# Patient Record
Sex: MALE | Race: WHITE | NOT HISPANIC OR LATINO | Employment: OTHER | URBAN - METROPOLITAN AREA
[De-identification: names, ages, dates, MRNs, and addresses within clinical notes are randomized per-mention and may not be internally consistent; named-entity substitution may affect disease eponyms.]

---

## 2017-03-09 ENCOUNTER — HOSPITAL ENCOUNTER (OUTPATIENT)
Dept: NEUROLOGY | Facility: CLINIC | Age: 32
Setting detail: THERAPIES SERIES
Discharge: STILL ADMITTED OR ADMINISTRATIVE | End: 2017-03-09
Attending: NURSE PRACTITIONER

## 2017-03-09 DIAGNOSIS — S06.9XAA TBI (TRAUMATIC BRAIN INJURY) (H): ICD-10-CM

## 2017-03-09 DIAGNOSIS — F90.9 ADHD (ATTENTION DEFICIT HYPERACTIVITY DISORDER): ICD-10-CM

## 2017-03-09 DIAGNOSIS — F06.30 MOOD DISORDER AS LATE EFFECT OF TRAUMATIC BRAIN INJURY (H): ICD-10-CM

## 2017-03-09 DIAGNOSIS — S06.9XAS MOOD DISORDER AS LATE EFFECT OF TRAUMATIC BRAIN INJURY (H): ICD-10-CM

## 2017-09-12 ENCOUNTER — HOSPITAL ENCOUNTER (OUTPATIENT)
Dept: NEUROLOGY | Facility: CLINIC | Age: 32
Setting detail: THERAPIES SERIES
Discharge: STILL ADMITTED OR ADMINISTRATIVE | End: 2017-09-12
Attending: NURSE PRACTITIONER

## 2017-09-12 DIAGNOSIS — F06.30 MOOD DISORDER AS LATE EFFECT OF TRAUMATIC BRAIN INJURY (H): ICD-10-CM

## 2017-09-12 DIAGNOSIS — S06.9XAS MOOD DISORDER AS LATE EFFECT OF TRAUMATIC BRAIN INJURY (H): ICD-10-CM

## 2017-09-12 DIAGNOSIS — S06.9XAA TBI (TRAUMATIC BRAIN INJURY) (H): ICD-10-CM

## 2017-09-12 DIAGNOSIS — F90.9 ADHD (ATTENTION DEFICIT HYPERACTIVITY DISORDER): ICD-10-CM

## 2018-01-31 ENCOUNTER — APPOINTMENT (OUTPATIENT)
Dept: GENERAL RADIOLOGY | Facility: CLINIC | Age: 33
End: 2018-01-31
Attending: EMERGENCY MEDICINE
Payer: MEDICARE

## 2018-01-31 ENCOUNTER — HOSPITAL ENCOUNTER (EMERGENCY)
Facility: CLINIC | Age: 33
Discharge: HOME | End: 2018-01-31
Attending: EMERGENCY MEDICINE | Admitting: EMERGENCY MEDICINE
Payer: MEDICARE

## 2018-01-31 VITALS
HEIGHT: 67 IN | BODY MASS INDEX: 23.54 KG/M2 | TEMPERATURE: 98.3 F | SYSTOLIC BLOOD PRESSURE: 115 MMHG | DIASTOLIC BLOOD PRESSURE: 81 MMHG | RESPIRATION RATE: 16 BRPM | WEIGHT: 150 LBS | HEART RATE: 89 BPM | OXYGEN SATURATION: 91 %

## 2018-01-31 DIAGNOSIS — T17.308A CHOKING, INITIAL ENCOUNTER: ICD-10-CM

## 2018-01-31 PROCEDURE — 71046 X-RAY EXAM CHEST 2 VIEWS: CPT

## 2018-01-31 PROCEDURE — 99283 EMERGENCY DEPT VISIT LOW MDM: CPT | Mod: 25

## 2018-01-31 ASSESSMENT — ENCOUNTER SYMPTOMS
FEVER: 0
SHORTNESS OF BREATH: 0
CHOKING: 1
COUGH: 0

## 2018-01-31 NOTE — ED NOTES
Bed: ED07  Expected date:   Expected time:   Means of arrival:   Comments:  ginny - 541 - 32M winnie eta [id number]

## 2018-01-31 NOTE — ED AVS SNAPSHOT
Emergency Department    [address] [place] [address]    Phone:  [phone]    Fax:  [phone]                                       Robin Blum   MRN: [id number]    Department:   Emergency Department   Date of Visit:  1/31/2018           Patient Information     Date Of Birth          1985        Your diagnoses for this visit were:     Choking, initial encounter        You were seen by Jeanine Mantilla MD.      Follow-up Information     Please follow up.    Why:  Follow up with your MD in 2 days. Return if difficulty breathing, fever >101, productive cough      Discharge References/Attachments     CHOKING SPELL (ADULT) (ENGLISH)      24 Hour Appointment Hotline       To make an appointment at any [facility], call [phone] ([phone]). If you don't have a family doctor or clinic, we will help you find one. [facility] clinics are conveniently located to serve the needs of you and your family.             Review of your medicines      Our records show that you are taking the medicines listed below. If these are incorrect, please call your family doctor or clinic.        Dose / Directions Last dose taken    acetaminophen 500 MG Caps   Dose:  1 tablet   Quantity:  100 capsule        Take 1 tablet by mouth every 4 hours as needed.   Refills:  0        alum & mag hydroxide-simethicone 400-400-40 MG/5ML Susp suspension   Commonly known as:  MYLANTA ES/MAALOX  ES   Dose:  10-20 mL        Take 10-20 mLs by mouth every 4 hours as needed for indigestion   Refills:  0        ascorbic acid 250 MG Chew chewable tablet   Commonly known as:  vitamin C   Dose:  250 mg   Quantity:  30 tablet        Take 1 tablet by mouth daily.   Refills:  0        calcium polycarbophil 625 MG tablet   Commonly known as:  FIBERCON   Dose:  1 tablet        Take 1 tablet by mouth daily (with lunch)   Refills:  0        cefuroxime 500 MG tablet   Commonly known as:  CEFTIN   Dose:  500 mg    Quantity:  20 tablet        Take 1 tablet (500 mg) by mouth 2 times daily   Refills:  0        clotrimazole 1 % cream   Commonly known as:  LOTRIMIN        Apply topically as needed   Refills:  0        DAILY EARL Tabs   Dose:  1 tablet   Quantity:  30 tablet        Take 1 tablet by mouth daily.   Refills:  5        DOCUSATE SODIUM PO   Dose:  200 mg        Take 200 mg by mouth At Bedtime   Refills:  0        FIBER PO   Dose:  1 tablet        Take 1 tablet by mouth daily   Refills:  0        FLEET ENEMA RE        Place rectally as needed   Refills:  0        FLEET LIQUID GLYCERIN SUPP RE   Dose:  2 g        Place 2 g rectally as needed   Refills:  0        fluticasone 27.5 MCG/SPRAY spray   Commonly known as:  VERAMYST   Dose:  2 spray        Spray 2 sprays into both nostrils daily   Refills:  0        hydrocortisone 1 % cream   Commonly known as:  CORTAID        Apply topically as needed   Refills:  0        IBUPROFEN PO   Dose:  200-400 mg        Take 200-400 mg by mouth every 4 hours as needed for moderate pain   Refills:  0        KLONOPIN PO   Dose:  1 mg   Indication:  Muscular Spasm or Twitch occurring with Seizures        Take 1 mg by mouth 4 times daily 08/12/16/22   Refills:  0        lamoTRIgine 100 MG Tbdp ODT tab   Commonly known as:  LAMICTAL ODT   Dose:  100 mg   Quantity:  30 tablet        Take 1 tablet (100 mg) by mouth daily   Refills:  0        loperamide 2 MG tablet   Commonly known as:  IMODIUM A-D   Dose:  2 mg        Take 2 mg by mouth as needed for diarrhea   Refills:  0        LORazepam 2 MG/ML (HIGH CONC) solution   Commonly known as:  ATIVAN   Dose:  2 mg   Indication:  Status Epilepticus        Take 2 mg by mouth daily as needed for anxiety   Refills:  0        LOTRIMIN AF 2 % Aero   Generic drug:  Miconazole Nitrate        Externally apply topically as needed   Refills:  0        MILK OF MAGNESIA PO        Take by mouth as needed   Refills:  0        omeprazole 20 MG tablet   Dose:  20  mg   Quantity:  30 tablet        Take 20 mg by mouth daily.   Refills:  0        polyethylene glycol Packet   Commonly known as:  MIRALAX/GLYCOLAX   Dose:  1.5 packet        Take 1.5 packets by mouth daily   Refills:  0        PRISTIQ PO   Dose:  100 mg        Take 100 mg by mouth daily (with lunch) 1200 noon   Refills:  0        * QUEtiapine 25 MG tablet   Commonly known as:  SEROquel   Dose:  25 mg        Take 25 mg by mouth 3 times daily as needed   Refills:  0        * QUEtiapine 50 MG tablet   Commonly known as:  SEROquel   Dose:  50 mg        Take 50 mg by mouth At Bedtime   Refills:  0        * QUEtiapine 50 MG tablet   Commonly known as:  SEROQUEL   Dose:  50 mg   Quantity:  30 tablet        Take 1 tablet by mouth. 1 tablet po daily   Refills:  0        ROBITUSSIN MUCUS+CHEST CONGEST 100 MG/5ML Liqd   Dose:  10 mL   Generic drug:  guaiFENesin        Take 10 mLs by mouth every 4 hours as needed for cough   Refills:  0        SELSUN BLUE SALON 1 % Sham   Generic drug:  pyrithione zinc        Apply topically daily as needed for irritation   Refills:  0        senna-docusate 8.6-50 MG per tablet   Commonly known as:  SENNA PLUS   Dose:  1 tablet   Quantity:  60 tablet        Take 1 tablet by mouth 2 times daily.   Refills:  0        sodium chloride 0.65 % nasal spray   Commonly known as:  OCEAN   Dose:  1 spray        Spray 1 spray into both nostrils as needed for congestion   Refills:  0        SPORT SUNSCREEN SPF15 EX        Externally apply topically as needed   Refills:  0        SUDAFED PE PRESSURE+PAIN+COLD PO        Take by mouth as needed   Refills:  0        tiZANidine 2 MG tablet   Commonly known as:  ZANAFLEX   Dose:  4 mg   Quantity:  120 tablet        Take 2 tablets by mouth. Twice daily   Refills:  0        VITAMIN D3 PO   Dose:  400 Units        Take 400 Units by mouth daily (with lunch)   Refills:  0        * Notice:  This list has 3 medication(s) that are the same as other medications prescribed  for you. Read the directions carefully, and ask your doctor or other care provider to review them with you.            Procedures and tests performed during your visit     XR Chest 2 Views      Orders Needing Specimen Collection     None      Pending Results     No orders found from 1/29/2018 to 2/1/2018.            Pending Culture Results     No orders found from 1/29/2018 to 2/1/2018.            Pending Results Instructions     If you had any lab results that were not finalized at the time of your Discharge, you can call the ED Lab Result RN at [phone]. You will be contacted by this team for any positive Lab results or changes in treatment. The nurses are available 7 days a week from 10A to 6:30P.  You can leave a message 24 hours per day and they will return your call.        Test Results From Your Hospital Stay        1/31/2018  4:41 PM      Narrative     CHEST TWO VIEWS   1/31/2018 4:33 PM     HISTORY: Choking. Possible aspiration.    COMPARISON: None.    FINDINGS: Hypoinflated lungs. No convincing pulmonary opacities.  Normal size cardiac silhouette. A ventriculoperitoneal shunt catheter  projects over the right hemithorax.        Impression     IMPRESSION: No convincing evidence of active cardiopulmonary disease.    DWIGHT COY MD                Clinical Quality Measure: Blood Pressure Screening     Your blood pressure was checked while you were in the emergency department today. The last reading we obtained was  BP: 108/74 . Please read the guidelines below about what these numbers mean and what you should do about them.  If your systolic blood pressure (the top number) is less than 120 and your diastolic blood pressure (the bottom number) is less than 80, then your blood pressure is normal. There is nothing more that you need to do about it.  If your systolic blood pressure (the top number) is 120-139 or your diastolic blood pressure (the bottom number) is 80-89, your blood pressure may be higher  "than it should be. You should have your blood pressure rechecked within a year by a primary care provider.  If your systolic blood pressure (the top number) is 140 or greater or your diastolic blood pressure (the bottom number) is 90 or greater, you may have high blood pressure. High blood pressure is treatable, but if left untreated over time it can put you at risk for heart attack, stroke, or kidney failure. You should have your blood pressure rechecked by a primary care provider within the next 4 weeks.  If your provider in the emergency department today gave you specific instructions to follow-up with your doctor or provider even sooner than that, you should follow that instruction and not wait for up to 4 weeks for your follow-up visit.        Thank you for choosing [facility]       Thank you for choosing [facility] for your care. Our goal is always to provide you with excellent care. Hearing back from our patients is one way we can continue to improve our services. Please take a few minutes to complete the written survey that you may receive in the mail after you visit with us. Thank you!        [employer] Information     [employer] lets you send messages to your doctor, view your test results, renew your prescriptions, schedule appointments and more. To sign up, go to [email or web address] . Click on \"Log in\" on the left side of the screen, which will take you to the Welcome page. Then click on \"Sign up Now\" on the right side of the page.     You will be asked to enter the access code listed below, as well as some personal information. Please follow the directions to create your username and password.     Your access code is: [email or web address]  Expires: 2018  6:44 PM     Your access code will  in 90 days. If you need help or a new code, please call your [facility] clinic or [phone].        Care EveryWhere ID     This is your Care EveryWhere ID. This could be used by other organizations to access your [facility] " medical records  [id number]        Equal Access to Services     CARLOS BERNAL : Thaddeus Prince, archana collins, david encinas. So [facility] [phone].    ATENCIÓN: Si habla español, tiene a hairston disposición servicios gratuitos de asistencia lingüística. Llame al [phone].    We comply with applicable federal civil rights laws and [place] laws. We do not discriminate on the basis of race, color, national origin, age, disability, sex, sexual orientation, or gender identity.            After Visit Summary       This is your record. Keep this with you and show to your community pharmacist(s) and doctor(s) at your next visit.

## 2018-01-31 NOTE — ED AVS SNAPSHOT
Emergency Department    [address] [place] [address]    Phone:  [phone]    Fax:  [phone]                                       Robin Blum   MRN: [id number]    Department:   Emergency Department   Date of Visit:  1/31/2018           After Visit Summary Signature Page     I have received my discharge instructions, and my questions have been answered. I have discussed any challenges I see with this plan with the nurse or doctor.    ..........................................................................................................................................  Patient/Patient Representative Signature      ..........................................................................................................................................  Patient Representative Print Name and Relationship to Patient    ..................................................               ................................................  Date                                            Time    ..........................................................................................................................................  Reviewed by Signature/Title    ...................................................              ..............................................  Date                                                            Time

## 2018-01-31 NOTE — ED PROVIDER NOTES
History     Chief Complaint:  Choking    HPI   History taken by family and staff from group [place] due to the patient's nonverbal status.  Robin Blum is a 32 year old male with a history of paraplegia, TBI, non-verbal and choking on a specific protocol, who presents with concerns for choking. The patient had a TBI about 10 years ago and has since had diet restrictions for choking concerns. He has a history of frequent choking episodes with an episode last year at the [facility] when the patient choked on chicken and EMS was contacted. He currently lives in a group home and today, while eating a burger and fries, suffered another choking episode and was unconscious for 5 minutes. Heimlich was performed by a  and food was successfully dislodged and chest compressions followed. He vomited and regained concsiouness. He has not been coughing or having any increased secretions. The patient's parents are at bedside and believe that his choking is a result of high turnover at his care facility and they are concerned that the staff are not up to date with his ongoing plan. They also note that the patient passed a swallowing study and his choking happens when he eats fast. He denies fevers or recent illness. His primary care provider is Dr. Oneill at [facility] in [place]. He has a  shunt in place.    Allergies:  Aloe  Depakote [Valproic Acid]  Vicodin [Hydrocodone-Acetaminophen]    Medications:    fluticasone (VERAMYST) 27.5 MCG/SPRAY spray  QUEtiapine (SEROQUEL) 25 MG tablet  QUEtiapine (SEROQUEL) 50 MG tablet  sodium chloride (OCEAN) 0.65 % nasal spray  lamoTRIgine (LAMICTAL ODT) 100 MG TBDP  ClonazePAM (KLONOPIN PO)  DOCUSATE SODIUM PO  Cholecalciferol (VITAMIN D3 PO)  LORazepam (ATIVAN) 2 MG/ML concentrated solution  Multiple Vitamin (DAILY EARL) TABS  Omeprazole 20 MG tablet  TIZANidine (ZANAFLEX) 2 MG tablet  hydrocortisone (CORTAID) 1 % cream  loperamide (IMODIUM A-D) 2 MG  "tablet  cefUROXime (CEFTIN) 500 MG tablet  clotrimazole (LOTRIMIN) 1 % cream  FIBER PO  Sodium Phosphates (FLEET ENEMA RE)  Glycerin, Laxative, (FLEET LIQUID GLYCERIN SUPP RE)  Miconazole Nitrate (LOTRIMIN AF) 2 % AERO  Magnesium Hydroxide (MILK OF MAGNESIA PO)  guaiFENesin (ROBITUSSIN MUCUS+CHEST CONGEST) 100 MG/5ML LIQD  pyrithione zinc (SELSUN BLUE SALON) 1 % SHAM  Phenylephrine-DM-GG-APAP (SUDAFED PE PRESSURE+PAIN+COLD PO)  SPORT SUNSCREEN SPF15 EX  Desvenlafaxine Succinate (PRISTIQ PO)  calcium polycarbophil (FIBERCON) 625 MG tablet  polyethylene glycol (MIRALAX/GLYCOLAX) packet  IBUPROFEN PO  alum & mag hydroxide-simethicone (MYLANTA ES/MAALOX  ES) 400-400-40 MG/5ML SUSP  senna-docusate (SENNA PLUS) 8.6-50 MG per tablet  ascorbic acid (VITAMIN C) 250 MG CHEW  Acetaminophen 500 MG CAPS    Past Medical History:    Aphasia  Attention deficit disorder with hyperactivity  Depressive disorder   Esophageal reflux  Myoclonus  OCD  Other mixed or unspecified nondependent drug abuse in remission  Paraplegia  Personal history of TBI  Unspecified constipation    Past Surgical History:    History reviewed. No pertinent past surgical history.    Family History:    CAD  Hyperlipidemia  Depression/Anxiety    Social History:  The patient was accompanied to the ED by his parents and a group home staff member.  Smoking Status: never  Smokeless Tobacco: never  Alcohol Use: no    Marital Status:  Single [1]    Review of Systems   Constitutional: Negative for fever.   Respiratory: Positive for choking. Negative for cough and shortness of breath.    All other systems reviewed and are negative.      Physical Exam   First Vitals:  BP: 114/76  Pulse: 110  Temp: 98.3  F (36.8  C)  Resp: 20  Height: 170.2 cm (5' 7\")  Weight: 68 kg (150 lb)  SpO2: 91 %  Patient Vitals for the past 24 hrs:   BP Temp Temp src Pulse Resp SpO2 Height Weight   01/31/18 1830 115/81 - - 89 16 91 % - -   01/31/18 1800 114/82 - - 85 18 92 % - -   01/31/18 1730 " "108/74 - - 81 18 96 % - -   01/31/18 1700 109/77 - - 85 18 97 % - -   01/31/18 1600 107/78 - - 95 18 95 % - -   01/31/18 1530 109/76 - - 100 20 93 % - -   01/31/18 1528 107/79 - - 102 18 91 % - -   01/31/18 1519 114/76 98.3  F (36.8  C) Oral 110 20 91 % 1.702 m (5' 7\") 68 kg (150 lb)         Physical Exam  Physical Exam   Constitutional:  Non verbal, very pleasant male, no acute distress.   HENT:   Mouth/Throat:   Oropharynx is clear and moist.   Eyes:    Conjunctivae normal and EOM are normal. Pupils are equal, round, and reactive to light.   Neck:    Normal range of motion.   Cardiovascular: Normal rate, regular rhythm and normal heart sounds.  Exam reveals no gallop and no friction rub.  No murmur heard.  Pulmonary/Chest:  Effort normal and breath sounds are equal bilaterally. Patient has no wheezes. Patient has no rales or rhonchi.   Abdominal:   Soft. Bowel sounds are normal. Patient exhibits no mass. There is no tenderness. There is no rebound and no guarding.   Musculoskeletal:  Normal range of motion. Patient exhibits no edema.   Neurological:   Alert, nonverbal, no cranial nerve deficits, no acute motor sensory deficits.  Skin:   Skin is warm and dry. No rash noted. No erythema.   Psychiatric:   Patient has a pleasant mood.      Emergency Department Course   Imaging:  Radiographic findings were communicated with the patient who voiced understanding of the findings.  XR Chest 2 views:   No convincing evidence of active cardiopulmonary disease. As per radiology.     Emergency Department Course:  Nursing notes and vitals reviewed. I performed an exam of the patient as documented above.     The patient was sent for a chest X-ray while in the emergency department, findings above.     1810 I rechecked the patient and discussed the results of his workup thus far.     Findings and plan explained to the Patient and family at bedside. Patient discharged home with instructions regarding supportive care, medications, " and reasons to return. The importance of close follow-up was reviewed.     I personally reviewed the laboratory results with the Patient and family and answered all related questions prior to discharge.     Impression & Plan    Medical Decision Making:  Robin Blum is a 31 y/o gentlemen who was brought in for evaluation after a witnessed choking episode at his group home. Staff did heimlich and was able to dislodge the piece of hamburger that was in his throat. They transported him here for evaluation. When I assessed him he was in no respiratory distress and was at his baseline. He had no sore throat or cough. X-ray was performed to evaluate for aspiration pneumonia, fortunately this looks normal. He was observed here in the ED for some time on the pulse oximeter and his stats and vital signs remained normal. At this time, while he had an acute choking episode, with it sounds like a period of respiratory arrest before heimlich was performed, he is breathing normally now and I feel he is safe to be discharged back to his care facility. I explained to his family that I would like him reassess with the house physician in the next 1-2 days as pneumonia can sometimes develop a day or 2 after the actual choking episode. They should also abide by the dietary restrictions that he is on.     Diagnosis:    ICD-10-CM    1. Choking, initial encounter T17.308A        Disposition:  discharged to home    I, Hudson Stubbs, am serving as a scribe on 1/31/2018 at 4:01 PM to personally document services performed by Jeanine [facility] MD based on my observations and the provider's statements to me.       Hudson Stubbs  1/31/2018    EMERGENCY DEPARTMENT       Jeanine [facility] MD  02/01/18 0049

## 2018-03-13 ENCOUNTER — HOSPITAL ENCOUNTER (OUTPATIENT)
Dept: NEUROLOGY | Facility: CLINIC | Age: 33
Setting detail: THERAPIES SERIES
Discharge: STILL ADMITTED OR ADMINISTRATIVE | End: 2018-03-13
Attending: NURSE PRACTITIONER

## 2018-03-13 DIAGNOSIS — F90.9 ADHD (ATTENTION DEFICIT HYPERACTIVITY DISORDER): ICD-10-CM

## 2018-03-13 DIAGNOSIS — F06.30 MOOD DISORDER AS LATE EFFECT OF TRAUMATIC BRAIN INJURY (H): ICD-10-CM

## 2018-03-13 DIAGNOSIS — S06.9XAS MOOD DISORDER AS LATE EFFECT OF TRAUMATIC BRAIN INJURY (H): ICD-10-CM

## 2018-03-19 ENCOUNTER — COMMUNICATION - HEALTHEAST (OUTPATIENT)
Dept: NEUROLOGY | Facility: CLINIC | Age: 33
End: 2018-03-19

## 2018-03-27 ENCOUNTER — COMMUNICATION - HEALTHEAST (OUTPATIENT)
Dept: NEUROLOGY | Facility: CLINIC | Age: 33
End: 2018-03-27

## 2018-03-27 ENCOUNTER — AMBULATORY - HEALTHEAST (OUTPATIENT)
Dept: NEUROLOGY | Facility: CLINIC | Age: 33
End: 2018-03-27

## 2018-03-27 DIAGNOSIS — F32.A DEPRESSION: ICD-10-CM

## 2018-05-24 ENCOUNTER — HOSPITAL ENCOUNTER (EMERGENCY)
Facility: CLINIC | Age: 33
Discharge: HOME | End: 2018-05-24
Attending: EMERGENCY MEDICINE | Admitting: EMERGENCY MEDICINE
Payer: MEDICARE

## 2018-05-24 VITALS
WEIGHT: 185 LBS | HEART RATE: 121 BPM | DIASTOLIC BLOOD PRESSURE: 80 MMHG | HEIGHT: 67 IN | TEMPERATURE: 99.2 F | SYSTOLIC BLOOD PRESSURE: 112 MMHG | OXYGEN SATURATION: 91 % | BODY MASS INDEX: 29.03 KG/M2 | RESPIRATION RATE: 16 BRPM

## 2018-05-24 DIAGNOSIS — R45.1 AGITATION: ICD-10-CM

## 2018-05-24 PROCEDURE — 99282 EMERGENCY DEPT VISIT SF MDM: CPT

## 2018-05-24 ASSESSMENT — ENCOUNTER SYMPTOMS: AGITATION: 1

## 2018-05-24 NOTE — ED AVS SNAPSHOT
Emergency Department    [address] [facility] [address]    Phone:  [phone]    Fax:  [phone]                                       Robin Blum   MRN: [id number]    Department:   Emergency Department   Date of Visit:  5/24/2018           Patient Information     Date Of Birth          1985        Your diagnoses for this visit were:     Agitation        You were seen by Glenny Briseno MD.      Follow-up Information     Schedule an appointment as soon as possible for a visit with Renny Oneill.    Specialty:  Family Practice    Why:  As needed    Contact information:    [facility]  [address] [facility] [address] [phone]          Follow up with  Emergency Department.    Specialty:  EMERGENCY MEDICINE    Why:  If symptoms worsen    Contact information:    [address] [facility] [address] [phone]        Discharge Instructions       Continue current cares and deescalation techniques.  Avoid head injury.      24 Hour Appointment Hotline       To make an appointment at any [facility], call [phone] ([phone]). If you don't have a family doctor or clinic, we will help you find one. [place] clinics are conveniently located to serve the needs of you and your family.             Review of your medicines      Our records show that you are taking the medicines listed below. If these are incorrect, please call your family doctor or clinic.        Dose / Directions Last dose taken    acetaminophen 500 MG Caps   Dose:  1 tablet   Quantity:  100 capsule        Take 1 tablet by mouth every 4 hours as needed.   Refills:  0        alum & mag hydroxide-simethicone 400-400-40 MG/5ML Susp suspension   Commonly known as:  MYLANTA ES/MAALOX  ES   Dose:  10-20 mL        Take 10-20 mLs by mouth every 4 hours as needed for indigestion   Refills:  0        ascorbic acid 250 MG Chew chewable tablet   Commonly known as:  vitamin C   Dose:   250 mg   Quantity:  30 tablet        Take 1 tablet by mouth daily.   Refills:  0        calcium polycarbophil 625 MG tablet   Commonly known as:  FIBERCON   Dose:  1 tablet        Take 1 tablet by mouth daily (with lunch)   Refills:  0        cefuroxime 500 MG tablet   Commonly known as:  CEFTIN   Dose:  500 mg   Quantity:  20 tablet        Take 1 tablet (500 mg) by mouth 2 times daily   Refills:  0        clotrimazole 1 % cream   Commonly known as:  LOTRIMIN        Apply topically as needed   Refills:  0        DAILY EARL Tabs   Dose:  1 tablet   Quantity:  30 tablet        Take 1 tablet by mouth daily.   Refills:  5        DOCUSATE SODIUM PO   Dose:  200 mg        Take 200 mg by mouth At Bedtime   Refills:  0        FIBER PO   Dose:  1 tablet        Take 1 tablet by mouth daily   Refills:  0        FLEET ENEMA RE        Place rectally as needed   Refills:  0        FLEET LIQUID GLYCERIN SUPP RE   Dose:  2 g        Place 2 g rectally as needed   Refills:  0        fluticasone 27.5 MCG/SPRAY spray   Commonly known as:  VERAMYST   Dose:  2 spray        Spray 2 sprays into both nostrils daily   Refills:  0        hydrocortisone 1 % cream   Commonly known as:  CORTAID        Apply topically as needed   Refills:  0        IBUPROFEN PO   Dose:  200-400 mg        Take 200-400 mg by mouth every 4 hours as needed for moderate pain   Refills:  0        KLONOPIN PO   Dose:  1 mg   Indication:  Muscular Spasm or Twitch occurring with Seizures        Take 1 mg by mouth 4 times daily 08/12/16/22   Refills:  0        lamoTRIgine 100 MG Tbdp ODT tab   Commonly known as:  LAMICTAL ODT   Dose:  100 mg   Quantity:  30 tablet        Take 1 tablet (100 mg) by mouth daily   Refills:  0        loperamide 2 MG tablet   Commonly known as:  IMODIUM A-D   Dose:  2 mg        Take 2 mg by mouth as needed for diarrhea   Refills:  0        LORazepam 2 MG/ML (HIGH CONC) solution   Commonly known as:  ATIVAN   Dose:  2 mg   Indication:  Status  Epilepticus        Take 2 mg by mouth daily as needed for anxiety   Refills:  0        LOTRIMIN AF 2 % Aero   Generic drug:  Miconazole Nitrate        Externally apply topically as needed   Refills:  0        MILK OF MAGNESIA PO        Take by mouth as needed   Refills:  0        omeprazole 20 MG tablet   Dose:  20 mg   Quantity:  30 tablet        Take 20 mg by mouth daily.   Refills:  0        polyethylene glycol Packet   Commonly known as:  MIRALAX/GLYCOLAX   Dose:  1.5 packet        Take 1.5 packets by mouth daily   Refills:  0        PRISTIQ PO   Dose:  100 mg        Take 100 mg by mouth daily (with lunch) 1200 noon   Refills:  0        * QUEtiapine 25 MG tablet   Commonly known as:  SEROquel   Dose:  25 mg        Take 25 mg by mouth 3 times daily as needed   Refills:  0        * QUEtiapine 50 MG tablet   Commonly known as:  SEROquel   Dose:  50 mg        Take 50 mg by mouth At Bedtime   Refills:  0        * QUEtiapine 50 MG tablet   Commonly known as:  SEROQUEL   Dose:  50 mg   Quantity:  30 tablet        Take 1 tablet by mouth. 1 tablet po daily   Refills:  0        ROBITUSSIN MUCUS+CHEST CONGEST 100 MG/5ML Liqd   Dose:  10 mL   Generic drug:  guaiFENesin        Take 10 mLs by mouth every 4 hours as needed for cough   Refills:  0        SELSUN BLUE SALON 1 % Sham   Generic drug:  pyrithione zinc        Apply topically daily as needed for irritation   Refills:  0        senna-docusate 8.6-50 MG per tablet   Commonly known as:  SENNA PLUS   Dose:  1 tablet   Quantity:  60 tablet        Take 1 tablet by mouth 2 times daily.   Refills:  0        sodium chloride 0.65 % nasal spray   Commonly known as:  OCEAN   Dose:  1 spray        Spray 1 spray into both nostrils as needed for congestion   Refills:  0        SPORT SUNSCREEN SPF15 EX        Externally apply topically as needed   Refills:  0        SUDAFED PE PRESSURE+PAIN+COLD PO        Take by mouth as needed   Refills:  0        tiZANidine 2 MG tablet   Commonly  known as:  ZANAFLEX   Dose:  4 mg   Quantity:  120 tablet        Take 2 tablets by mouth. Twice daily   Refills:  0        VITAMIN D3 PO   Dose:  400 Units        Take 400 Units by mouth daily (with lunch)   Refills:  0        * Notice:  This list has 3 medication(s) that are the same as other medications prescribed for you. Read the directions carefully, and ask your doctor or other care provider to review them with you.            Orders Needing Specimen Collection     None      Pending Results     No orders found from 5/22/2018 to 5/25/2018.            Pending Culture Results     No orders found from 5/22/2018 to 5/25/2018.            Pending Results Instructions     If you had any lab results that were not finalized at the time of your Discharge, you can call the ED Lab Result RN at [phone]. You will be contacted by this team for any positive Lab results or changes in treatment. The nurses are available 7 days a week from 10A to 6:30P.  You can leave a message 24 hours per day and they will return your call.        Test Results From Your Hospital Stay               Clinical Quality Measure: Blood Pressure Screening     Your blood pressure was checked while you were in the emergency department today. The last reading we obtained was  BP: 112/80 . Please read the guidelines below about what these numbers mean and what you should do about them.  If your systolic blood pressure (the top number) is less than 120 and your diastolic blood pressure (the bottom number) is less than 80, then your blood pressure is normal. There is nothing more that you need to do about it.  If your systolic blood pressure (the top number) is 120-139 or your diastolic blood pressure (the bottom number) is 80-89, your blood pressure may be higher than it should be. You should have your blood pressure rechecked within a year by a primary care provider.  If your systolic blood pressure (the top number) is 140 or greater or your diastolic  "blood pressure (the bottom number) is 90 or greater, you may have high blood pressure. High blood pressure is treatable, but if left untreated over time it can put you at risk for heart attack, stroke, or kidney failure. You should have your blood pressure rechecked by a primary care provider within the next 4 weeks.  If your provider in the emergency department today gave you specific instructions to follow-up with your doctor or provider even sooner than that, you should follow that instruction and not wait for up to 4 weeks for your follow-up visit.        Thank you for choosing [facility]       Thank you for choosing [facility] for your care. Our goal is always to provide you with excellent care. Hearing back from our patients is one way we can continue to improve our services. Please take a few minutes to complete the written survey that you may receive in the mail after you visit with us. Thank you!        [employer] Information     [employer] lets you send messages to your doctor, view your test results, renew your prescriptions, schedule appointments and more. To sign up, go to [email or web address] . Click on \"Log in\" on the left side of the screen, which will take you to the Welcome page. Then click on \"Sign up Now\" on the right side of the page.     You will be asked to enter the access code listed below, as well as some personal information. Please follow the directions to create your username and password.     Your access code is: [email or web address]  Expires: 2018  4:42 PM     Your access code will  in 90 days. If you need help or a new code, please call your [facility] clinic or [phone].        Care EveryWhere ID     This is your Care EveryWhere ID. This could be used by other organizations to access your [facility] medical records  [id number]        Equal Access to Services     CARLOS BERNAL AH: Thaddeus Prince, acrhana collins, david encinas " la'hank ar. So [facility] [phone].    ATENCIÓN: Si habla español, tiene a hairston disposición servicios gratuitos de asistencia lingüística. Llame al [phone].    We comply with applicable federal civil rights laws and [place] laws. We do not discriminate on the basis of race, color, national origin, age, disability, sex, sexual orientation, or gender identity.            After Visit Summary       This is your record. Keep this with you and show to your community pharmacist(s) and doctor(s) at your next visit.

## 2018-05-24 NOTE — PROGRESS NOTES
Spoke with , Miriam ([phone]). They stated he was sent in bc he has brain injuries and was banging his head. They are just seeking medical clearance. Eugenia and Vitor are parents and can give consent if treatment is needed. Staff can get pt's wheelchair and drive him home in their van. Advised ED MD.

## 2018-05-24 NOTE — ED NOTES
Bed: ED18  Expected date:   Expected time:   Means of arrival:   Comments:  ginny - 514 - 33M psych eval eta 5701

## 2018-05-24 NOTE — ED PROVIDER NOTES
History     Chief Complaint:  Psychiatric Evaluation    The history is provided by a caregiver. The history is limited by the condition of the patient.      Robin Blum is a 33 year old male who presents with psychiatric evaluation.  Patient is nonverbal and HPI provided by caregiver.  The patient was at his group home today and was not with his normal care provider, and therefore became agitated.  Additionally, the patient began throwing himself onto the floor from his wheelchair and hit his head on the floor.  Therefore, EMS was called to the group home for further support.  Here in the ED, the patient denies any pain.    Allergies:  Aloe  Depakote [Valproic Acid]  Vicodin [Hydrocodone-Acetaminophen]    Medications:    Acetaminophen  alum & mag hydroxide-simethicone (MYLANTA ES/MAALOX  ES)   ascorbic acid (VITAMIN C)   calcium polycarbophil (FIBERCON)   cefUROXime (CEFTIN)  Cholecalciferol (VITAMIN D3 PO)  ClonazePAM (KLONOPIN PO)  clotrimazole (LOTRIMIN)  Desvenlafaxine Succinate (PRISTIQ PO)  DOCUSATE SODIUM  FIBER   fluticasone (VERAMYST)   Glycerin, Laxative, (FLEET LIQUID GLYCERIN SUPP RE)  guaiFENesin (ROBITUSSIN MUCUS+CHEST CONGEST)  hydrocortisone (CORTAID)  IBUPROFEN   lamoTRIgine (LAMICTAL ODT)   loperamide (IMODIUM A-D)   LORazepam (ATIVAN)   Magnesium Hydroxide (MILK OF MAGNESIA PO)  Miconazole Nitrate (LOTRIMIN AF)  Omeprazole  Phenylephrine-DM-GG-APAP (SUDAFED PE PRESSURE+PAIN+COLD PO)  polyethylene glycol (MIRALAX/GLYCOLAX)   pyrithione zinc (SELSUN BLUE SALON)   QUEtiapine (SEROQUEL)  senna-docusate (SENNA PLUS)   sodium chloride (OCEAN)   Sodium Phosphates (FLEET ENEMA RE)    TIZANidine (ZANAFLEX)     Past Medical History:    Aphasia   Attention deficit disorder with hyperactivity(314.01)   Depressive disorder, not elsewhere classified   Esophageal reflux   Myoclonus   OCD (obsessive compulsive disorder)   Other, mixed, or unspecified nondependent drug abuse, in remission   Paraplegia  "(H)   Personal history of traumatic brain injury   Unspecified constipation   TBI    Past Surgical History:    History reviewed. No pertinent surgical history.    Family History:    CAD  Hyperlipidemia     Social History:  Smoking Status: Never Smoker  Alcohol Use: No  Patient presents with caregiver.  Marital Status:  Single [1]    Review of Systems   Psychiatric/Behavioral: Positive for agitation.   10 point review of systems performed and is negative except as above and in HPI.    Physical Exam     Patient Vitals for the past 24 hrs:   BP Temp Temp src Pulse Heart Rate Resp SpO2 Height Weight   05/24/18 1449 112/80 99.2  F (37.3  C) Oral 121 121 16 91 % 1.702 m (5' 7\") 83.9 kg (185 lb)     Physical Exam  General: Resting on the gurney, baseline nonverbal but does communicate with hand gestures and head nodding. appears comfortable and cheerful, showing a thumbs up and blowing kisses.  Head:  The scalp, face, and head appear normal  Mouth/Throat: Mucus membranes are moist  CV:  Regular rate    Normal S1 and S2  No pathological murmur   Resp:  Breath sounds clear and equal bilaterally    Non-labored, no retractions or accessory muscle use    No coarseness    No wheezing   GI:  Abdomen is soft, no rigidity    No tenderness to palpation  MS:  Normal motor assessment of all extremities.    Good capillary refill noted.    No evidence of injury.    Skin:   No rash or lesions noted.  Neuro:   Speech is normal and fluent. No apparent deficit.  Psych: Awake. Alert.  Appropriate attitude.  Not aggressive in room.        Emergency Department Course     Emergency Department Course:  Nursing notes and vitals reviewed. 1631 I performed an exam of the patient as documented above.     Findings and plan explained to the Patient. Patient discharged home with instructions regarding supportive care, medications, and reasons to return. The importance of close follow-up was reviewed.     Impression & Plan      Medical Decision " Making:  Patient presents to the emergency department for evaluation from his group home.  Patient has history of TBI and became agitated and struck his head.  Per their protocol is required to be evaluated in the emergency department when this happens.  Patient is no longer agitated, is appropriate, and cooperative with exam.  He has no acute focal or neurologic deficit.  Has no complaints, denies headache, and has appropriate interactions.  The patient's caregivers are comfortable taking him back. He has no signs of head injury, no tenderness to palpation.  At this point I see no indication for advanced imaging.  I am comfortable discharging to his group home with return precautions and follow-up instructions.  Diagnosis:    ICD-10-CM    1. Agitation R45.1        Disposition:  discharged to home with caregiver.    Hudson Arreola  5/24/2018    EMERGENCY DEPARTMENT  I, Hudson Arreola, am serving as a scribe at 4:31 PM on 5/24/2018 to document services personally performed by Glenny Briseno MD based on my observations and the provider's statements to me.        Glenny Briseno MD  05/26/18 1002

## 2018-05-24 NOTE — ED AVS SNAPSHOT
Emergency Department    [address] [place] [address]    Phone:  [phone]    Fax:  [phone]                                       Robin Blum   MRN: [id number]    Department:   Emergency Department   Date of Visit:  5/24/2018           After Visit Summary Signature Page     I have received my discharge instructions, and my questions have been answered. I have discussed any challenges I see with this plan with the nurse or doctor.    ..........................................................................................................................................  Patient/Patient Representative Signature      ..........................................................................................................................................  Patient Representative Print Name and Relationship to Patient    ..................................................               ................................................  Date                                            Time    ..........................................................................................................................................  Reviewed by Signature/Title    ...................................................              ..............................................  Date                                                            Time

## 2018-06-14 ENCOUNTER — HOSPITAL ENCOUNTER (OUTPATIENT)
Dept: NEUROLOGY | Facility: CLINIC | Age: 33
Setting detail: THERAPIES SERIES
Discharge: STILL ADMITTED OR ADMINISTRATIVE | End: 2018-06-14
Attending: NURSE PRACTITIONER

## 2018-06-14 DIAGNOSIS — F06.30 MOOD DISORDER IN CONDITIONS CLASSIFIED ELSEWHERE: ICD-10-CM

## 2018-06-14 DIAGNOSIS — F06.30 MOOD DISORDER AS LATE EFFECT OF TRAUMATIC BRAIN INJURY (H): ICD-10-CM

## 2018-06-14 DIAGNOSIS — F90.9 ADHD (ATTENTION DEFICIT HYPERACTIVITY DISORDER): ICD-10-CM

## 2018-06-14 DIAGNOSIS — S06.9XAS MOOD DISORDER AS LATE EFFECT OF TRAUMATIC BRAIN INJURY (H): ICD-10-CM

## 2018-08-27 ENCOUNTER — COMMUNICATION - HEALTHEAST (OUTPATIENT)
Dept: NEUROLOGY | Facility: CLINIC | Age: 33
End: 2018-08-27

## 2018-08-27 DIAGNOSIS — F06.30 MOOD DISORDER IN CONDITIONS CLASSIFIED ELSEWHERE: ICD-10-CM

## 2018-10-02 ENCOUNTER — HOSPITAL ENCOUNTER (OUTPATIENT)
Dept: NEUROLOGY | Facility: CLINIC | Age: 33
Setting detail: THERAPIES SERIES
Discharge: STILL ADMITTED OR ADMINISTRATIVE | End: 2018-10-02
Attending: PSYCHIATRY & NEUROLOGY

## 2018-10-02 DIAGNOSIS — F06.30 MOOD DISORDER IN CONDITIONS CLASSIFIED ELSEWHERE: ICD-10-CM

## 2018-10-24 ENCOUNTER — COMMUNICATION - HEALTHEAST (OUTPATIENT)
Dept: NEUROLOGY | Facility: CLINIC | Age: 33
End: 2018-10-24

## 2018-10-24 DIAGNOSIS — F32.A DEPRESSION: ICD-10-CM

## 2018-10-25 ENCOUNTER — COMMUNICATION - HEALTHEAST (OUTPATIENT)
Dept: NEUROLOGY | Facility: CLINIC | Age: 33
End: 2018-10-25

## 2018-10-25 DIAGNOSIS — F32.A DEPRESSION: ICD-10-CM

## 2019-01-17 ENCOUNTER — COMMUNICATION - HEALTHEAST (OUTPATIENT)
Dept: NEUROLOGY | Facility: CLINIC | Age: 34
End: 2019-01-17

## 2019-01-17 DIAGNOSIS — F06.30 MOOD DISORDER IN CONDITIONS CLASSIFIED ELSEWHERE: ICD-10-CM

## 2019-01-31 ENCOUNTER — HOSPITAL ENCOUNTER (OUTPATIENT)
Dept: NEUROLOGY | Facility: CLINIC | Age: 34
Setting detail: THERAPIES SERIES
Discharge: STILL ADMITTED OR ADMINISTRATIVE | End: 2019-01-31
Attending: PSYCHIATRY & NEUROLOGY

## 2019-01-31 DIAGNOSIS — F06.30 MOOD DISORDER IN CONDITIONS CLASSIFIED ELSEWHERE: ICD-10-CM

## 2019-02-18 ENCOUNTER — COMMUNICATION - HEALTHEAST (OUTPATIENT)
Dept: NEUROLOGY | Facility: CLINIC | Age: 34
End: 2019-02-18

## 2019-02-18 DIAGNOSIS — F32.A DEPRESSION: ICD-10-CM

## 2019-08-01 ENCOUNTER — HOSPITAL ENCOUNTER (OUTPATIENT)
Dept: NEUROLOGY | Facility: CLINIC | Age: 34
Setting detail: THERAPIES SERIES
Discharge: STILL ADMITTED OR ADMINISTRATIVE | End: 2019-08-01
Attending: PSYCHIATRY & NEUROLOGY

## 2019-08-01 DIAGNOSIS — F06.30 MOOD DISORDER IN CONDITIONS CLASSIFIED ELSEWHERE: ICD-10-CM

## 2019-11-12 ENCOUNTER — COMMUNICATION - HEALTHEAST (OUTPATIENT)
Dept: NEUROLOGY | Facility: CLINIC | Age: 34
End: 2019-11-12

## 2019-11-12 DIAGNOSIS — F06.30 MOOD DISORDER AS LATE EFFECT OF TRAUMATIC BRAIN INJURY (H): ICD-10-CM

## 2019-11-12 DIAGNOSIS — S06.9XAS MOOD DISORDER AS LATE EFFECT OF TRAUMATIC BRAIN INJURY (H): ICD-10-CM

## 2019-11-19 ENCOUNTER — HOSPITAL ENCOUNTER (OUTPATIENT)
Dept: NEUROLOGY | Facility: CLINIC | Age: 34
Setting detail: THERAPIES SERIES
Discharge: STILL ADMITTED OR ADMINISTRATIVE | End: 2019-11-19
Attending: PSYCHIATRY & NEUROLOGY

## 2019-11-19 DIAGNOSIS — F06.30 MOOD DISORDER IN CONDITIONS CLASSIFIED ELSEWHERE: ICD-10-CM

## 2019-11-26 ENCOUNTER — COMMUNICATION - HEALTHEAST (OUTPATIENT)
Dept: NEUROLOGY | Facility: CLINIC | Age: 34
End: 2019-11-26

## 2019-11-26 DIAGNOSIS — F06.30 MOOD DISORDER IN CONDITIONS CLASSIFIED ELSEWHERE: ICD-10-CM

## 2019-11-26 DIAGNOSIS — S06.9XAS MOOD DISORDER AS LATE EFFECT OF TRAUMATIC BRAIN INJURY (H): ICD-10-CM

## 2019-11-26 DIAGNOSIS — F06.30 MOOD DISORDER AS LATE EFFECT OF TRAUMATIC BRAIN INJURY (H): ICD-10-CM

## 2020-01-10 ENCOUNTER — COMMUNICATION - HEALTHEAST (OUTPATIENT)
Dept: NEUROLOGY | Facility: CLINIC | Age: 35
End: 2020-01-10

## 2020-01-20 ENCOUNTER — HOSPITAL ENCOUNTER (OUTPATIENT)
Dept: NEUROLOGY | Facility: CLINIC | Age: 35
Setting detail: THERAPIES SERIES
Discharge: STILL ADMITTED OR ADMINISTRATIVE | End: 2020-01-20
Attending: PSYCHOLOGIST

## 2020-01-20 DIAGNOSIS — F43.24 ADJUSTMENT DISORDER WITH DISTURBANCE OF CONDUCT: ICD-10-CM

## 2020-02-06 ENCOUNTER — HOSPITAL ENCOUNTER (OUTPATIENT)
Dept: NEUROLOGY | Facility: CLINIC | Age: 35
Setting detail: THERAPIES SERIES
Discharge: STILL ADMITTED OR ADMINISTRATIVE | End: 2020-02-06
Attending: PSYCHIATRY & NEUROLOGY

## 2020-02-06 DIAGNOSIS — F06.30 MOOD DISORDER IN CONDITIONS CLASSIFIED ELSEWHERE: ICD-10-CM

## 2020-03-27 ENCOUNTER — COMMUNICATION - HEALTHEAST (OUTPATIENT)
Dept: NEUROLOGY | Facility: CLINIC | Age: 35
End: 2020-03-27

## 2020-03-27 DIAGNOSIS — S06.9XAS MOOD DISORDER AS LATE EFFECT OF TRAUMATIC BRAIN INJURY (H): ICD-10-CM

## 2020-03-27 DIAGNOSIS — F06.30 MOOD DISORDER AS LATE EFFECT OF TRAUMATIC BRAIN INJURY (H): ICD-10-CM

## 2020-03-27 DIAGNOSIS — F06.30 MOOD DISORDER IN CONDITIONS CLASSIFIED ELSEWHERE: ICD-10-CM

## 2020-04-24 ENCOUNTER — COMMUNICATION - HEALTHEAST (OUTPATIENT)
Dept: NEUROLOGY | Facility: CLINIC | Age: 35
End: 2020-04-24

## 2020-04-24 DIAGNOSIS — F06.30 MOOD DISORDER AS LATE EFFECT OF TRAUMATIC BRAIN INJURY (H): ICD-10-CM

## 2020-04-24 DIAGNOSIS — F06.30 MOOD DISORDER IN CONDITIONS CLASSIFIED ELSEWHERE: ICD-10-CM

## 2020-04-24 DIAGNOSIS — S06.9XAS MOOD DISORDER AS LATE EFFECT OF TRAUMATIC BRAIN INJURY (H): ICD-10-CM

## 2020-04-30 ENCOUNTER — COMMUNICATION - HEALTHEAST (OUTPATIENT)
Dept: NEUROLOGY | Facility: CLINIC | Age: 35
End: 2020-04-30

## 2020-04-30 DIAGNOSIS — F06.30 MOOD DISORDER AS LATE EFFECT OF TRAUMATIC BRAIN INJURY (H): ICD-10-CM

## 2020-04-30 DIAGNOSIS — S06.9XAS MOOD DISORDER AS LATE EFFECT OF TRAUMATIC BRAIN INJURY (H): ICD-10-CM

## 2020-04-30 DIAGNOSIS — F06.30 MOOD DISORDER IN CONDITIONS CLASSIFIED ELSEWHERE: ICD-10-CM

## 2020-05-01 ENCOUNTER — COMMUNICATION - HEALTHEAST (OUTPATIENT)
Dept: NEUROLOGY | Facility: CLINIC | Age: 35
End: 2020-05-01

## 2020-05-21 ENCOUNTER — COMMUNICATION - HEALTHEAST (OUTPATIENT)
Dept: NEUROLOGY | Facility: CLINIC | Age: 35
End: 2020-05-21

## 2020-05-21 DIAGNOSIS — F06.30 MOOD DISORDER AS LATE EFFECT OF TRAUMATIC BRAIN INJURY (H): ICD-10-CM

## 2020-05-21 DIAGNOSIS — S06.9XAS MOOD DISORDER AS LATE EFFECT OF TRAUMATIC BRAIN INJURY (H): ICD-10-CM

## 2020-05-21 DIAGNOSIS — F06.30 MOOD DISORDER IN CONDITIONS CLASSIFIED ELSEWHERE: ICD-10-CM

## 2020-06-16 ENCOUNTER — COMMUNICATION - HEALTHEAST (OUTPATIENT)
Dept: NEUROLOGY | Facility: CLINIC | Age: 35
End: 2020-06-16

## 2020-06-16 DIAGNOSIS — F06.30 MOOD DISORDER AS LATE EFFECT OF TRAUMATIC BRAIN INJURY (H): ICD-10-CM

## 2020-06-16 DIAGNOSIS — S06.9XAS MOOD DISORDER AS LATE EFFECT OF TRAUMATIC BRAIN INJURY (H): ICD-10-CM

## 2020-06-16 DIAGNOSIS — F06.30 MOOD DISORDER IN CONDITIONS CLASSIFIED ELSEWHERE: ICD-10-CM

## 2020-07-21 ENCOUNTER — COMMUNICATION - HEALTHEAST (OUTPATIENT)
Dept: NEUROLOGY | Facility: CLINIC | Age: 35
End: 2020-07-21

## 2020-07-21 DIAGNOSIS — F06.30 MOOD DISORDER AS LATE EFFECT OF TRAUMATIC BRAIN INJURY (H): ICD-10-CM

## 2020-07-21 DIAGNOSIS — S06.9XAS MOOD DISORDER AS LATE EFFECT OF TRAUMATIC BRAIN INJURY (H): ICD-10-CM

## 2020-07-21 DIAGNOSIS — F06.30 MOOD DISORDER IN CONDITIONS CLASSIFIED ELSEWHERE: ICD-10-CM

## 2020-08-21 ENCOUNTER — COMMUNICATION - HEALTHEAST (OUTPATIENT)
Dept: NEUROLOGY | Facility: CLINIC | Age: 35
End: 2020-08-21

## 2020-08-21 DIAGNOSIS — S06.9XAS MOOD DISORDER AS LATE EFFECT OF TRAUMATIC BRAIN INJURY (H): ICD-10-CM

## 2020-08-21 DIAGNOSIS — F06.30 MOOD DISORDER IN CONDITIONS CLASSIFIED ELSEWHERE: ICD-10-CM

## 2020-08-21 DIAGNOSIS — F06.30 MOOD DISORDER AS LATE EFFECT OF TRAUMATIC BRAIN INJURY (H): ICD-10-CM

## 2020-09-17 ENCOUNTER — HOSPITAL ENCOUNTER (OUTPATIENT)
Dept: NEUROLOGY | Facility: CLINIC | Age: 35
Setting detail: THERAPIES SERIES
Discharge: STILL ADMITTED OR ADMINISTRATIVE | End: 2020-09-17
Attending: PSYCHIATRY & NEUROLOGY

## 2020-09-23 ENCOUNTER — COMMUNICATION - HEALTHEAST (OUTPATIENT)
Dept: NEUROLOGY | Facility: CLINIC | Age: 35
End: 2020-09-23

## 2020-09-23 DIAGNOSIS — F06.30 MOOD DISORDER IN CONDITIONS CLASSIFIED ELSEWHERE: ICD-10-CM

## 2020-09-23 DIAGNOSIS — S06.9XAS MOOD DISORDER AS LATE EFFECT OF TRAUMATIC BRAIN INJURY (H): ICD-10-CM

## 2020-09-23 DIAGNOSIS — F06.30 MOOD DISORDER AS LATE EFFECT OF TRAUMATIC BRAIN INJURY (H): ICD-10-CM

## 2020-10-20 ENCOUNTER — COMMUNICATION - HEALTHEAST (OUTPATIENT)
Dept: NEUROLOGY | Facility: CLINIC | Age: 35
End: 2020-10-20

## 2020-10-20 DIAGNOSIS — F06.30 MOOD DISORDER IN CONDITIONS CLASSIFIED ELSEWHERE: ICD-10-CM

## 2020-10-20 DIAGNOSIS — F06.30 MOOD DISORDER AS LATE EFFECT OF TRAUMATIC BRAIN INJURY (H): ICD-10-CM

## 2020-10-20 DIAGNOSIS — S06.9XAS MOOD DISORDER AS LATE EFFECT OF TRAUMATIC BRAIN INJURY (H): ICD-10-CM

## 2020-11-22 ENCOUNTER — COMMUNICATION - HEALTHEAST (OUTPATIENT)
Dept: NEUROLOGY | Facility: CLINIC | Age: 35
End: 2020-11-22

## 2020-11-22 DIAGNOSIS — S06.9XAS MOOD DISORDER AS LATE EFFECT OF TRAUMATIC BRAIN INJURY (H): ICD-10-CM

## 2020-11-22 DIAGNOSIS — F06.30 MOOD DISORDER AS LATE EFFECT OF TRAUMATIC BRAIN INJURY (H): ICD-10-CM

## 2020-11-22 DIAGNOSIS — F06.30 MOOD DISORDER IN CONDITIONS CLASSIFIED ELSEWHERE: ICD-10-CM

## 2020-12-21 ENCOUNTER — COMMUNICATION - HEALTHEAST (OUTPATIENT)
Dept: NEUROLOGY | Facility: CLINIC | Age: 35
End: 2020-12-21

## 2020-12-21 DIAGNOSIS — S06.9XAS MOOD DISORDER AS LATE EFFECT OF TRAUMATIC BRAIN INJURY (H): ICD-10-CM

## 2020-12-21 DIAGNOSIS — F06.30 MOOD DISORDER IN CONDITIONS CLASSIFIED ELSEWHERE: ICD-10-CM

## 2020-12-21 DIAGNOSIS — F06.30 MOOD DISORDER AS LATE EFFECT OF TRAUMATIC BRAIN INJURY (H): ICD-10-CM

## 2021-01-19 ENCOUNTER — COMMUNICATION - HEALTHEAST (OUTPATIENT)
Dept: NEUROLOGY | Facility: CLINIC | Age: 36
End: 2021-01-19

## 2021-01-19 DIAGNOSIS — S06.9XAS MOOD DISORDER AS LATE EFFECT OF TRAUMATIC BRAIN INJURY (H): ICD-10-CM

## 2021-01-19 DIAGNOSIS — F06.30 MOOD DISORDER IN CONDITIONS CLASSIFIED ELSEWHERE: ICD-10-CM

## 2021-01-19 DIAGNOSIS — F06.30 MOOD DISORDER AS LATE EFFECT OF TRAUMATIC BRAIN INJURY (H): ICD-10-CM

## 2021-03-18 ENCOUNTER — HOSPITAL ENCOUNTER (OUTPATIENT)
Dept: NEUROLOGY | Facility: CLINIC | Age: 36
Setting detail: THERAPIES SERIES
Discharge: STILL ADMITTED OR ADMINISTRATIVE | End: 2021-03-18
Attending: PSYCHIATRY & NEUROLOGY

## 2021-05-30 ENCOUNTER — RECORDS - HEALTHEAST (OUTPATIENT)
Dept: ADMINISTRATIVE | Facility: CLINIC | Age: 36
End: 2021-05-30

## 2021-05-31 ENCOUNTER — RECORDS - HEALTHEAST (OUTPATIENT)
Dept: ADMINISTRATIVE | Facility: CLINIC | Age: 36
End: 2021-05-31

## 2021-05-31 NOTE — PROGRESS NOTES
Outpatient Followup Psychiatric Evaluation      Pertinent History: The clinic relationship.  I last saw him in April 2016.  The patient has a history of significant cognitive mood and behavioral difficulties related to a prior traumatic brain injury.  After my April 2016 visit he had been followed by the nurse practitioner but reestablish contact with my clinic in October 2018.    Patient last saw the nurse practitioner and June 2018.  The patient was a bit more sedated at that time following a recent change in his Seroquel to extended release due to some increased agitation.  At that visit low-dose daytime Seroquel was added.    Patient is followed by therapist Ivet Rizo and last saw the therapist September 2018.  He moved to a new group home in 2018 and seemed to like that.  He seemed to be doing fairly well at that time.    I saw the patient in October 2018.  The patient was doing relatively well at that time and I did not make any psychotropic medication changes.    I saw the patient in January 2019.  At that time he was communicating a bit more and using gestures.  He continued to be frustrated with occasional conflict with caregivers.  We did not make any medication changes at that time.    Current Symptoms:   I met with the patient's mother and father for about 20 minutes prior to the appointment.  I also met with the patient, mother father and the staff member.  The parents had concerns again about the cares that the patient was giving.  They cited the fact that the patient was not participating much and they let him spend too much time on the iPad.  With the staff report that the patient often refuses to engage in activities.  We did spend some time talking about these issues.  The parents are considering looking at alternative group home options or possibly taking the patient back home again.  The patient had an episode a few nights ago where he became physically aggressive towards staff and police  were called apparently he did not go into the hospital and there were no changes made at that time.    The patient himself was a vague historian and communicated by shaking and nodding his head as well as thumb movements and he reported he was Doing fairly well.  He minimized any concerns or complaints and did not offer any questions.  The parents believe the patient's mood has been relatively good.  They recognize that he does not actively participate in many activities and they do recognize it is very difficult for the staff to get him to engage.  Despite this they want him to be doing more and they believe he is capable of doing much more than what he is currently doing.  They also reported the patient had neuropsych testing this year which showed he had plateaued.  They stated that they recognize that as well.  No new medical issues or diagnoses.  No new allergies.  No obvious side effects to the medication.    I did review records from the group home and filled out their paperwork.  The information provided by the group home.  I spent 35 minutes with the patient and family today.      Current Medications:  Current medications were reviewed.  Please see the chart for additional information.    Medication Compliance: Yes    Side Effects to Medications: No      Vitals:  Wt Readings from Last 3 Encounters:   No data found for Wt     Temp Readings from Last 3 Encounters:   No data found for Temp     BP Readings from Last 3 Encounters:   No data found for BP     Pulse Readings from Last 3 Encounters:   No data found for Pulse       Problem List (Please see medical records):  Active Problems:    * No active hospital problems. *        Mental Status Exam:   Appearance:  The patient was alert, comfortable and calm.  Her.  Smiled throughout the interview.  No agitation. Not currently in any pain. No evidence of any shortness of breath.  Behavior:  The patient is calm, cooperative, with no agitation or obvious distress.  The patient does participate. No restlessness. No reports of any recent behavioral dyscontrol.  Speech: Nonverbal.  Occasionally using his [employer] for a single yes or no response.  Occasionally shaking and nodding his head but overall largely noninteractive.  Mood/Affect:  Patient appears alert. No obvious depression or anxiety.  He is smiling throughout the interview.  No irritability. No lability.  Thought Content:  No evidence of any psychosis. No reports of any recent psychosis.  Suicidal or Homicidal Thoughts:  None apparent or reported. The patient denies any suicidal or homicidal ideation.   Thought Process/Formulation:  Able to track and follow.  The patient does need prompts and structure.  No racing thoughts.  Somewhat concrete.  Somewhat vague.  Associations:  Fair.  No recent change.  Able to process some very simple information.  Fund of Knowledge: Impaired.  No reports of any significant recent change.   Attention/Concentration: Needs prompts to attend. Concentration continues impaired.  Slow.  Somewhat concrete.  Not disorganized.  Insight:  Grossly unchanged.  Continues impaired  Judgement:  Grossly unchanged.  Continues impaired  Memory:  Grossly fair.  Needing prompts and structure.    Motor Status:  No recent change reported. No current tremor.   Orientation: No reports of any recent change.    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    Other:   Patient will return to this clinic in 6 months for medication check.  Agreed to call or return sooner with any questions, concerns or problems.    Continue with the support of the clinic, reassurance, and redirection. Staff  monitoring and ongoing assessments per team plan. Current psychotropic medication appears to represent the minimum effective dosage and appears medically necessary. We will continue to monitor and reassess. This team will utilize appropriate emergency services if necessary. I will make myself available if concerns or problems arise.    Robin Parker MD

## 2021-06-01 ENCOUNTER — RECORDS - HEALTHEAST (OUTPATIENT)
Dept: ADMINISTRATIVE | Facility: CLINIC | Age: 36
End: 2021-06-01

## 2021-06-02 ENCOUNTER — RECORDS - HEALTHEAST (OUTPATIENT)
Dept: ADMINISTRATIVE | Facility: CLINIC | Age: 36
End: 2021-06-02

## 2021-06-03 NOTE — TELEPHONE ENCOUNTER
Just spoke with Vitor he said only medications he wants to be avoided are ones with side effects of seizure activity.

## 2021-06-03 NOTE — TELEPHONE ENCOUNTER
All of the medications except Klonopin have the potential to slightly increase the seizure risk although I believe the risk is minimal at the current dosage.  I want to avoid Klonopin because I think will make him more confused and sleepy.  I have increased the as needed of Seroquel to 25-50 mg 3 times a day as needed.

## 2021-06-03 NOTE — TELEPHONE ENCOUNTER
November 01, 2019 Pt moved out of group home and moved back with family.  The first week was great for all of them.  They has appointments they had to get him to and every time they inna him he started becoming more agitated, aggressive, when they went to speech they set up a family plan to use his device and all of them to communicate better through it.  Went to primary yesterday, after that visit he became very upset, afterwards he starting becoming very obsessed with phone and ipad and wanted help from dad who couldn't do it right away, threw phone at floor tried throwing the ipad, grabbed his eye glasses before he was able to smash them.  Behaviors are becoming more and more and not able to redirect.  He was on the floor from 6:30PM-10:30PM last night.  He has been refusing 8 and 10 medication dosages. He made an effort to rip dad's sweatshirt and scratching him. Parents are thinking that maybe there could be medication adjustments made. I made an appointment at the request of Vitor but he is wondering in the mean time what should they do.

## 2021-06-03 NOTE — TELEPHONE ENCOUNTER
It sounds as if the patient has not been taking all of his medication.  I suspect this may be contributing.  They should reapproach and take those medications even if they are late.  I also believe that this may be a reaction to the change of his environment and may improve with time and getting used to his new schedule.  The father has had fairly definite opinions on medications in the past.  He does he have any medications that he would like me to avoid or to use?

## 2021-06-03 NOTE — PROGRESS NOTES
Outpatient Followup Psychiatric Evaluation      Pertinent History: The clinic relationship.  I last saw him in April 2016.  The patient has a history of significant cognitive mood and behavioral difficulties related to a prior traumatic brain injury.  After my April 2016 visit he had been followed by the nurse practitioner but reestablish contact with my clinic in October 2018.    Patient last saw the nurse practitioner and June 2018.  The patient was a bit more sedated at that time following a recent change in his Seroquel to extended release due to some increased agitation.  At that visit low-dose daytime Seroquel was added.    Patient is followed by therapist Ivet Rizo and last saw the therapist September 2018.  He moved to a new group home in 2018 and seemed to like that.  He seemed to be doing fairly well at that time.    I saw the patient in October 2018.  The patient was doing relatively well at that time and I did not make any psychotropic medication changes.    I saw the patient in January 2019.  At that time he was communicating a bit more and using gestures.  He continued to be frustrated with occasional conflict with caregivers.  We did not make any medication changes at that time.    I saw the patient in August 2019.  I met with the patient's mother and father for 20 minutes prior to that appointment.  They had some concerns about the patient's presentation and his cares.  They felt he was spending too much time on the iPad.  We did address this with the group home staff.  We made no medication changes at that time.  The patient moved back home with his mom and dad on October 30.  That went quite well for a week or so but then the patient began showing more behavioral issues.  Shortly before the November 2019 meeting I did increase the patient's PRN Seroquel in the context of been increased behavioral issues and refusals of multiple medications.    Current Symptoms:   Again, I met with the  patient's father prior to meeting with the patient today.  The patient has been living at home since October 30.  The first week or so went very well but the patient has been displaying increased behaviors recently.  The as needed Seroquel that we have increased has been helpful and he is less angry in the morning.  I spent quite a bit of time talking with the patient's father as well as later with the patient's mother about behavioral approaches with the patient and giving him some choices.  We also are going to have him see a behavioral analyst who will assist with that.  The patient himself is a poor historian but communicates through gestures as well as shaking and nodding his head and he is minimizing any concerns or complaints at this time.    There is been no evidence of any suicidality.  No psychosis.  No change in cognition.  He sleeping relatively well.  No change in appetite.  No new medical issues or diagnoses.  No obvious side effects to the medication and no new allergies.  Her family would like to continue with the recent medication adjustments.      Current Medications:  Current medications were reviewed.  Please see the chart for additional information.    Medication Compliance: Yes    Side Effects to Medications: No      Vitals:  Wt Readings from Last 3 Encounters:   No data found for Wt     Temp Readings from Last 3 Encounters:   No data found for Temp     BP Readings from Last 3 Encounters:   No data found for BP     Pulse Readings from Last 3 Encounters:   No data found for Pulse       Problem List (Please see medical records):  Active Problems:    * No active hospital problems. *        Mental Status Exam:   Appearance: No obvious pain or distress.  Sitting in a wheelchair.  No agitation.  No shortness of breath.  Patient appears relatively comfortable.  Adequately groomed and dressed.  Behavior: The patient is under fairly good behavioral control during the interview.  No agitation.  Patient  has had some recent increase in.    Please see above.  He was able to initiate.  Speech: Nonverbal.  He will occasionally shake and nod his head as well as offer hand signals.  Mood/Affect: Not significantly depressed or anxious.  No irritability.  No lability.  No evidence of any dov.  Thought Content: No obvious apparent psychosis.  No reports of any psychotic symptoms recently.  Suicidal or Homicidal Thoughts: No evidence or reports of any suicidal or homicidal ideation  Thought Process/Formulation: Somewhat concrete.  Somewhat vague.  Does needs some structure and occasional prompts.  Associations: No obvious recent change.  Patient is somewhat concrete but is processing some information.  Fund of Knowledge: Continues impaired with no recent change.  Attention/Concentration: Concentration and attention continue to be somewhat impaired.  No obvious significant change.  Insight: Continues impaired.  No recent change.  Judgement:  Grossly unchanged.  Continues impaired  Memory: No obvious recent change.  Needing prompts and structure.    Motor Status:  No recent change reported. No current tremor.   Orientation: No reports of any recent change..    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    Other:   We have recently increased the patient's Seroquel as needed.  We will continue with the other medications at this time.  The patient and family will be seeing a behavioral analyst.    Continue with the support of the clinic, reassurance, and redirection. Staff monitoring and ongoing assessments per team plan. Current psychotropic medication appears to represent the minimum effective  dosage and appears medically necessary. We will continue to monitor and reassess. This team will utilize appropriate emergency services if necessary. I will make myself available if concerns or problems arise.    Robin Parker MD

## 2021-06-05 NOTE — TELEPHONE ENCOUNTER
Patients father called asking if we have outpatient behavioral services located here at [place].  Would that be considered psychotherapy?

## 2021-06-05 NOTE — PROGRESS NOTES
Psychology Progress Note    Date: January 20, 2020    Time length and type of treatment: 21 minutes (10:57 AM to 11:18 AM), individual therapy    Necessity: This session is necessary to establish rapport and obtain preliminary information regarding patient and family concerns.    Intervention: This writer utilized motivational interviewing, active listening, reassurance and support in the context of cognitive behavioral therapy to address the above.      Mental Status:   Grooming: Within normal limits  Attire: Appropriate  Age: Appears Stated  Behavior Towards Examiner: Cooperative  Motor Activity: Patient relies upon a wheelchair   Eye Contact: Appropriate  Mood: Euthymic  Affect: Ranged from euthymic to mildly irritable  Speech/Language: Patient is nonverbal and communicates through hand gestures and nonverbal vocalizations  Attention: Difficult to assess due to limited communication  Concentration: Difficult to assess due to limited communication but appeared within broad normal limits  Thought Process: Difficult to assess due to limited communication, but appeared to track and understand conversation  Thought Content: No evidence of delusions or hallucinations  Orientation: Not established  Memory: Impairment  Judgement: Impairment  Estimated Intelligence: Difficult to establish due to limited verbal communication  Demonstrated Insight: Difficult to establish due to limited verbal communication  Fund of Knowledge: Difficult to establish due to limited verbal communication      Progress:   The patient was accompanied by his parents who are his legal guardians.  He is nonverbal and communicates through hand gestures and verbalizations.  The patient's parents reported that the patient suffered a traumatic jose injury approximately 12 years ago and then suffered an anoxic injury approximately 10 years ago. As a result of these events, the patient comprehends conversations but has lost the ability to communicate  verbally.  The patient's parents stated that their son currently lives with him and they detailed some of the ways they work with his behavior, but he continues to act out aggressively when frustrated.  They reported that their son benefited from seeing a behaviorist and therapist who worked collaboratively at [facility] but they have since moved and are hoping to locate services closer to where they now live. They are hoping to locate a new psychologist and/or behaviorist who can continue to help their son manage his behavior, especially since it feels like they have plateaued.    The patient and his parents were informed that working with nonverbal clients is outside my area of expertise and that I would likely not be the best person to work with their son. Because the parents evidenced good insight and detailed appropriate behavioral management strategies they are using with their son, it was suggested that there may be biological limitations to how much additional progress is realistic and continuing to manage the patient's environment will be essential. It was jointly agreed that parents would pursue services with a therapist who has prior experience in working with nonverbal patients such as their son, and they reported that they had a list of several other psychologists that they believed would be appropriate.      Plan: Patient and his family will pursue care with a therapist who has expertise in providing individual therapy to nonverbal patients.  No further follow up is planned.      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

## 2021-06-05 NOTE — PROGRESS NOTES
Outpatient Followup Psychiatric Evaluation      Pertinent History: The clinic relationship.  I last saw him in April 2016.  The patient has a history of significant cognitive mood and behavioral difficulties related to a prior traumatic brain injury.  After my April 2016 visit he had been followed by the nurse practitioner but reestablish contact with my clinic in October 2018.    Patient last saw the nurse practitioner and June 2018.  The patient was a bit more sedated at that time following a recent change in his Seroquel to extended release due to some increased agitation.  At that visit low-dose daytime Seroquel was added.    Patient is followed by therapist Ivet Rizo and last saw the therapist September 2018.  He moved to a new group home in 2018 and seemed to like that.  He seemed to be doing fairly well at that time.    I saw the patient in October 2018.  The patient was doing relatively well at that time and I did not make any psychotropic medication changes.    I saw the patient in January 2019.  At that time he was communicating a bit more and using gestures.  He continued to be frustrated with occasional conflict with caregivers.  We did not make any medication changes at that time.    I saw the patient in August 2019.  I met with the patient's mother and father for 20 minutes prior to that appointment.  They had some concerns about the patient's presentation and his cares.  They felt he was spending too much time on the iPad.  We did address this with the group home staff.  We made no medication changes at that time.  The patient moved back home with his mom and dad on October 30.  That went quite well for a week or so but then the patient began showing more behavioral issues.  Shortly before the November 2019 meeting I did increase the patient's PRN Seroquel in the context of been increased behavioral issues and refusals of multiple medications.    I saw the patient in November 2019.  The patient  had been at his current group home for about and was having some behaviors possibly related to adjustment.  The PRN Seroquel had been somewhat helpful.  There was questions as to whether the patient could benefit from a behavioral analyst and they were going to pursue that as well.    Current Symptoms:   Patient again is a vague historian.  He is now living at his parents house.  He presents with his mother and father that report that overall things are going fairly well but the patient continues with anger outbursts and oftentimes this are directed towards dad.  This even occurs in the car and they are now going to move him in the backseat with the child locks on.  They report the patient is sleeping well.  No change in cognition.  Overall they believe they are giving him a better quality of life living at home and he does get out in the community at times.    They are starting with a psychologist to work on behavior management.  She is going to be visiting the house tomorrow.  We spent quite a bit of time talking about that.  Her initial impressions from talking with the family is at the patient is having a hard time labeling emotions and has been much displaced anger.  That does seem consistent with what I am seeing as well.  During the interview today the patient had periods where he was giving his father the finger and clenching his jaw and visibly upset with the father's report on the patient's behavioral issues.    Despite the behaviors they only use the PRN Seroquel once.  We talked about the possibility of after a new baseline is established with the therapist that they could try 7 days on and 7 days off of the Seroquel in the mornings and see whether it is helpful.  We discussed possibly adjusting the medications as we get more data and they like to that option.    There is been no new medical diagnoses or treatments.  No new allergies.  No obvious side effects to the medication.  They were comfortable  continuing with the current treatment plan.        Current Medications:  Current medications were reviewed.  Please see the chart for additional information.    Medication Compliance: Yes    Side Effects to Medications: No      Vitals:  Wt Readings from Last 3 Encounters:   No data found for Wt     Temp Readings from Last 3 Encounters:   No data found for Temp     BP Readings from Last 3 Encounters:   No data found for BP     Pulse Readings from Last 3 Encounters:   No data found for Pulse       Problem List (Please see medical records):  Active Problems:    * No active hospital problems. *        Mental Status Exam:   Appearance: Flat and slow.  No obvious pain.  Limited eye contact and impaired effort.  Not currently restless or agitated.  Wheelchair.  Limited ability to participate.  Behavior: Limited initiation.  The patient appears flat and somewhat disinterested.  He became quickly agitated towards his father.  Not agitated or threatening towards me or his mother.  Not currently restless.  Speech:.  He was mute.  The patient did become obviously upset and was shaking and giving his father the finger as well as grinding his teeth during the interview at times.  Mood/Affect: Patient appears depressed, flat and slow.  No dov.  He was angry and agitated periodically triggered by his father's statements.  No significant anxiety.  Thought Content: No evidence of any obvious current psychosis.  No reports of any recent psychosis.  Suicidal or Homicidal Thoughts: None apparent.  None reported.  Thought Process/Formulation: Slow and flat.  No racing thoughts.  There is a delay.  Patient needs prompts.  Limited effort.  Not obviously loose.  Associations: Slow with limited participation.  Fund of Knowledge: Limited effort.  No obvious recent change.  Attention/Concentration: Patient needs prompts and structure.  Limited initiation.  Limited effort.  Distractible.  Baseline impaired.  Insight:  Limited effort.  No  obvious recent change.  Judgement:  Limited effort.   Memory: Disinterested with limited initiation and limited participation.  Slow and flat.  Motor Status:  Limited participation. No current tremor.  Orientation: Apparently no recent change.  Limited effort at this time..      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  The family may utilize the PRN Seroquel in the mornings to see whether this is beneficial.    Other:   The patient is starting with an individual therapist that will be doing home visits tomorrow.  We will await the results of that.    Continue with the support of the clinic, reassurance, and redirection. Staff monitoring and ongoing assessments per team plan. Current psychotropic medication appears to represent the minimum effective dosage and appears medically necessary. We will continue to monitor and reassess. This team will utilize appropriate emergency services if necessary. I will make myself available if concerns or problems arise.    Robin Parker MD

## 2021-06-05 NOTE — PROGRESS NOTES
Patient's impression of how medication is working? Pt has been having occasional outbursts.     Compliant with Medication? Yes-a few mornings skipped 8AM medications because of sleeping in      Side Effects: None    Pain (0-10) No  Appetite change No  Sleep disturbance No  Change in energy No  Change in interest Yes  Change in concentration Yes  Psychosis/Hallucinations No  Negative thoughts Yes  Mood swings Yes  Alcohol use No  Drug use No  Anxiety high-Anger towards father.  Sad/depressed mood none

## 2021-06-05 NOTE — TELEPHONE ENCOUNTER
Yes.  He could be set up with individual psychotherapy.  He may benefit from a behavioral analyst which we do not currently have at this clinic.  That could be pursued outside of our system.

## 2021-06-07 NOTE — TELEPHONE ENCOUNTER
Called [facility] to clarify insurance information and per pharmacist this request was sent in error.  She got it to process through insurance late yesterday for a 30 day supply, no PA needed.    No further action needed per pharmacy.

## 2021-06-07 NOTE — TELEPHONE ENCOUNTER
Prior Authorization Request  Who s requesting:  Pharmacy  Pharmacy Name and Location: [facility]   Medication Name: DESVENLAFAXINE ER 24HR 100 MG TABLET SUNP  Insurance Plan: MEDICARE   Insurance Member ID Number:  [id number]  CoverMyMeds Key: [id number]/A  Informed patient that prior authorizations can take up to 10 business days for response:   No  Okay to leave a detailed message: Yes

## 2021-06-09 NOTE — PROGRESS NOTES
.  Assessment / Impression     Dementia with behavioral disturbance secondary to TBI; recent increase in behaviors now stabilized.  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  Follow-up medication evaluation.  Patient's impression of how medication is working? Patient states they are working well and does not want them changed.  Patient's father states that the medications are making him tired  Compliant with medication? yes    Side effects:None       Plan:     Reviewed medications and behaviors, no changes made at this time.  Agreed with recommendations of limiting caffeine.  Will have patient follow up in 6 months however fees having more issues and concerns, staff can call or have patient come in sooner.    Subjective:      HPI: Robin Blum is a 31 y.o. male with  dementia with behavioral disturbance and mood disorder secondary to TBI.  He is here for follow-up medication evaluation along with the staff.  With a past 3 weeks, patient had increased behaviors.  At one point they had to call 911 as they could not calm patient down.  He was violent against himself and staff.  He would wake up irritable and his movement would continually deep teary throughout the day.  Staff felt was partially due to his parents moving, that was a stressor for him.  He kept stating he wanted to move to.  They also took away his power wheelchair and since then his behavior has dramatically improved.  Patient states he no longer wants to be in that chair and it was a trigger for his behaviors.  Staff mentions that his father still feels that the psychotropics are making him more tired but Robin disagrees.  He does not want to make any changes in his medications.  They are limiting his caffeine which has been helpful in controlling his behavior as well.  This is recommended by his neurologist.    Robin  has a past medical history of Dysphagia and Head injury.  Robin  has no past surgical history on file.  Aloe and  Hydrocodone  Current Outpatient Prescriptions   Medication Sig Dispense Refill     acetaminophen (TYLENOL) 500 MG tablet Take 500 mg by mouth daily as needed for pain.       ascorbic acid (VITAMIN C) 250 MG tablet Take 250 mg by mouth daily.       cholecalciferol, vitamin D3, (VITAMIN D3) 4,000 unit cap Take 1 tablet by mouth daily.       clonazePAM (KLONOPIN) 1 MG tablet 1 mg by Gastrostomy Tube route 4 (four) times a day.       desvenlafaxine succinate (PRISTIQ) 100 MG 24 hr tablet Take 100 mg by mouth daily.       docusate sodium (COLACE) 100 MG capsule Take 200 mg by mouth bedtime.       hydrocortisone 1 % cream Apply topically 2 (two) times a day.       ibuprofen (ADVIL,MOTRIN) 200 MG tablet Take 200 mg by mouth every 6 (six) hours as needed for pain.       lamoTRIgine (LAMICTAL) 150 MG tablet Take 300 mg by mouth 2 (two) times a day.        loperamide (IMODIUM) 2 mg capsule Take 2 mg by mouth 4 (four) times a day as needed for diarrhea.       LORAZEPAM INTENSOL ORAL Take by mouth as needed (for seizures).       multivitamin with minerals tablet Take 1 tablet by mouth daily.       omeprazole (PRILOSEC) 20 MG capsule Take 20 mg by mouth daily.       polyethylene glycol (MIRALAX) 17 gram packet 17 g by Gastrostomy Tube route as needed.       QUEtiapine (SEROQUEL) 25 MG tablet Take 0.5 tablets (12.5 mg total) by mouth every morning. Take 0.5 tablet with his first meal of the day. 15 tablet 3     QUEtiapine (SEROQUEL) 25 MG tablet Take 1 tablet (25 mg total) by mouth 3 (three) times a day as needed. 90 tablet 2     QUEtiapine (SEROQUEL) 50 MG tablet Take 1 tablet (50 mg total) by mouth bedtime. 30 tablet 6     sennosides (SENNOSIDES) 8.8 mg/5 mL Syrp syrup 8.8 mg by Gastrostomy Tube route daily as needed.       TiZANidine (ZANAFLEX) 4 MG capsule Take 4 mg by mouth 2 times a day at 6:00 am and 4:00 pm.       No current facility-administered medications for this encounter.      No family history on file.  Social  History     Social History     Marital status: Single     Spouse name: N/A     Number of children: N/A     Years of education: N/A     Social History Main Topics     Smoking status: Not on file     Smokeless tobacco: Not on file     Alcohol use Not on file     Drug use: Not on file     Sexual activity: Not on file     Other Topics Concern     Not on file     Social History Narrative     No narrative on file       The following portions of the patient's history were reviewed and updated as appropriate: allergies, current medications, past family history, past medical history, past social history, past surgical history and problem list.    Review of Systems  Constitutional: negative  Neurological: negative for seizures  Behavioral/Psych: positive for behavior problems, irritability and mood swings, negative for anxiety, depression, loss of interest in favorite activities and sleep disturbance       Objective:   @VS    Mental Status Exam:     Appearance: Patient is well groomed, pleasant and cooperative.  Good eye contact.  Attempts to communicate nonverbally as well as using his DynaVox.    Speech / Language : Aphasic and uses gestures and dynavox to communicate    Associations: appropriate    Alert and oriented X 3:self    Mood: Euthymic  Affect: Jovial    Suicidal / Homicidal ideation:none  If yes, document safety plan:    Fund of knowledge: fair    Thought process:Slow    Judgement / insight: Impairment and Moderate    Recent and remote memory: Baseline impaired.    Attention: Within normal  Concentration: Within normal    Motor status: W/C bound, no involuntary movements    Scores: NA    Change in medication? No    Education    Reviewed risks/benefits of medication      Physical Exam: General appearance: alert, appears stated age, cooperative and no distress  Neurologic: Mental status: alertness: alert, orientation: person, affect: mood-congruent, thought content exhibits logical connections.

## 2021-06-09 NOTE — PROGRESS NOTES
Patient's impression of how medication is working? Pt father said some of the med may make pt fell drowsy. Pt have increased behaviors     Compliant with Medication? yes    Side Effects: None    Current Symptoms : No    Pain (0-10) No  Appetite change No  Negative thoughts No  Alcohol use No  Drug use No  Mood swings Yes  Sleep disturbance No  Change in interest No  Change in energy No  Change in concentration No  Psychosis/Hallucinations No  Anxiety mild  Sad/depressed mood mild

## 2021-06-10 ENCOUNTER — OFFICE VISIT - HEALTHEAST (OUTPATIENT)
Dept: NEUROLOGY | Facility: CLINIC | Age: 36
End: 2021-06-10

## 2021-06-11 NOTE — PROGRESS NOTES
Outpatient Followup Psychiatric Evaluation      Pertinent History: The clinic relationship.  I last saw him in April 2016.  The patient has a history of significant cognitive mood and behavioral difficulties related to a prior traumatic brain injury.  After my April 2016 visit he had been followed by the nurse practitioner but reestablish contact with my clinic in October 2018.    Patient last saw the nurse practitioner and June 2018.  The patient was a bit more sedated at that time following a recent change in his Seroquel to extended release due to some increased agitation.  At that visit low-dose daytime Seroquel was added.    Patient is followed by therapist Ivet Rizo and last saw the therapist September 2018.  He moved to a new group home in 2018 and seemed to like that.  He seemed to be doing fairly well at that time.    I saw the patient in October 2018.  The patient was doing relatively well at that time and I did not make any psychotropic medication changes.    I saw the patient in January 2019.  At that time he was communicating a bit more and using gestures.  He continued to be frustrated with occasional conflict with caregivers.  We did not make any medication changes at that time.    I saw the patient in August 2019.  I met with the patient's mother and father for 20 minutes prior to that appointment.  They had some concerns about the patient's presentation and his cares.  They felt he was spending too much time on the iPad.  We did address this with the group home staff.  We made no medication changes at that time.  The patient moved back home with his mom and dad on October 30.  That went quite well for a week or so but then the patient began showing more behavioral issues.  Shortly before the November 2019 meeting I did increase the patient's PRN Seroquel in the context of been increased behavioral issues and refusals of multiple medications.    I saw the patient in November 2019.  The patient  had been at his current group home for about and was having some behaviors possibly related to adjustment.  The PRN Seroquel had been somewhat helpful.  There was questions as to whether the patient could benefit from a behavioral analyst and they were going to pursue that as well.     I saw the patient in November 2019. He was living in his parents house at that time. He was doing fairly well but had occasional outbursts. Oftentimes these were directed towards his father in frustration. They were starting with a behavioral analyst to begin working on those issues.    Current Symptoms:    I interviewed the patient, the patient's mother and the patient's father by videophone. They were cooperative and I was able to obtain adequate history. They reported that the patient is doing fairly well. He's been living at home now for about a year and although it's had challenges it has overall gone very well. They state that the patient is becoming more active in doing more things. He now has a tri-cycle that he rides. He's also been equipped with a handicap van and so he is a bit more freedom due to that. They're looking into doing some possible home modification.     The patient's mood has been relatively good. At times he becomes upset and has periods of aggression were he will throw himself on the floor or strike out. Due to his immobility the family remain safe. This occurs about one time a month and right afterwards the patient is remorseful. The family states they are less intense and less frequent and they are able to manage it. The patient is only had PRN Seroquel about three times this year. The family states that often times after the point that they need the medication it's too late for the PRN to be helpful. We talked about the possibility of offering more latitude with giving a PRN early or for a few days in a row to see if that was helpful and the family agreed to this.     There's been no change in cognition.  No hallucinations or delusions. No suicidality. No new medical issues or concerns. The patient continues to follow with [facility]. They noted no significant side effects to the medication and the family is in agreement with the treatment plan. They state they will notify the staff here if there are any new issues or concerns or any problems.      The family reports the patient has been followed by a virtual behavioral analyst and this is gone extremely well and they believe this is been very helpful.      Current Medications:  Current medications were reviewed.  Please see the chart for additional information.    Medication Compliance: Yes    Side Effects to Medications: No      Vitals:  Wt Readings from Last 3 Encounters:   No data found for Wt     Temp Readings from Last 3 Encounters:   No data found for Temp     BP Readings from Last 3 Encounters:   No data found for BP     Pulse Readings from Last 3 Encounters:   No data found for Pulse       Problem List (Please see medical records):  Active Problems:    * No active hospital problems. *        Mental Status Exam:   Appearance: Patient presents appearing relatively comfortable.  He was interviewed via the videophone. He was smiling and bright throughout the interview. No obvious distress.  Not short of breath.  No obvious pain.  Behavior: Patient maintains good behavioral control.  He is currently not restless or agitated. He has occasional outbursts as described above.  Speech: No obvious recent change.  Simple answers.  Fairly concrete.  He continues to need structure and prompts.  Mood/Affect: Not significantly depressed or anxious.  No lability or agitation.  Thought Content: No reports of any recent psychosis.  No evidence of any psychosis.  Suicidal or Homicidal Thoughts:  None apparent or reported. The patient denies any suicidal or homicidal ideation.   Thought Process/Formulation: Slow.  Somewhat concrete and vague.  He is able to track and follow  some conversation.  No racing thoughts.  Associations: No obvious recent change.  Somewhat concrete.  Able to process some simple information.  Fund of Knowledge: No significant recent change.  He continues somewhat impaired.  Attention/Concentration: Slow.  Somewhat concrete.  Still with impaired concentration.  Insight:  Grossly unchanged.  Continues impaired  Judgement:  Grossly unchanged.  Continues impaired  Memory:  Grossly fair.  Needing prompts and structure.    Motor Status:  No recent change reported. No current tremor.   Orientation: No reports of any recent change. Continues impaired.    Diagnosis managed and treated at today's visit :    Major neurocognitive disorder with resultant mood and behavioral disturbance as well as cognitive deficits secondary to prior TBI in or about 2007    Rule out superimposed major depressive disorder, chronic, moderate, without psychotic features     The patient and family were interviewed for 24 minutes.    Plan:  Medication Adjustment:   As stated above the family will utilize the PRN Koi of the more frequently and try to anticipate behavioral issues earlier. We will consider a future increase in the Seroquel if we find the patient does better with a slightly higher dosage.    Other:    The patient will continue with the behavioral therapist. The family reports this is been quite helpful.    Continue with the support of the clinic, reassurance, and redirection. Staff monitoring and ongoing assessments per team plan. Current psychotropic medication appears to represent the minimum effective dosage and appears medically necessary. We will continue to monitor and reassess. This team will utilize appropriate emergency services if necessary. I will make myself available if concerns or problems arise.    Robin Parker MD

## 2021-06-11 NOTE — TELEPHONE ENCOUNTER
rcvd clients guardian (mom pat)   regarding refill on:   desvenlafaxine succinate (PRISTIQ) 100 MG 24 hr   Per Mom they are leaving on vacation 9/24 and need re-fill ordered ASAP so they   Can pick it up today.   [facility]   [phone]    Pharmacy Address and Hours     Address  Hours    [address] [address]

## 2021-06-12 NOTE — PROGRESS NOTES
.  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  Follow-up medication evaluation.  Patient's impression of how medication is working? Staff and patient state working well  Compliant with medication? yes    Side effects:None       Plan:     Reviewed medications and behaviors, no changes made today.  Recommend patient discussed no caffeine issue with his neurologist.  We will have patient follow-up in 6 months.    Subjective:      HPI: Robin Blum is a 32 y.o. male with and with behavioral disturbance and mood disorder secondary to TBI.  He is here for follow-up medication evaluation along with the staff.  Patient has been doing well, behaviors have been stable.  His neurologist did take him off the a.m. Seroquel per his parents request.  About a week after that occurred his behaviors worsen.  He had a lot of anger and aggression was not able to be redirected.  He was put back on the Seroquel and has been doing fine since.  His neurologist recommended no caffeine, patient is upset with this.  He does make gestures and vocalizations about his anger over this issue.  Staff did talk with him about readdressing the issue with neurologist and patient felt that was a good idea.  Patient will be starting a day program at [facility] next week.  One day of work and a second day of classes.  Patient is looking forward to that.  No new medical issues.    Robin  has a past medical history of Dysphagia and Head injury.  Robin  has no past surgical history on file.  Aloe and Hydrocodone  Current Outpatient Prescriptions   Medication Sig Dispense Refill     acetaminophen (TYLENOL) 500 MG tablet Take 500 mg by mouth daily as needed for pain.       ascorbic acid (VITAMIN C) 250 MG tablet Take 250 mg by mouth daily.       cholecalciferol, vitamin D3, (VITAMIN D3) 4,000 unit cap Take 1 tablet by mouth daily.       clonazePAM (KLONOPIN) 1 MG  tablet 1 mg by Gastrostomy Tube route 4 (four) times a day.       desvenlafaxine succinate (PRISTIQ) 100 MG 24 hr tablet Take 100 mg by mouth daily.       docusate sodium (COLACE) 100 MG capsule Take 200 mg by mouth bedtime.       hydrocortisone 1 % cream Apply topically 2 (two) times a day.       ibuprofen (ADVIL,MOTRIN) 200 MG tablet Take 200 mg by mouth every 6 (six) hours as needed for pain.       lamoTRIgine (LAMICTAL) 150 MG tablet Take 300 mg by mouth 2 (two) times a day.        loperamide (IMODIUM) 2 mg capsule Take 2 mg by mouth 4 (four) times a day as needed for diarrhea.       LORAZEPAM INTENSOL ORAL Take by mouth as needed (for seizures).       multivitamin with minerals tablet Take 1 tablet by mouth daily.       omeprazole (PRILOSEC) 20 MG capsule Take 20 mg by mouth daily.       polyethylene glycol (MIRALAX) 17 gram packet 17 g by Gastrostomy Tube route as needed.       QUEtiapine (SEROQUEL) 25 MG tablet Take 0.5 tablets (12.5 mg total) by mouth every morning. Take 0.5 tablet with his first meal of the day. 15 tablet 3     QUEtiapine (SEROQUEL) 25 MG tablet Take 1 tablet (25 mg total) by mouth 3 (three) times a day as needed. 90 tablet 2     QUEtiapine (SEROQUEL) 50 MG tablet Take 1 tablet (50 mg total) by mouth bedtime. 30 tablet 6     sennosides (SENNOSIDES) 8.8 mg/5 mL Syrp syrup 8.8 mg by Gastrostomy Tube route daily as needed.       TiZANidine (ZANAFLEX) 4 MG capsule Take 4 mg by mouth 2 times a day at 6:00 am and 4:00 pm.       No current facility-administered medications for this encounter.      No family history on file.  Social History     Social History     Marital status: Single     Spouse name: N/A     Number of children: N/A     Years of education: N/A     Social History Main Topics     Smoking status: Not on file     Smokeless tobacco: Not on file     Alcohol use Not on file     Drug use: Not on file     Sexual activity: Not on file     Other Topics Concern     Not on file     Social  History Narrative     No narrative on file       The following portions of the patient's history were reviewed and updated as appropriate: allergies, current medications, past family history, past medical history, past social history, past surgical history and problem list.    Review of Systems  Constitutional: negative  Neurological: negative  Behavioral/Psych: negative for aggressive behavior, anxiety, behavior problems, fatigue, irritability, loss of interest in favorite activities, mood swings and sleep disturbance       Objective:   @VS    Mental Status Exam:     Appearance: Patient is casually dressed, pleasant cooperative.  Did have some anger outbursts regarding caffeine but patient was able to calm himself.    Speech / Language : Aphasic    Associations: appropriate    Alert and oriented X 3:self and place    Mood: Euthymic  Affect: Jovial    Suicidal / Homicidal ideation:none  If yes, document safety plan:    Fund of knowledge: fair    Thought process:Slow    Judgement / insight: Impairment    Recent and remote memory: Baseline impaired.    Attention: Within normal  Concentration: Within normal    Motor status: W/C bound, no involuntary movements    Scores: NA    Change in medication? No    Education    Reviewed risks/benefits of medication      Physical Exam: General appearance: alert, appears stated age, cooperative and no distress  Neurologic: Mental status: alertness: alert, orientation: person, place, affect: mood-congruent, thought content exhibits logical connections.

## 2021-06-16 NOTE — PROGRESS NOTES
"Video Visit  Robin Blum is a 35 y.o. male who is being evaluated via a billable video visit in light of the ongoing global health crisis (COVID-19) that requires us to abide by social distancing mandates in order to reduce the risk of COVID-19 exposure.       The patient has been notified of following:     \"This video visit will be conducted via a video call between you and your physician/provider. We have found that certain health care needs can be provided without the need for a physical exam.  This service lets us provide the care you need with a short phone/video conversation.  If a prescription is necessary we can send it directly to your pharmacy.  If lab work is needed we can place an order for that and you can then stop by our lab to have the test done at a later time.    If during the course of the call the physician/provider feels a video visit is not appropriate, you will not be charged for this service.\"     Patient has given verbal consent to a video visit? Yes    Consent was obtained for this service by one of our care team members    Psychology  No        Any new medication (other provider):   No   Meds started at last appointment  No  family will utilize the PRN Seroquel more frequently and try to anticipate behavioral issues earlier. We will consider a future increase in the Seroquel if we find the patient does better with a slightly higher dosage.  Meds increased at last appointment    No      Patient's impression of how medication is working? Pt has been going to speech therapy which has been helping with communication.  He has also been in better moods and able to get more attention where he is living with his parents now and has a caregiver as well.  No complaints at this time.  Pt has been pretty stable according to father.       Compliant with Medication? Yes     Side Effects: No  Pain (0-10) No   Appetite change No   Sleep disturbance Yes   Change in energy Yes   Change in interest No "   Change in concentration No   Psychosis/Hallucinations No   Negative thoughts No   Mood swings Yes   Alcohol use No   Drug use No   Anxiety No   Sad/depressed mood No                                                        Phone Start Time: 10:50am    Phone End Time:  10:55am    Total time of phone conversation: 5 minutes    There were no vitals filed for this visit.    Patient would like the video invitation sent by: Beautyl[employer]   Number/e-mail address:[phone]    Rosemarie Mcghee CMA    Outpatient Followup TBI Evaluation     Pertinent History:  The patient was referred to this clinic for further assessment and treatment .  The patient has a history of significant cognitive mood and behavioral difficulties related to a prior traumatic brain injury.  After my April 2016 visit he had been followed by the nurse practitioner but reestablish contact with my clinic in October 2018.     Patient last saw the nurse practitioner and June 2018.  The patient was a bit more sedated at that time following a recent change in his Seroquel to extended release due to some increased agitation.  At that visit low-dose daytime Seroquel was added.     Patient is followed by therapist Ivet Rizo and last saw the therapist September 2018.  He moved to a new group home in 2018 and seemed to like that.  He seemed to be doing fairly well at that time.     I saw the patient in October 2018.  The patient was doing relatively well at that time and I did not make any psychotropic medication changes.     I saw the patient in January 2019.  At that time he was communicating a bit more and using gestures.  He continued to be frustrated with occasional conflict with caregivers.  We did not make any medication changes at that time.     I saw the patient in August 2019.  I met with the patient's mother and father for 20 minutes prior to that appointment.  They had some concerns about the patient's presentation and his cares.  They felt he was  spending too much time on the iPad.  We did address this with the group home staff.  We made no medication changes at that time.  The patient moved back home with his mom and dad on October 30.  That went quite well for a week or so but then the patient began showing more behavioral issues.  Shortly before the November 2019 meeting I did increase the patient's PRN Seroquel in the context of been increased behavioral issues and refusals of multiple medications.     I saw the patient in November 2019.  The patient had been at his current group home for about and was having some behaviors possibly related to adjustment.  The PRN Seroquel had been somewhat helpful.  There was questions as to whether the patient could benefit from a behavioral analyst and they were going to pursue that as well.      I saw the patient in November 2019. He was living in his parents house at that time. He was doing fairly well but had occasional outbursts. Oftentimes these were directed towards his father in frustration. They were starting with a behavioral analyst to begin working on those issues.    I saw the patient in September 2020.  He was doing relatively well at that time and tolerating them change in group homes.  We continued his medication regime.       HPI:  Patient presents today for the purposes of medication management.  Staff report: Pt has been going to speech therapy which has been helping with communication.  He has also been in better moods and able to get more attention where he is living with his parents now and has a caregiver as well.  No complaints at this time.  Pt has been pretty stable according to father.     I interviewed the patient as well as the patient's father by phone today.  Both reported the patient was doing fairly well.  The patient continues to have some mild clonus and when he has those episodes his behaviors tend to be a bit worse.  The family has not been using the as needed Acacian for that and I  suggested they try that and let us know whether it is helpful.    Patient's mood is been relatively good although he does have periods of irritability frustration and he can become rigid in his demands but they are used to that and working through it.  There is been no significant change in sleep.  There is been no hallucinations or delusions.  No suicidal thoughts or behaviors.  There is been no new medical issues or concerns.  The family would like to continue with the current medication regime at this time and they offer no other questions or concerns.      We discussed some treatment options and have elected to continue with the medication..      Current Medications: Please see chart. Medications personally reviewed.     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  No family history on file.  Current Outpatient Medications   Medication Sig Dispense Refill     acetaminophen (TYLENOL) 500 MG tablet Take 500 mg by mouth daily as needed for pain.       ascorbic acid (VITAMIN C) 250 MG tablet Take 250 mg by mouth daily.       cholecalciferol, vitamin D3, (VITAMIN D3) 4,000 unit cap Take 1 tablet by mouth daily.       clonazePAM (KLONOPIN) 1 MG tablet 1 mg by Gastrostomy Tube route 4 (four) times a day.       desvenlafaxine succinate (PRISTIQ) 100 MG 24 hr tablet TAKE ONE TABLET BY MOUTH ONE TIME DAILY  30 tablet 0     docusate sodium (COLACE) 100 MG capsule Take 200 mg by mouth bedtime.       hydrocortisone 1 % cream Apply topically 2 (two) times a day.       ibuprofen (ADVIL,MOTRIN) 200 MG tablet Take 200 mg by mouth every 6 (six) hours as needed for pain.        lamoTRIgine (LAMICTAL) 150 MG tablet Take 300 mg by mouth 2 (two) times a day.        loperamide (IMODIUM) 2 mg capsule Take 2 mg by mouth 4 (four) times a day as needed for diarrhea.       LORAZEPAM INTENSOL ORAL Take by mouth as needed (for seizures).       multivitamin with minerals tablet Take 1 tablet by mouth daily.       omeprazole (PRILOSEC) 20 MG capsule Take 20 mg by mouth daily.       polyethylene glycol (MIRALAX) 17 gram packet 17 g by Gastrostomy Tube route as needed.       QUEtiapine (SEROQUEL XR) 50 mg Tb24 24 hr tablet TAKE 1 TABLET BY MOUTH AT BEDTIME 31 each 3     QUEtiapine (SEROQUEL XR) 50 mg Tb24 24 hr tablet TAKE 1 TABLET BY MOUTH AT BEDTIME 31 each 3     QUEtiapine (SEROQUEL) 25 MG tablet TAKE 1/2 TABLET (12.5MG) BY MOUTH EVERY MORNING TAKE WITH THE FIRST MEAL OF THE DAY 15 tablet 3     QUEtiapine (SEROQUEL) 25 MG tablet Take 1-2 tablets (25-50 mg total) by mouth 3 (three) times a day as needed. 90 tablet 2     sennosides (SENNOSIDES) 8.8 mg/5 mL Syrp syrup 8.8 mg by Gastrostomy Tube route daily as needed.       TiZANidine (ZANAFLEX) 4 MG capsule Take 4 mg by mouth daily.        No current facility-administered medications for this visit.        Allergies   Allergen Reactions     Aloe      Hydrocodone      Social History     Socioeconomic History     Marital status: Single     Spouse name: Not on file     Number of children: Not on file     Years of education: Not on file     Highest education level: Not on file   Occupational History     Not on file   Social Needs     Financial resource strain: Not on file     Food insecurity     Worry: Not on file     Inability: Not on file     Transportation needs     Medical: Not on file     Non-medical: Not on file   Tobacco Use     Smoking status: Not on file   Substance and Sexual Activity     Alcohol use: Not on file     Drug use: Not on file     Sexual activity: Not on file   Lifestyle     Physical activity     Days per week: Not on file      Minutes per session: Not on file     Stress: Not on file   Relationships     Social connections     Talks on phone: Not on file     Gets together: Not on file     Attends Jewish service: Not on file     Active member of club or organization: Not on file     Attends meetings of clubs or organizations: Not on file     Relationship status: Not on file     Intimate partner violence     Fear of current or ex partner: Not on file     Emotionally abused: Not on file     Physically abused: Not on file     Forced sexual activity: Not on file   Other Topics Concern     Not on file   Social History Narrative     Not on file       The following portions of the patient's history were reviewed and updated as appropriate: allergies, current medications, past family history, past medical history, past social history, past surgical history and problem list.    Review of Systems  A comprehensive review of systems was negative except for what is noted above    Mental Status Exam:     Appearance: .  Patient was alert calm and smiling throughout the interview.  He was distractible but appears at baseline.  Behavior:   .  Patient has occasional periods where he is frustrated and is noncompliant but for the most part he participates.  He has been engaged in more physical activity and exercise.  Speech: .  He communicates mostly by shaking and nodding his head.  Answers are consistent but vague.  Mood/Affect:   .. No obvious anxiety depression or lability at this time  Thought Content: .  No hallucinations or delusions   Suicidal or Homicidal Thoughts:   .  None apparent or reported  Thought Process/Formulation:  .  Baseline slow and concrete.  No recent changes.  Associations: .  Baseline impaired  Fund of Knowledge:  .  Baseline impaired  Attention/Concentration: .  Today he is fairly attentive and tracking fairly well.  Concentration remains baseline impaired.  Insight: .  Baseline impaired  Judgement:  .  Baseline impaired  Memory:    . . Baseline impaired and difficult to assess due to the low level of participation.   Motor Status:  .  Occasional myoclonus.  This is baseline.  No new asymmetries.  Orientation: .  Baseline impaired     Diagnosis managed and treated at today's visit :    Major neurocognitive disorder with resultant mood and behavioral disturbance as well as cognitive deficits secondary to prior TBI in or about 2007     Rule out superimposed major depressive disorder, chronic, moderate, without psychotic features     Plan:  Medication Adjustment:  We will continue with the current medications    Other:   Patient will return to clinic in  3 months. They agree to call or return sooner with any questions or concerns.  Risks and benefits were discussed.  Continue with his individual therapist.     Continue with the support of the clinic, reassurance, and redirection. Staff monitoring and ongoing assessments per team plan. Current psychotropic medication appears to represent the minimum effective dosage and appears medically necessary. We will continue to monitor and reassess. This team will utilize appropriate emergency services if necessary. I will make myself available if concerns or problems arise.    Total time spent with the patient today was 22 minutes with greater than 50% of the time spent in counseling and care coordination. The patient agrees to call before then with any questions, concerns or problems. We will assess for the appropriateness of possible psychotropic medication trials/changes. The patient will seek out appropriate emergency services should that become necessary.    Video Visit Details    Type of service: Video Visit    Video Start Time: 11:15 AM    Video End Time: 11:30 AM    Total time for video call: 22 minutes    Originating Location: Patient's home    Distant Location:  [facility]    Mode of Communication: Video call via [employer]      Total time for patient and family  interview, records review and documentation is 22 minutes    Patient Instructions   It was nice speaking with you today for our office video visit. The following is a summary of our visit.    General Information:    If lab work was done today as part of your evaluation you will generally be contacted via My Chart, mail, or phone with the results within 1-5 days. If there is an alarming result we will contact you by phone. Lab results come back at varying times, I generally wait until all labs are resulted before making comments on results. Please note labs are automatically released to My Chart once available.     If you need refills please contact your pharmacist. They will send a refill request to me to review. Please allow 3 business days for us to process all refill requests.     Please call or send a medical message through My Chart, with any questions or concerns    If you need any paperwork completed please fax forms to [phone]. Please state if you would like a copy of the completed paperwork, mailed or faxed back to the patient and a fax number to fax the paperwork to. Please allow up to 10 days for paperwork to be completed.    Robin Parker MD

## 2021-06-16 NOTE — PROGRESS NOTES
Patient's impression of how medication is working? Patient depressed    Compliant with Medication? yes    Side Effects: None    Current Symptoms : Yes    Any Concerns? Angry about people correcting him while eating, choking at times    Pain (0-10) No  Appetite change No  Sleep disturbance No  Change in energy No  Change in interest No  Change in concentration No  Psychosis/Hallucinations No  Negative thoughts Yes  Mood swings Yes  Alcohol use No  Drug use No  Anxiety none  Sad/depressed mood moderate

## 2021-06-16 NOTE — PROGRESS NOTES
.  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  More depressed  Compliant with medication?  Yes    Side effects:None       Plan:     Reviewed medications and discuss options.  We do not have a complete list of his medications so the group home staff will fax that to us.  Discussed changing the Seroquel at bedtime to Exar so would gives patient a more steady state.  At this time patient wants to consider it and discuss it with his team.  We will have patient follow-up in 3 months    Subjective:      HPI: Robin Blum is a 32 y.o. male with  Dementia with behavioral disturbance and mood disorder secondary to a TBI.  He is here for follow up medication evaluation accompanied by his staff and his father.  Robin expresses that he is more depressed.  He has been having some choking episodes that seem to be self-induced.  Father states he has control with that.  He will put too much in his mouth chewing appropriately that can cause choking hazards.  He actually ended up in the hospital because of an episode and required CPR.  He denies feeling suicidal but is not happy with his life.  His father states he has a lot going for him.  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  Patient became upset today when his father was discussing the current situation as behaviors but he was easily calm down.  At this point patient expressed that he did not want to make a medication change.    Robin  has a past medical history of Dysphagia and Head injury.  Robin  has no past surgical history on file.  Aloe and Hydrocodone  Current Outpatient Prescriptions   Medication Sig Dispense Refill     acetaminophen (TYLENOL) 500 MG tablet Take 500 mg by mouth daily as needed for pain.        ascorbic acid (VITAMIN C) 250 MG tablet Take 250 mg by mouth daily.       cholecalciferol, vitamin D3, (VITAMIN D3) 4,000 unit cap Take 1 tablet by mouth daily.       clonazePAM (KLONOPIN) 1 MG tablet 1 mg by Gastrostomy Tube route 4 (four) times a day.       desvenlafaxine succinate (PRISTIQ) 100 MG 24 hr tablet Take 100 mg by mouth daily.       docusate sodium (COLACE) 100 MG capsule Take 200 mg by mouth bedtime.       hydrocortisone 1 % cream Apply topically 2 (two) times a day.       ibuprofen (ADVIL,MOTRIN) 200 MG tablet Take 200 mg by mouth every 6 (six) hours as needed for pain.       lamoTRIgine (LAMICTAL) 150 MG tablet Take 300 mg by mouth 2 (two) times a day.        loperamide (IMODIUM) 2 mg capsule Take 2 mg by mouth 4 (four) times a day as needed for diarrhea.       LORAZEPAM INTENSOL ORAL Take by mouth as needed (for seizures).       multivitamin with minerals tablet Take 1 tablet by mouth daily.       omeprazole (PRILOSEC) 20 MG capsule Take 20 mg by mouth daily.       polyethylene glycol (MIRALAX) 17 gram packet 17 g by Gastrostomy Tube route as needed.       QUEtiapine (SEROQUEL) 25 MG tablet Take 0.5 tablets (12.5 mg total) by mouth every morning. Take 0.5 tablet with his first meal of the day. 15 tablet 3     QUEtiapine (SEROQUEL) 25 MG tablet Take 1 tablet (25 mg total) by mouth 3 (three) times a day as needed. 90 tablet 2     QUEtiapine (SEROQUEL) 50 MG tablet Take 1 tablet (50 mg total) by mouth bedtime. 30 tablet 6     sennosides (SENNOSIDES) 8.8 mg/5 mL Syrp syrup 8.8 mg by Gastrostomy Tube route daily as needed.       TiZANidine (ZANAFLEX) 4 MG capsule Take 4 mg by mouth 2 times a day at 6:00 am and 4:00 pm.       No current facility-administered medications for this encounter.      No family history on file.  Social History     Social History     Marital status: Single     Spouse name: N/A     Number of children: N/A     Years of education: N/A     Social History Main Topics     Smoking  status: Not on file     Smokeless tobacco: Not on file     Alcohol use Not on file     Drug use: Not on file     Sexual activity: Not on file     Other Topics Concern     Not on file     Social History Narrative     No narrative on file       The following portions of the patient's history were reviewed and updated as appropriate: allergies, current medications, past family history, past medical history, past social history, past surgical history and problem list.    Review of Systems  Constitutional: negative  Neurological: negative for seizures  Behavioral/Psych: positive for aggressive behavior, behavior problems, depression, fatigue and mood swings, negative for loss of interest in favorite activities and sleep disturbance       Objective:   @VS    Mental Status Exam:     Appearance: Casually dressed, pleasant and cooperative.  Good eye contact smiles appropriately.  He does have a couple of outbursts towards his father and staff but is redirectable.    Speech / Language : Aphasic and Aggressive    Associations: appropriate    Alert and oriented X 3:self and place    Mood: Euthymic  Affect: Congruent w/content of speech    Suicidal / Homicidal ideation:none  If yes, document safety plan:    Fund of knowledge: good    Thought process:Within normal    Judgement / insight: Impairment    Recent and remote memory: Baseline impaired.    Attention: Within normal  Concentration: Within normal    Motor status: Wheelchair-bound, no involuntary movements.    Scores: NA    Change in medication? No    Education    Reviewed risks/benefits of medication      Physical Exam: There were no vitals taken for this visit.  Patient is alert and oriented.  He stated but redirectable.  Attempted to leave the room ×1 but was redirectable.

## 2021-06-18 NOTE — PROGRESS NOTES
".  Assessment / Impression     Dementia with behavioral disturbance secondary to TBI; increased behaviors.  Follow-up medication evaluation.  Seroquel at bedtime changed to extended release  Patient's impression of how medication is working?  Patient states good, staff feels he is more sedated  Compliant with medication? yes    Side effects:Drowsiness       Plan:     Reviewed medications, times, doses and as needed's.  The decision between staff and family is they would try the as needed Seroquel in the afternoon when he has his behaviors and document response.  We will have patient follow-up in 3 months, hopefully he will be settled into his new living situation.    Subjective:      HPI: Robin Blum is a 33 y.o. male with dementia with behavioral disturbance secondary to a TBI.  He is here for follow-up medication evaluation along with his staff and his parents.  At last visit, we changed his immediate release Seroquel at bedtime to extended release to see if that would give him better coverage during the day.  Staff and family noticed that he is having \"a more shorter fuse\".  They had to call 911 on him 2 weeks ago because of extensive behaviors and they could not get him to calm down.  Family and staff do believe it is due to the impending group home change.  He is going to be moving to another home and he is obsessing about that.  He also trashed another cell phone and does not have access to that at the immediate moment.  Patient does indicate he wants to leave the medications as is at this time.  Parents also report he had another extensive choking episode and they are worried he might have had some more damage to his brain after that incident.    Robin  has a past medical history of Dysphagia and Head injury.  Robin  has no past surgical history on file.  Aloe and Hydrocodone  Current Outpatient Prescriptions   Medication Sig Dispense Refill     acetaminophen (TYLENOL) 500 MG tablet Take 500 mg by mouth " daily as needed for pain.       ascorbic acid (VITAMIN C) 250 MG tablet Take 250 mg by mouth daily.       cholecalciferol, vitamin D3, (VITAMIN D3) 4,000 unit cap Take 1 tablet by mouth daily.       clonazePAM (KLONOPIN) 1 MG tablet 1 mg by Gastrostomy Tube route 4 (four) times a day.       desvenlafaxine succinate (PRISTIQ) 100 MG 24 hr tablet Take 100 mg by mouth daily.       docusate sodium (COLACE) 100 MG capsule Take 200 mg by mouth bedtime.       hydrocortisone 1 % cream Apply topically 2 (two) times a day.       ibuprofen (ADVIL,MOTRIN) 200 MG tablet Take 200 mg by mouth every 6 (six) hours as needed for pain.       lamoTRIgine (LAMICTAL) 150 MG tablet Take 300 mg by mouth 2 (two) times a day.        loperamide (IMODIUM) 2 mg capsule Take 2 mg by mouth 4 (four) times a day as needed for diarrhea.       LORAZEPAM INTENSOL ORAL Take by mouth as needed (for seizures).       multivitamin with minerals tablet Take 1 tablet by mouth daily.       omeprazole (PRILOSEC) 20 MG capsule Take 20 mg by mouth daily.       polyethylene glycol (MIRALAX) 17 gram packet 17 g by Gastrostomy Tube route as needed.       QUEtiapine (SEROQUEL) 25 MG tablet Take 1 tablet (25 mg total) by mouth 3 (three) times a day as needed. 90 tablet 2     QUEtiapine (SEROQUEL) 25 MG tablet Take 0.5 tablets (12.5 mg total) by mouth every morning. Take 0.5 tablet with his first meal of the day. 15 tablet 3     QUEtiapine XR (SEROQUEL XR) 50 mg Tb24 Take 1 tablet (50 mg total) by mouth at bedtime. 30 tablet 6     sennosides (SENNOSIDES) 8.8 mg/5 mL Syrp syrup 8.8 mg by Gastrostomy Tube route daily as needed.       TiZANidine (ZANAFLEX) 4 MG capsule Take 4 mg by mouth daily.        No current facility-administered medications for this encounter.      No family history on file.  Social History     Social History     Marital status: Single     Spouse name: N/A     Number of children: N/A     Years of education: N/A     Social History Main Topics      Smoking status: Not on file     Smokeless tobacco: Not on file     Alcohol use Not on file     Drug use: Not on file     Sexual activity: Not on file     Other Topics Concern     Not on file     Social History Narrative     No narrative on file       The following portions of the patient's history were reviewed and updated as appropriate: allergies, current medications, past family history, past medical history, past social history, past surgical history and problem list.    Review of Systems  Constitutional: negative  Gastrointestinal: positive for dysphagia  Neurological: positive for myoclonus  Behavioral/Psych: positive for aggressive behavior, behavior problems, fatigue and irritability       Objective:   @VS    Mental Status Exam:     Appearance: Patient is casually dressed, initially very irritable and volatile but redirectable.  Once he got his point across he was much calmer.  He does use his iPad to answer yes and no questions.    Speech / Language : Aphasic    Associations: appropriate    Alert and oriented X 3:self and place    Mood: Irritable  Affect: Congruent w/content of speech    Suicidal / Homicidal ideation:NA  If yes, document safety plan:    Fund of knowledge: fair    Thought process:Within normal    Judgement / insight: Impairment    Recent and remote memory: Baseline impaired.    Attention: Within normal  Concentration: Within normal    Motor status: Wheelchair-bound, he is independent with use of the iPad.    Scores: NA    Change in medication? No    Education    Reviewed risks/benefits of medication      Physical Exam: General appearance: alert, appears stated age, cooperative and mild distress  Neurologic: Mental status: alertness: alert, orientation: person, place, affect: mood-congruent, thought content exhibits logical connections

## 2021-06-18 NOTE — PROGRESS NOTES
"Pt here for medication management f/u apt.  Pt was very aggravated during the time I was asking him questions talking about \"justice\" and pounding his hand on the tables.   He hasn't been getting good sleep since starting Seroquel XR and seems drowsy throughout the next day  "

## 2021-06-20 NOTE — PROGRESS NOTES
Patient's impression of how medication is working? The patient states his medications have been working well for him. He is repeating himself more often.    Compliant with Medication? None    Side Effects: None    Current Symptoms : No    Pain (0-10) No  Appetite change No  Sleep disturbance No  Change in energy no  Change in interest no  Change in concentration Yes  Psychosis/Hallucinations No  Negative thoughts Yes  Mood swings No  Alcohol use No  Drug use No  Anxiety none  Sad/depressed mood none

## 2021-06-20 NOTE — PROGRESS NOTES
Outpatient Followup Psychiatric Evaluation      Pertinent History: The clinic relationship.  I last saw him in April 2016.  The patient has a history of significant cognitive mood and behavioral difficulties related to a prior traumatic brain injury.  After my April 2016 visit he had been followed by the nurse practitioner but reestablish contact with my clinic in October 2018.    Patient last saw the nurse practitioner and June 2018.  The patient was a bit more sedated at that time following a recent change in his Seroquel to extended release due to some increased agitation.  At that visit low-dose daytime Seroquel was added.    Patient is followed by therapist Ivet Rizo and last saw the therapist September 2018.  He recently moved to a new home and seemed to like that.  He seemed to be doing fairly well at that time.    Current Symptoms: She was unable to provide much history but consistently gave me a thumbs up when I asked him how things were doing.  The father was present as well as his one-to-one and the .  All report the patient is doing extremely well.  The last increase in Seroquel diminished many aggressive and frustrating behaviors that patient was having.  They report he is quite manageable at this time seems calm and happy.  No change in cognition.  He is engaged in a lot of activities and community outings and those are going well.  No change in sleep or appetite.  No suicidality.  No recent aggressive behaviors.  No psychosis.  No suicidality.  No new medical issues and no side effects to the medication.  No evidence of any tardive dyskinesia on exam today.  All present are requesting no medication changes at this time.    I did review records from the group home and filled out their paperwork      Current Medications:  Current medications were reviewed.  Please see the chart for additional information.    Medication Compliance: Yes    Side Effects to Medications: No      Vitals:  Wt  Readings from Last 3 Encounters:   No data found for Wt     Temp Readings from Last 3 Encounters:   No data found for Temp     BP Readings from Last 3 Encounters:   No data found for BP     Pulse Readings from Last 3 Encounters:   No data found for Pulse       Problem List (Please see medical records):  Active Problems:    * No active hospital problems. *        Mental Status Exam:   Appearance:  The patient was alert, comfortable and calm. No agitation. Not currently in any pain. No evidence of any shortness of breath.  He is in a wheelchair.  Sporadic eye contact but frequently smiling.  Behavior:  The patient is calm, cooperative, with no agitation or obvious distress. The patient does participate by giving me thumbs up typically.  Limited initiation. No restlessness. No reports of any recent behavioral dyscontrol.  Speech: No attempts to verbalize.  Communication is limited and as described above.  Mood/Affect:   Smiling.  Bright.  Alert.  No obvious depression or anxiety. No irritability. No lability.  Thought Content:  No evidence of any psychosis. No reports of any recent psychosis.  Suicidal or Homicidal Thoughts:  None apparent or reported. The patient denies any suicidal or homicidal ideation.   Thought Process/Formulation:  Able to track and follow some simple conversation..  The patient does need prompts and structure.  No racing thoughts.  Somewhat concrete.  Vague.  Associations:  Fair.  No recent change.  Able to process information.  Fund of Knowledge: Impaired.  No reports of any significant recent change.   Attention/Concentration: Needs prompts to attend. Concentration continues impaired.  Slow.  Somewhat concrete.  Not disorganized.  Insight:  Grossly unchanged.  Continues impaired  Judgement:  Grossly unchanged.  Continues impaired  Memory:  Grossly fair.  Needing prompts and structure.    Motor Status:  No recent change reported. No current tremor.   Orientation: No reports of any recent  change.    Diagnosis managed and treated at today's visit :    Major neurocognitive disorder with resultant mood and behavioral disturbance as well as cognitive deficits secondary to prior TBI in or about 2007    Rule out superimposed major depressive disorder, chronic, moderate, without psychotic features    Plan:  Medication Adjustment:  At this time we will make no medication changes    Other:   Patient will return to this clinic in 3-4 months for medication check.  Agreed to call or return sooner with any questions, concerns or problems.    Continue with the support of the clinic, reassurance, and redirection. Staff monitoring and ongoing assessments per team plan. Current psychotropic medication appears to represent the minimum effective dosage and appears medically necessary. We will continue to monitor and reassess. This team will utilize appropriate emergency services if necessary. I will make myself available if concerns or problems arise.    Robin Parker MD

## 2021-06-23 NOTE — PROGRESS NOTES
Outpatient Followup Psychiatric Evaluation      Pertinent History: The clinic relationship.  I last saw him in April 2016.  The patient has a history of significant cognitive mood and behavioral difficulties related to a prior traumatic brain injury.  After my April 2016 visit he had been followed by the nurse practitioner but reestablish contact with my clinic in October 2018.    Patient last saw the nurse practitioner and June 2018.  The patient was a bit more sedated at that time following a recent change in his Seroquel to extended release due to some increased agitation.  At that visit low-dose daytime Seroquel was added.    Patient is followed by therapist Ivet Rizo and last saw the therapist September 2018.  He moved to a new group home in 2018 and seemed to like that.  He seemed to be doing fairly well at that time.    I saw the patient in October 2018.  The patient was doing relatively well at that time and I did not make any psychotropic medication changes.    Current Symptoms:   The patient was communicating a bit more unit using gestures.  He reports he is frustrated by his current circumstance.  He apparently had some type of conflict with 1 of his caregivers this morning.  There were 2 staff member's present and the patient's father.  They were aware that over the last couple of weeks the patient has expressed frustration about his current living arrangements.  This is occurred while there has been much turn over and limited staff.  The patient has felt as if he has been neglected.  They are in the process of rectifying that and hiring new staff.  The patient's father reports that overall the care is been quite good and he does not want to look at another facility at this time.  They are in the process of again revisiting augmented communication devices.  The patient seems more willing and eager to participate in this and is made more attempts to communicate.  Continues to perseverate on using his  iPad and we spent quite a bit of time talking about strategies to try and alleviate some of this.  He will be seeing his psychologist later today and they will continue with that discussion.  I suggested having the device locked to certain times of day and certain types of activities recognizing that he would probably have an increase in behaviors during that transition but trying to fill the extra time with activities that the patient might find enjoyable and might be more appropriate socially for him.  Patient is sleeping well and eating well.  They believe that he is communicating and improving slightly cognitively.  There is been no psychosis.  No suicidality.  No new medical issues.  The patient is tolerating the medication with no side effects.  We did discuss the fact that the patient had obsessive tendencies prior to the brain injury.  Apparently the father struggles with OCD.  We did discuss the possibility of a medication adjustment in the future to address this but I would like the behavior plan in place to coincide with any medication changes.    I did review records from the group home and filled out their paperwork.  I did spend 35 minutes with the patient today.      Current Medications:  Current medications were reviewed.  Please see the chart for additional information.    Medication Compliance: Yes    Side Effects to Medications: No      Vitals:  Wt Readings from Last 3 Encounters:   No data found for Wt     Temp Readings from Last 3 Encounters:   No data found for Temp     BP Readings from Last 3 Encounters:   No data found for BP     Pulse Readings from Last 3 Encounters:   No data found for Pulse       Problem List (Please see medical records):  Active Problems:    * No active hospital problems. *        Mental Status Exam:   Appearance: Patient presents appearing relatively comfortable.  No obvious distress.  Not short of breath.  No obvious pain.  Able to smile and bright.  Behavior: Patient  maintains good behavioral control.  He is currently not restless or agitated.  Speech: He indicates through gestures.  At times difficult to understand.  He does initiate however.  Occasionally giving me thumbs up or down for yes no responses.  Mood/Affect: Not significantly depressed or anxious.  He is smiling.  No lability or agitation.  Thought Content: No reports of any recent psychosis.  No evidence of any psychosis.  Suicidal or Homicidal Thoughts:  None apparent or reported. The patient denies any suicidal or homicidal ideation.   Thought Process/Formulation: Slow.  Somewhat concrete and vague.  He is able to track and follow some conversation.  No racing thoughts.  Associations: No obvious recent change.  Somewhat concrete.  Able to process some simple information.  Fund of Knowledge: No significant recent change.  He continues somewhat impaired.  Attention/Concentration: Slow.  Somewhat concrete.  Still with impaired concentration.  Insight:  Grossly unchanged.  Continues impaired  Judgement:  Grossly unchanged.  Continues impaired  Memory:  Grossly fair.  Needing prompts and structure.    Motor Status:  No recent change reported. No current tremor.   Orientation: No reports of any recent change.    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    Other:   Patient will return to this clinic in 6 months for medication check.  Agreed to call or return sooner with any questions, concerns or problems.    Continue with the support of the clinic, reassurance, and redirection. Staff monitoring and ongoing assessments per team plan. Current psychotropic medication appears to represent  the minimum effective dosage and appears medically necessary. We will continue to monitor and reassess. This team will utilize appropriate emergency services if necessary. I will make myself available if concerns or problems arise.    Robin Parker MD

## 2021-06-26 NOTE — PROGRESS NOTES
"Video Visit  Robin Blum is a 36 y.o. male who is being evaluated via a billable video visit in light of the ongoing global health crisis (COVID-19) that requires us to abide by social distancing mandates in order to reduce the risk of COVID-19 exposure.       The patient has been notified of following:     \"This video visit will be conducted via a video call between you and your physician/provider. We have found that certain health care needs can be provided without the need for a physical exam.  This service lets us provide the care you need with a short phone/video conversation.  If a prescription is necessary we can send it directly to your pharmacy.  If lab work is needed we can place an order for that and you can then stop by our lab to have the test done at a later time.    If during the course of the call the physician/provider feels a video visit is not appropriate, you will not be charged for this service.\"     Patient has given verbal consent to a video visit? Yes    Consent was obtained for this service by one of our care team members    Psychology  Yes, comes to the home and has helped in a lot of improvements in behaviors.        Any new medication (other provider):   No   Meds started at last appointment  No  Meds increased at last appointment    No      Patient's impression of how medication is working? Pt has rough mornings here and there.  He has trouble with getting dressed.       Compliant with Medication? Yes      Side Effects: No   Pain (0-10) No   Appetite change No   Sleep disturbance No   Change in energy No   Change in interest No   Change in concentration No   Psychosis/Hallucinations No   Negative thoughts No   Mood swings Yes   Alcohol use No   Drug use No   Anxiety No   Sad/depressed mood No                                                        Phone Start Time: 9:50am    Phone End Time:  9:55am    Total time of phone conversation: 5 minutes    There were no vitals filed for this " visit.    Patient would like the video invitation sent by: [facility] [place]   Number/e-mail address:[phone]    Rosemarie Mcghee CMA    Outpatient Followup TBI Evaluation     Pertinent History:  The patient was referred to this clinic for further assessment and treatment .The patient has a history of significant cognitive mood and behavioral difficulties related to a prior traumatic brain injury.  After my April 2016 visit he had been followed by the nurse practitioner but reestablish contact with my clinic in October 2018.     Patient last saw the nurse practitioner and June 2018.  The patient was a bit more sedated at that time following a recent change in his Seroquel to extended release due to some increased agitation.  At that visit low-dose daytime Seroquel was added.     Patient is followed by therapist Ivet Rizo and last saw the therapist September 2018.  He moved to a new group home in 2018 and seemed to like that.  He seemed to be doing fairly well at that time.     I saw the patient in October 2018.  The patient was doing relatively well at that time and I did not make any psychotropic medication changes.     I saw the patient in January 2019.  At that time he was communicating a bit more and using gestures.  He continued to be frustrated with occasional conflict with caregivers.  We did not make any medication changes at that time.     I saw the patient in August 2019.  I met with the patient's mother and father for 20 minutes prior to that appointment.  They had some concerns about the patient's presentation and his cares.  They felt he was spending too much time on the iPad.  We did address this with the group home staff.  We made no medication changes at that time.  The patient moved back home with his mom and dad on October 30.  That went quite well for a week or so but then the patient began showing more behavioral issues.  Shortly before the November 2019 meeting I did increase the patient's PRN  Seroquel in the context of been increased behavioral issues and refusals of multiple medications.     I saw the patient in November 2019.  The patient had been at his current group home for about and was having some behaviors possibly related to adjustment.  The PRN Seroquel had been somewhat helpful.  There was questions as to whether the patient could benefit from a behavioral analyst and they were going to pursue that as well.      I saw the patient in November 2019. He was living in his parents house at that time. He was doing fairly well but had occasional outbursts. Oftentimes these were directed towards his father in frustration. They were starting with a behavioral analyst to begin working on those issues.     I saw the patient in September 2020.  He was doing relatively well at that time and tolerating them change in group homes.  We continued his medication regime.    The patient in March 2020.  He was doing fairly well at that time and was attending speech therapy which was reportedly helpful.  We elected to continue with his current treatment plan at that time and did not make any changes.     HPI:  Patient presents today for the purposes of medication management.   Interviewed the patient as well as his father today by video.  They reported he was making quite a bit of progress in his daily activities.  Physically he was stronger and now dressing himself he was gaining some more independence.  He also has been a bit more interactive.  The patient's father was quite pleased by this but stated along with this the patient tends to target the father and can become upset and refuse cares at certain times.    The patient is working with his behavioral analyst and therapist Dr. Yasemin Leo and that is going extremely well and they are trying various techniques to try and decrease the patient's outbursts.  Those outbursts according to the father tend to be directed towards his father.  He can be combative  at times but overall he is doing better.  The patient was a limited historian but denied having any significant depression.  Denied having any psychosis.  No side effects to the medication and no new medical issues or concerns.  All present thought we should continue with the current medications and treatment plan.     We discussed some treatment options and have elected to continue with the current medications..      Current Medications: Please see chart. Medications personally reviewed.     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  No family history on file.  Current Outpatient Medications   Medication Sig Dispense Refill     acetaminophen (TYLENOL) 500 MG tablet Take 500 mg by mouth daily as needed for pain.       ascorbic acid (VITAMIN C) 250 MG tablet Take 250 mg by mouth daily.       cholecalciferol, vitamin D3, 50 mcg (2,000 unit) capsule Take 1 capsule (50 mcg) by mouth daily.       clonazePAM (KLONOPIN) 1 MG tablet 1 mg by Gastrostomy Tube route 4 (four) times a day.       desvenlafaxine succinate (PRISTIQ) 100 MG 24 hr tablet TAKE ONE TABLET BY MOUTH ONE TIME DAILY  30 tablet 0     docusate sodium (COLACE) 100 MG capsule TAKE TWO CAPSULES BY MOUTH DAILY AT BEDTIME AS NEEDED FOR CONSTIPATION       FIBER- mg tablet Take 1 tablet by mouth daily.       FLEET GLYCERIN, ADULT, Supp rectal suppository Insert 1 Suppository rectally every 12 hours as needed for Constipation.       hydrocortisone 1 % cream Apply topically 2 (two) times a day.       ibuprofen (ADVIL,MOTRIN) 200 MG tablet Take 200 mg by mouth every 6 (six) hours as needed for  pain.       lamoTRIgine (LAMICTAL) 150 MG tablet Take 300 mg by mouth 2 (two) times a day.        loperamide (IMODIUM) 2 mg capsule Take 2 mg by mouth 4 (four) times a day as needed for diarrhea.       LORAZEPAM INTENSOL ORAL Take by mouth as needed (for seizures).       multivitamin with minerals tablet Take 1 tablet by mouth daily.       omeprazole (PRILOSEC) 20 MG capsule Take 20 mg by mouth daily.       polyethylene glycol (MIRALAX) 17 gram packet 17 g by Gastrostomy Tube route as needed.       QUEtiapine (SEROQUEL XR) 50 mg Tb24 24 hr tablet TAKE 1 TABLET BY MOUTH AT BEDTIME 31 each 3     QUEtiapine (SEROQUEL XR) 50 mg Tb24 24 hr tablet TAKE 1 TABLET BY MOUTH AT BEDTIME 31 each 3     QUEtiapine (SEROQUEL) 25 MG tablet TAKE 1/2 TABLET (12.5MG) BY MOUTH EVERY MORNING TAKE WITH THE FIRST MEAL OF THE DAY 15 tablet 3     QUEtiapine (SEROQUEL) 25 MG tablet Take 1-2 tablets (25-50 mg total) by mouth 3 (three) times a day as needed. 90 tablet 2     SENNA-S 8.6-50 mg tablet Take 1 tablet by mouth 2 (two) times a day as needed.       sennosides (SENNOSIDES) 8.8 mg/5 mL Syrp syrup 8.8 mg by Gastrostomy Tube route daily as needed.       TiZANidine (ZANAFLEX) 4 MG capsule Take 4 mg by mouth daily.        No current facility-administered medications for this visit.        Allergies   Allergen Reactions     Acetaminophen Other (See Comments)     Not specified     Aloe      Divalproex Sodium Other (See Comments)     Intolerance       Hydrocodone      Hydrocodone-Acetaminophen Unknown     Levetiracetam      Valproic Acid      Social History     Socioeconomic History     Marital status: Single     Spouse name: Not on file     Number of children: Not on file     Years of education: Not on file     Highest education level: Not on file   Occupational History     Not on file   Social Needs     Financial resource strain: Not on file     Food insecurity     Worry: Not on file     Inability: Not on file     Transportation needs      Medical: Not on file     Non-medical: Not on file   Tobacco Use     Smoking status: Not on file   Substance and Sexual Activity     Alcohol use: Not on file     Drug use: Not on file     Sexual activity: Not on file   Lifestyle     Physical activity     Days per week: Not on file     Minutes per session: Not on file     Stress: Not on file   Relationships     Social connections     Talks on phone: Not on file     Gets together: Not on file     Attends Religion service: Not on file     Active member of club or organization: Not on file     Attends meetings of clubs or organizations: Not on file     Relationship status: Not on file     Intimate partner violence     Fear of current or ex partner: Not on file     Emotionally abused: Not on file     Physically abused: Not on file     Forced sexual activity: Not on file   Other Topics Concern     Not on file   Social History Narrative     Not on file       The following portions of the patient's history were reviewed and updated as appropriate: allergies, current medications, past family history, past medical history, past social history, past surgical history and problem list.    Review of Systems  A comprehensive review of systems was negative except for what is noted above    Mental Status Exam:     Appearance: .  Patient was alert calm during the interview.  He was able to maintain eye contact.  Behavior:   .  Patient has periodic outbursts but these tend to be directed towards his father and in an attempt to control or manipulate his environment.  These are chronic issues that are being addressed through the behavior plan as well.  Speech: .  Patient was mute during my interview  Mood/Affect:   .. Mood is been a bit better has been a bit more alert and interactive but does have periods of getting easily frustrated.  Thought Content: .  No hallucinations or delusions   Suicidal or Homicidal Thoughts:   .  None apparent or reported  Thought Process/Formulation:  .   Baseline impaired  Associations: .  Baseline impaired  Fund of Knowledge:  .  Baseline impaired  Attention/Concentration: .  Can be distractible and perseverative at times.  He was fairly attentive today.  Baseline impaired concentration however.  Insight: .  Baseline impaired  Judgement:  .  Baseline impaired  Memory:   . . Baseline impaired with limited participation   Motor Status:  .  No new tremors or asymmetries  Orientation: .  Baseline impaired     Diagnosis managed and treated at today's visit :    Major neurocognitive disorder with resultant mood and behavioral disturbance as well as cognitive deficits secondary to prior TBI in or about 2007     Rule out superimposed major depressive disorder, chronic, moderate, without psychotic features     Plan:  Medication Adjustment:  We will continue with the current medications    Other:   Patient will return to clinic in  3 months. They agree to call or return sooner with any questions or concerns.  Risks and benefits were discussed.  Continue with his individual therapist.     Continue with the support of the clinic, reassurance, and redirection. Staff monitoring and ongoing assessments per team plan. Current psychotropic medication appears to represent the minimum effective dosage and appears medically necessary. We will continue to monitor and reassess. This team will utilize appropriate emergency services if necessary. I will make myself available if concerns or problems arise.    Total time spent with the patient today was 22 minutes with greater than 50% of the time spent in counseling and care coordination. The patient agrees to call before then with any questions, concerns or problems. We will assess for the appropriateness of possible psychotropic medication trials/changes. The patient will seek out appropriate emergency services should that become necessary.    Video Visit Details    Type of service: Video Visit    Video Start Time: 10:10 AM    Video End  Time: 10:30 AM    Total time for video call: 20 minutes    Originating Location: Patient's home    Distant Location:  [facility]    Mode of Communication: Video call via [employer]      Total time for patient interview, interview with father, chart review and documentation was 22 minutes    Patient Instructions   It was nice speaking with you today for our office video visit. The following is a summary of our visit.    General Information:    If lab work was done today as part of your evaluation you will generally be contacted via My Chart, mail, or phone with the results within 1-5 days. If there is an alarming result we will contact you by phone. Lab results come back at varying times, I generally wait until all labs are resulted before making comments on results. Please note labs are automatically released to My Chart once available.     If you need refills please contact your pharmacist. They will send a refill request to me to review. Please allow 3 business days for us to process all refill requests.     Please call or send a medical message through My Chart, with any questions or concerns    If you need any paperwork completed please fax forms to [phone]. Please state if you would like a copy of the completed paperwork, mailed or faxed back to the patient and a fax number to fax the paperwork to. Please allow up to 10 days for paperwork to be completed.    Robin Parker MD

## 2021-07-03 NOTE — ADDENDUM NOTE
Addendum Note by Rosemarie Mcghee CMA at 10/2/2018 12:24 PM     Author: Rosemarie Mcghee CMA Service: -- Author Type: Certified Medical Assistant    Filed: 10/2/2018 12:24 PM Date of Service: 10/2/2018 12:24 PM Status: Signed    : Rosemarie Mcghee CMA (Certified Medical Assistant)    Encounter addended by: Rosemarie Mcghee CMA on: 10/2/2018 12:24 PM<BR>     Actions taken: Charge Capture section accepted

## 2021-07-03 NOTE — ADDENDUM NOTE
Addendum Note by Rosemarie Mcghee CMA at 8/1/2019 11:59 PM     Author: Rosemarie Mcghee CMA Service: -- Author Type: Certified Medical Assistant    Filed: 8/6/2019  9:57 AM Date of Service: 8/1/2019 11:59 PM Status: Signed    : Rosemarie Mcghee CMA (Certified Medical Assistant)    Encounter addended by: Rosemarie Mcghee CMA on: 8/6/2019  9:57 AM      Actions taken: Charge Capture section accepted

## 2021-07-03 NOTE — ADDENDUM NOTE
Addendum Note by Rosemarie Mcghee CMA at 9/12/2017  4:01 PM     Author: Rosemarie Mcghee CMA Service: -- Author Type: Certified Medical Assistant    Filed: 9/12/2017  4:01 PM Date of Service: 9/12/2017  4:01 PM Status: Signed    : Rosemarie Mcghee CMA (Certified Medical Assistant)    Encounter addended by: Rosemarie Mcghee CMA on: 9/12/2017  4:01 PM<BR>     Actions taken: Charge Capture section accepted

## 2021-07-03 NOTE — ADDENDUM NOTE
Addendum Note by Rosemarie Mcghee CMA at 6/14/2018 12:49 PM     Author: Rosemarie Mcghee CMA Service: -- Author Type: Certified Medical Assistant    Filed: 6/14/2018 12:49 PM Date of Service: 6/14/2018 12:49 PM Status: Signed    : Rosemarie Mcghee CMA (Certified Medical Assistant)    Encounter addended by: Rosemarie Mcghee CMA on: 6/14/2018 12:49 PM<BR>     Actions taken: Charge Capture section accepted

## 2021-07-03 NOTE — ADDENDUM NOTE
Addendum Note by Rosemarie Mcghee CMA at 3/13/2018 11:59 PM     Author: Rosemarie Mcghee CMA Service: -- Author Type: Certified Medical Assistant    Filed: 3/16/2018  8:56 AM Date of Service: 3/13/2018 11:59 PM Status: Signed    : Rosemarie Mcghee CMA (Certified Medical Assistant)    Encounter addended by: Rosemarie Mcghee CMA on: 3/16/2018  8:56 AM<BR>     Actions taken: Charge Capture section accepted

## 2021-07-03 NOTE — ADDENDUM NOTE
Addendum Note by Rosemarie Mcghee CMA at 1/31/2019 11:59 PM     Author: Rosemarie Mcghee CMA Service: -- Author Type: Certified Medical Assistant    Filed: 2/1/2019 10:52 AM Date of Service: 1/31/2019 11:59 PM Status: Signed    : Rosemarie Mcghee CMA (Certified Medical Assistant)    Encounter addended by: Rosemarie cMghee CMA on: 2/1/2019 10:52 AM      Actions taken: Charge Capture section accepted

## 2021-07-03 NOTE — ADDENDUM NOTE
Addendum Note by Rosemarie Mcghee CMA at 2/6/2020 10:30 AM     Author: Rosemarie Mcghee CMA Service: -- Author Type: Certified Medical Assistant    Filed: 2/7/2020  2:56 PM Date of Service: 2/6/2020 10:30 AM Status: Signed    : Rosemarie Mcghee CMA (Certified Medical Assistant)    Encounter addended by: Rosemarie Mcghee CMA on: 2/7/2020  2:56 PM      Actions taken: Charge Capture section accepted

## 2021-09-28 ENCOUNTER — VIRTUAL VISIT (OUTPATIENT)
Dept: NEUROLOGY | Facility: CLINIC | Age: 36
End: 2021-09-28
Payer: MEDICARE

## 2021-09-28 VITALS — WEIGHT: 185 LBS | BODY MASS INDEX: 29.03 KG/M2 | HEIGHT: 67 IN

## 2021-09-28 DIAGNOSIS — F06.30 MOOD DISORDER IN CONDITIONS CLASSIFIED ELSEWHERE: Primary | ICD-10-CM

## 2021-09-28 PROCEDURE — 99214 OFFICE O/P EST MOD 30 MIN: CPT | Mod: 95 | Performed by: PSYCHIATRY & NEUROLOGY

## 2021-09-28 RX ORDER — CLONAZEPAM 1 MG/1
TABLET ORAL
COMMUNITY
Start: 2021-09-24 | End: 2023-02-16

## 2021-09-28 RX ORDER — ACETAMINOPHEN 160 MG
2000 TABLET,DISINTEGRATING ORAL 2 TIMES DAILY
COMMUNITY
Start: 2021-09-17

## 2021-09-28 ASSESSMENT — MIFFLIN-ST. JEOR: SCORE: 1727.78

## 2021-09-28 NOTE — PROGRESS NOTES
Outpatient Followup TBI Evaluation     Pertinent History:  The patient was referred to this clinic for further assessment and treatment .The patient has a history of significant cognitive mood and behavioral difficulties related to a prior traumatic brain injury.  After my April 2016 visit he had been followed by the nurse practitioner but reestablish contact with my clinic in October 2018.     Patient last saw the nurse practitioner and June 2018.  The patient was a bit more sedated at that time following a recent change in his Seroquel to extended release due to some increased agitation.  At that visit low-dose daytime Seroquel was added.     Patient is followed by therapist Ivet Rizo and last saw the therapist September 2018.  He moved to a new group home in 2018 and seemed to like that.  He seemed to be doing fairly well at that time.     I saw the patient in October 2018.  The patient was doing relatively well at that time and I did not make any psychotropic medication changes.     I saw the patient in January 2019.  At that time he was communicating a bit more and using gestures.  He continued to be frustrated with occasional conflict with caregivers.  We did not make any medication changes at that time.     I saw the patient in August 2019.  I met with the patient's mother and father for 20 minutes prior to that appointment.  They had some concerns about the patient's presentation and his cares.  They felt he was spending too much time on the iPad.  We did address this with the group home staff.  We made no medication changes at that time.  The patient moved back home with his mom and dad on October 30.  That went quite well for a week or so but then the patient began showing more behavioral issues.  Shortly before the November 2019 meeting I did increase the patient's PRN Seroquel in the context of been increased behavioral issues and refusals of multiple medications.     I saw the patient in  November 2019.  The patient had been at his current group home for about and was having some behaviors possibly related to adjustment.  The PRN Seroquel had been somewhat helpful.  There was questions as to whether the patient could benefit from a behavioral analyst and they were going to pursue that as well.      I saw the patient in November 2019. He was living in his parents house at that time. He was doing fairly well but had occasional outbursts. Oftentimes these were directed towards his father in frustration. They were starting with a behavioral analyst to begin working on those issues.     I saw the patient in September 2020.  He was doing relatively well at that time and tolerating them change in group homes.  We continued his medication regime.    The patient in March 2020.  He was doing fairly well at that time and was attending speech therapy which was reportedly helpful.  We elected to continue with his current treatment plan at that time and did not make any changes.    Saw the patient in June 2021.  He continued to make progress in therapies.  He was becoming stronger and was dressing himself.  He still had periods where he was upset and refused cares.  He was working with the behavioral analyst and that seem to be helping.  We did not make any medication changes at that visit.     HPI:  Patient presents today for the purposes of medication management.   I had an opportunity to interview the patient and his father today.  The patient was not able participate much.  The father however reported the patient was doing relatively well and he felt that the behavioral analyst was helping quite a bit.  His primary care doctor continue to follow the patient and there were no new medical issues or concerns.  The prior visit we did change his Seroquel dosing to 12.5 mg in the morning and 15 mg at night and the patient seemed to be tolerating that but continued to be somewhat groggy and irritable in the mornings.   The mornings seem to be more difficult for the patient.  There was no significant change in cognition.  No hallucinations or delusions.  No suicidality.  He seemed to be tolerating the medication although was a bit sleepy at times.  All of this appeared to be baseline.  He was adjusting to being back at home and the family noticed that a solid routine was extremely helpful.    We discussed a variety of treatment options and decided to move his Pristiq to morning dosing.  We will consider a future increase in that medication but first we will see whether this helps with his a.m. irritability.     We discussed some treatment options and have elected to continue with the current medications..      Current Medications: Please see chart. Medications personally reviewed.     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  No family history on file.  Current Outpatient Medications   Medication Sig Dispense Refill     acetaminophen (TYLENOL) 500 MG tablet Take 500 mg by mouth daily as needed for pain.       ascorbic acid (VITAMIN C) 250 MG tablet Take 250 mg by mouth daily.       cholecalciferol, vitamin D3, 50 mcg (2,000 unit) capsule Take 1 capsule (50 mcg) by mouth daily.       clonazePAM (KLONOPIN) 1 MG tablet 1 mg by Gastrostomy Tube route 4 (four) times a day.       desvenlafaxine succinate (PRISTIQ) 100 MG 24 hr tablet TAKE ONE TABLET BY MOUTH ONE TIME DAILY  30 tablet 0     docusate sodium (COLACE) 100 MG capsule TAKE TWO CAPSULES BY MOUTH DAILY AT BEDTIME AS NEEDED FOR CONSTIPATION       FIBER- mg tablet Take 1 tablet by mouth daily.       FLEET  GLYCERIN, ADULT, Supp rectal suppository Insert 1 Suppository rectally every 12 hours as needed for Constipation.       hydrocortisone 1 % cream Apply topically 2 (two) times a day.       ibuprofen (ADVIL,MOTRIN) 200 MG tablet Take 200 mg by mouth every 6 (six) hours as needed for pain.       lamoTRIgine (LAMICTAL) 150 MG tablet Take 300 mg by mouth 2 (two) times a day.        loperamide (IMODIUM) 2 mg capsule Take 2 mg by mouth 4 (four) times a day as needed for diarrhea.       LORAZEPAM INTENSOL ORAL Take by mouth as needed (for seizures).       multivitamin with minerals tablet Take 1 tablet by mouth daily.       omeprazole (PRILOSEC) 20 MG capsule Take 20 mg by mouth daily.       polyethylene glycol (MIRALAX) 17 gram packet 17 g by Gastrostomy Tube route as needed.       QUEtiapine (SEROQUEL XR) 50 mg Tb24 24 hr tablet TAKE 1 TABLET BY MOUTH AT BEDTIME 31 each 3     QUEtiapine (SEROQUEL XR) 50 mg Tb24 24 hr tablet TAKE 1 TABLET BY MOUTH AT BEDTIME 31 each 3     QUEtiapine (SEROQUEL) 25 MG tablet TAKE 1/2 TABLET (12.5MG) BY MOUTH EVERY MORNING TAKE WITH THE FIRST MEAL OF THE DAY 15 tablet 3     QUEtiapine (SEROQUEL) 25 MG tablet Take 1-2 tablets (25-50 mg total) by mouth 3 (three) times a day as needed. 90 tablet 2     SENNA-S 8.6-50 mg tablet Take 1 tablet by mouth 2 (two) times a day as needed.       sennosides (SENNOSIDES) 8.8 mg/5 mL Syrp syrup 8.8 mg by Gastrostomy Tube route daily as needed.       TiZANidine (ZANAFLEX) 4 MG capsule Take 4 mg by mouth daily.        No current facility-administered medications for this visit.        Allergies   Allergen Reactions     Acetaminophen Other (See Comments)     Not specified     Aloe      Divalproex Sodium Other (See Comments)     Intolerance       Hydrocodone      Hydrocodone-Acetaminophen Unknown     Levetiracetam      Valproic Acid      Social History     Socioeconomic History     Marital status: Single     Spouse name: Not on file     Number of children: Not on  file     Years of education: Not on file     Highest education level: Not on file   Occupational History     Not on file   Social Needs     Financial resource strain: Not on file     Food insecurity     Worry: Not on file     Inability: Not on file     Transportation needs     Medical: Not on file     Non-medical: Not on file   Tobacco Use     Smoking status: Not on file   Substance and Sexual Activity     Alcohol use: Not on file     Drug use: Not on file     Sexual activity: Not on file   Lifestyle     Physical activity     Days per week: Not on file     Minutes per session: Not on file     Stress: Not on file   Relationships     Social connections     Talks on phone: Not on file     Gets together: Not on file     Attends Sikh service: Not on file     Active member of club or organization: Not on file     Attends meetings of clubs or organizations: Not on file     Relationship status: Not on file     Intimate partner violence     Fear of current or ex partner: Not on file     Emotionally abused: Not on file     Physically abused: Not on file     Forced sexual activity: Not on file   Other Topics Concern     Not on file   Social History Narrative     Not on file       The following portions of the patient's history were reviewed and updated as appropriate: allergies, current medications, past family history, past medical history, past social history, past surgical history and problem list.    Review of Systems  A comprehensive review of systems was negative except for what is noted above    Mental Status Exam:     Appearance: No significant change in the patient's presentation.  No new tremors.  Behavior:   The patient continues with occasional irritability.  This occurs most frequently in the morning.  No evidence of any dov.  Speech: The patient again did not participate during the interview with me.  Mood/Affect:   He continues with periodic frustration.  Often times a bit dismissive and  disinterested.  Thought Content: .  No hallucinations or delusions   Suicidal or Homicidal Thoughts:   .  None apparent or reported  Thought Process/Formulation:  .  Baseline impaired.  No reports of any recent change.  Associations: .  Baseline impaired  Fund of Knowledge:  .  Baseline impaired  Attention/Concentration: .  Baseline distractible and often disinterested.  No obvious racing thoughts.  Insight: .  Baseline impaired  Judgement:  .  Baseline impaired  Memory:   . . Baseline impaired with limited participation   Motor Status:  .  No new tremors or asymmetries  Orientation: .  Baseline impaired     Diagnosis managed and treated at today's visit :    Major neurocognitive disorder with resultant mood and behavioral disturbance as well as cognitive deficits secondary to prior TBI in or about 2007     Rule out superimposed major depressive disorder, chronic, moderate, without psychotic features     Plan:  Medication Adjustment:  I am going to move the patient's Pristiq to a.m. dosing.  At this point we will make no other medication changes although I am considering an increase in that medication in the future.    Other:   The patient will return to clinic in 2 to 3 weeks for medication check.     Continue with the support of the clinic, reassurance, and redirection. Staff monitoring and ongoing assessments per team plan. Current psychotropic medication appears to represent the minimum effective dosage and appears medically necessary. We will continue to monitor and reassess. This team will utilize appropriate emergency services if necessary. I will make myself available if concerns or problems arise.    Total time spent with the patient today was 25 minutes with greater than 50% of the time spent in counseling and care coordination. The patient agrees to call before then with any questions, concerns or problems. We will assess for the appropriateness of possible psychotropic medication trials/changes. The  patient will seek out appropriate emergency services should that become necessary.    Video Visit Details    Type of service: Video Visit    Video Start Time: 9:30 AM    Video End Time: 9:50 AM    Total time for video call: 20 minutes    Originating Location: Patient's home    Distant Location:  [facility]    Mode of Communication: Video call via [employer]      Total time for patient interview, interview with father, chart review and documentation was 25 minutes    Patient Instructions   It was nice speaking with you today for our office video visit. The following is a summary of our visit.    General Information:    If lab work was done today as part of your evaluation you will generally be contacted via My Chart, mail, or phone with the results within 1-5 days. If there is an alarming result we will contact you by phone. Lab results come back at varying times, I generally wait until all labs are resulted before making comments on results. Please note labs are automatically released to My Chart once available.     If you need refills please contact your pharmacist. They will send a refill request to me to review. Please allow 3 business days for us to process all refill requests.     Please call or send a medical message through My Chart, with any questions or concerns    If you need any paperwork completed please fax forms to [phone]. Please state if you would like a copy of the completed paperwork, mailed or faxed back to the patient and a fax number to fax the paperwork to. Please allow up to 10 days for paperwork to be completed.    Robin Parker MD

## 2021-09-28 NOTE — LETTER
9/28/2021         RE: Robin Blum  [id number] [facility] [id number]        Dear Colleague,    Thank you for referring your patient, Robin Blum, to the [facility]. Please see a copy of my visit note below.        Outpatient Followup TBI Evaluation     Pertinent History:  The patient was referred to this clinic for further assessment and treatment .The patient has a history of significant cognitive mood and behavioral difficulties related to a prior traumatic brain injury.  After my April 2016 visit he had been followed by the nurse practitioner but reestablish contact with my clinic in October 2018.     Patient last saw the nurse practitioner and June 2018.  The patient was a bit more sedated at that time following a recent change in his Seroquel to extended release due to some increased agitation.  At that visit low-dose daytime Seroquel was added.     Patient is followed by therapist Ivet Rizo and last saw the therapist September 2018.  He moved to a new group home in 2018 and seemed to like that.  He seemed to be doing fairly well at that time.     I saw the patient in October 2018.  The patient was doing relatively well at that time and I did not make any psychotropic medication changes.     I saw the patient in January 2019.  At that time he was communicating a bit more and using gestures.  He continued to be frustrated with occasional conflict with caregivers.  We did not make any medication changes at that time.     I saw the patient in August 2019.  I met with the patient's mother and father for 20 minutes prior to that appointment.  They had some concerns about the patient's presentation and his cares.  They felt he was spending too much time on the iPad.  We did address this with the group home staff.  We made no medication changes at that time.  The patient moved back home with his mom and dad on October 30.  That went quite well for a week or so but  then the patient began showing more behavioral issues.  Shortly before the November 2019 meeting I did increase the patient's PRN Seroquel in the context of been increased behavioral issues and refusals of multiple medications.     I saw the patient in November 2019.  The patient had been at his current group home for about and was having some behaviors possibly related to adjustment.  The PRN Seroquel had been somewhat helpful.  There was questions as to whether the patient could benefit from a behavioral analyst and they were going to pursue that as well.      I saw the patient in November 2019. He was living in his parents house at that time. He was doing fairly well but had occasional outbursts. Oftentimes these were directed towards his father in frustration. They were starting with a behavioral analyst to begin working on those issues.     I saw the patient in September 2020.  He was doing relatively well at that time and tolerating them change in group homes.  We continued his medication regime.    The patient in March 2020.  He was doing fairly well at that time and was attending speech therapy which was reportedly helpful.  We elected to continue with his current treatment plan at that time and did not make any changes.    Saw the patient in June 2021.  He continued to make progress in therapies.  He was becoming stronger and was dressing himself.  He still had periods where he was upset and refused cares.  He was working with the behavioral analyst and that seem to be helping.  We did not make any medication changes at that visit.     HPI:  Patient presents today for the purposes of medication management.   I had an opportunity to interview the patient and his father today.  The patient was not able participate much.  The father however reported the patient was doing relatively well and he felt that the behavioral analyst was helping quite a bit.  His primary care doctor continue to follow the patient and  there were no new medical issues or concerns.  The prior visit we did change his Seroquel dosing to 12.5 mg in the morning and 15 mg at night and the patient seemed to be tolerating that but continued to be somewhat groggy and irritable in the mornings.  The mornings seem to be more difficult for the patient.  There was no significant change in cognition.  No hallucinations or delusions.  No suicidality.  He seemed to be tolerating the medication although was a bit sleepy at times.  All of this appeared to be baseline.  He was adjusting to being back at home and the family noticed that a solid routine was extremely helpful.    We discussed a variety of treatment options and decided to move his Pristiq to morning dosing.  We will consider a future increase in that medication but first we will see whether this helps with his a.m. irritability.     We discussed some treatment options and have elected to continue with the current medications..      Current Medications: Please see chart. Medications personally reviewed.     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  No family history on file.  Current Outpatient Medications   Medication Sig Dispense Refill     acetaminophen (TYLENOL) 500 MG tablet Take 500 mg by mouth daily as needed for pain.       ascorbic acid (VITAMIN C) 250 MG tablet Take 250 mg by mouth daily.       cholecalciferol, vitamin D3, 50 mcg (2,000 unit) capsule Take 1 capsule (50 mcg) by mouth daily.       clonazePAM (KLONOPIN) 1 MG tablet 1 mg by Gastrostomy Tube route 4 (four) times a day.       desvenlafaxine succinate (PRISTIQ) 100 MG 24  hr tablet TAKE ONE TABLET BY MOUTH ONE TIME DAILY  30 tablet 0     docusate sodium (COLACE) 100 MG capsule TAKE TWO CAPSULES BY MOUTH DAILY AT BEDTIME AS NEEDED FOR CONSTIPATION       FIBER- mg tablet Take 1 tablet by mouth daily.       FLEET GLYCERIN, ADULT, Supp rectal suppository Insert 1 Suppository rectally every 12 hours as needed for Constipation.       hydrocortisone 1 % cream Apply topically 2 (two) times a day.       ibuprofen (ADVIL,MOTRIN) 200 MG tablet Take 200 mg by mouth every 6 (six) hours as needed for pain.       lamoTRIgine (LAMICTAL) 150 MG tablet Take 300 mg by mouth 2 (two) times a day.        loperamide (IMODIUM) 2 mg capsule Take 2 mg by mouth 4 (four) times a day as needed for diarrhea.       LORAZEPAM INTENSOL ORAL Take by mouth as needed (for seizures).       multivitamin with minerals tablet Take 1 tablet by mouth daily.       omeprazole (PRILOSEC) 20 MG capsule Take 20 mg by mouth daily.       polyethylene glycol (MIRALAX) 17 gram packet 17 g by Gastrostomy Tube route as needed.       QUEtiapine (SEROQUEL XR) 50 mg Tb24 24 hr tablet TAKE 1 TABLET BY MOUTH AT BEDTIME 31 each 3     QUEtiapine (SEROQUEL XR) 50 mg Tb24 24 hr tablet TAKE 1 TABLET BY MOUTH AT BEDTIME 31 each 3     QUEtiapine (SEROQUEL) 25 MG tablet TAKE 1/2 TABLET (12.5MG) BY MOUTH EVERY MORNING TAKE WITH THE FIRST MEAL OF THE DAY 15 tablet 3     QUEtiapine (SEROQUEL) 25 MG tablet Take 1-2 tablets (25-50 mg total) by mouth 3 (three) times a day as needed. 90 tablet 2     SENNA-S 8.6-50 mg tablet Take 1 tablet by mouth 2 (two) times a day as needed.       sennosides (SENNOSIDES) 8.8 mg/5 mL Syrp syrup 8.8 mg by Gastrostomy Tube route daily as needed.       TiZANidine (ZANAFLEX) 4 MG capsule Take 4 mg by mouth daily.        No current facility-administered medications for this visit.        Allergies   Allergen Reactions     Acetaminophen Other (See Comments)     Not specified     Aloe      Divalproex Sodium Other (See  Comments)     Intolerance       Hydrocodone      Hydrocodone-Acetaminophen Unknown     Levetiracetam      Valproic Acid      Social History     Socioeconomic History     Marital status: Single     Spouse name: Not on file     Number of children: Not on file     Years of education: Not on file     Highest education level: Not on file   Occupational History     Not on file   Social Needs     Financial resource strain: Not on file     Food insecurity     Worry: Not on file     Inability: Not on file     Transportation needs     Medical: Not on file     Non-medical: Not on file   Tobacco Use     Smoking status: Not on file   Substance and Sexual Activity     Alcohol use: Not on file     Drug use: Not on file     Sexual activity: Not on file   Lifestyle     Physical activity     Days per week: Not on file     Minutes per session: Not on file     Stress: Not on file   Relationships     Social connections     Talks on phone: Not on file     Gets together: Not on file     Attends Oriental orthodox service: Not on file     Active member of club or organization: Not on file     Attends meetings of clubs or organizations: Not on file     Relationship status: Not on file     Intimate partner violence     Fear of current or ex partner: Not on file     Emotionally abused: Not on file     Physically abused: Not on file     Forced sexual activity: Not on file   Other Topics Concern     Not on file   Social History Narrative     Not on file       The following portions of the patient's history were reviewed and updated as appropriate: allergies, current medications, past family history, past medical history, past social history, past surgical history and problem list.    Review of Systems  A comprehensive review of systems was negative except for what is noted above    Mental Status Exam:     Appearance: No significant change in the patient's presentation.  No new tremors.  Behavior:   The patient continues with occasional irritability.   This occurs most frequently in the morning.  No evidence of any dov.  Speech: The patient again did not participate during the interview with me.  Mood/Affect:   He continues with periodic frustration.  Often times a bit dismissive and disinterested.  Thought Content: .  No hallucinations or delusions   Suicidal or Homicidal Thoughts:   .  None apparent or reported  Thought Process/Formulation:  .  Baseline impaired.  No reports of any recent change.  Associations: .  Baseline impaired  Fund of Knowledge:  .  Baseline impaired  Attention/Concentration: .  Baseline distractible and often disinterested.  No obvious racing thoughts.  Insight: .  Baseline impaired  Judgement:  .  Baseline impaired  Memory:   . . Baseline impaired with limited participation   Motor Status:  .  No new tremors or asymmetries  Orientation: .  Baseline impaired     Diagnosis managed and treated at today's visit :    Major neurocognitive disorder with resultant mood and behavioral disturbance as well as cognitive deficits secondary to prior TBI in or about 2007     Rule out superimposed major depressive disorder, chronic, moderate, without psychotic features     Plan:  Medication Adjustment:  I am going to move the patient's Pristiq to a.m. dosing.  At this point we will make no other medication changes although I am considering an increase in that medication in the future.    Other:   The patient will return to clinic in 2 to 3 weeks for medication check.     Continue with the support of the clinic, reassurance, and redirection. Staff monitoring and ongoing assessments per team plan. Current psychotropic medication appears to represent the minimum effective dosage and appears medically necessary. We will continue to monitor and reassess. This team will utilize appropriate emergency services if necessary. I will make myself available if concerns or problems arise.    Total time spent with the patient today was 25 minutes with greater than  50% of the time spent in counseling and care coordination. The patient agrees to call before then with any questions, concerns or problems. We will assess for the appropriateness of possible psychotropic medication trials/changes. The patient will seek out appropriate emergency services should that become necessary.    Video Visit Details    Type of service: Video Visit    Video Start Time: 9:30 AM    Video End Time: 9:50 AM    Total time for video call: 20 minutes    Originating Location: Patient's home    Distant Location:  [facility]    Mode of Communication: Video call via [employer]      Total time for patient interview, interview with father, chart review and documentation was 25 minutes    Patient Instructions   It was nice speaking with you today for our office video visit. The following is a summary of our visit.    General Information:    If lab work was done today as part of your evaluation you will generally be contacted via My Chart, mail, or phone with the results within 1-5 days. If there is an alarming result we will contact you by phone. Lab results come back at varying times, I generally wait until all labs are resulted before making comments on results. Please note labs are automatically released to My Chart once available.     If you need refills please contact your pharmacist. They will send a refill request to me to review. Please allow 3 business days for us to process all refill requests.     Please call or send a medical message through My Chart, with any questions or concerns    If you need any paperwork completed please fax forms to [phone]. Please state if you would like a copy of the completed paperwork, mailed or faxed back to the patient and a fax number to fax the paperwork to. Please allow up to 10 days for paperwork to be completed.    Robin Parker MD                Again, thank you for allowing me to participate in the care of your  patient.        Sincerely,        Robin Parker MD

## 2021-09-28 NOTE — NURSING NOTE
Chief Complaint   Patient presents with     Follow Up     Follow up TBI     Video Visit     [employer] send text link to [phone]     Zaria Portillo LPN on 9/28/2021 at 9:20 AM

## 2021-10-26 ENCOUNTER — VIRTUAL VISIT (OUTPATIENT)
Dept: NEUROLOGY | Facility: CLINIC | Age: 36
End: 2021-10-26
Payer: MEDICARE

## 2021-10-26 DIAGNOSIS — F06.30 MOOD DISORDER IN CONDITIONS CLASSIFIED ELSEWHERE: Primary | ICD-10-CM

## 2021-10-26 PROCEDURE — 99214 OFFICE O/P EST MOD 30 MIN: CPT | Mod: 95 | Performed by: PSYCHIATRY & NEUROLOGY

## 2021-10-26 NOTE — PROGRESS NOTES
"Robin Blum is a 36 year old male who is being evaluated via a billable video visit in light of the ongoing global health crisis (COVID-19) that requires us to abide by social distancing mandates in order to reduce the risk of COVID-19 exposure.       The patient has been notified of following:     \"This video visit will be conducted via a video call between you and your physician/provider. We have found that certain health care needs can be provided without the need for a physical exam.  This service lets us provide the care you need with a short phone/video conversation.  If a prescription is necessary we can send it directly to your pharmacy.  If lab work is needed we can place an order for that and you can then stop by our lab to have the test done at a later time.    If during the course of the call the physician/provider feels a video visit is not appropriate, you will not be charged for this service.\"     Patient has given verbal consent to a video visit? Yes    Consent was obtained for this service by one of our care team members      Any new medication (other provider):   No   Meds started at last appointment:  No   Results: N/A  Meds increased/decreased at last appointment:    No   Results: N/A     Patient's impression of how medication is working? good      Compliant with Medication? Yes       Side Effects: Yes, drowsiness from some meds   Pain: No   Appetite change No    Sleep disturbance No   Change in energy No   Change in interest No   Change in concentration No    Psychosis/Hallucinations No   Negative thoughts Yes   Mood swings Yes   Alcohol use No   Drug use No   Anxiety Yes    Sad/depressed mood Yes     Questions or concerns?: No                                                          Phone Start Time: 9:55am    Phone End Time:  10:00am    Total time of phone conversation: 5 minutes    There were no vitals filed for this visit.    Patient would like the video invitation sent by: " [employer]  Number/e-mail address:[phone]       NARAYAN Meyer    Outpatient Follow up TBI Evaluation     Pertinent History:  The patient was referred to this clinic for further assessment and treatment .The patient has a history of significant cognitive mood and behavioral difficulties related to a prior traumatic brain injury.  After my April 2016 visit he had been followed by the nurse practitioner but reestablish contact with my clinic in October 2018.     Patient last saw the nurse practitioner and June 2018.  The patient was a bit more sedated at that time following a recent change in his Seroquel to extended release due to some increased agitation.  At that visit low-dose daytime Seroquel was added.     Patient is followed by therapist Ivet Rizo and last saw the therapist September 2018.  He moved to a new group home in 2018 and seemed to like that.  He seemed to be doing fairly well at that time.     I saw the patient in October 2018.  The patient was doing relatively well at that time and I did not make any psychotropic medication changes.     I saw the patient in January 2019.  At that time he was communicating a bit more and using gestures.  He continued to be frustrated with occasional conflict with caregivers.  We did not make any medication changes at that time.     I saw the patient in August 2019.  I met with the patient's mother and father for 20 minutes prior to that appointment.  They had some concerns about the patient's presentation and his cares.  They felt he was spending too much time on the iPad.  We did address this with the group home staff.  We made no medication changes at that time.  The patient moved back home with his mom and dad on October 30.  That went quite well for a week or so but then the patient began showing more behavioral issues.  Shortly before the November 2019 meeting I did increase the patient's PRN Seroquel in the context of been increased behavioral issues  and refusals of multiple medications.     I saw the patient in November 2019.  The patient had been at his current group home for about and was having some behaviors possibly related to adjustment.  The PRN Seroquel had been somewhat helpful.  There was questions as to whether the patient could benefit from a behavioral analyst and they were going to pursue that as well.      I saw the patient in November 2019. He was living in his parents house at that time. He was doing fairly well but had occasional outbursts. Oftentimes these were directed towards his father in frustration. They were starting with a behavioral analyst to begin working on those issues.     I saw the patient in September 2020.  He was doing relatively well at that time and tolerating them change in group homes.  We continued his medication regime.     The patient in March 2020.  He was doing fairly well at that time and was attending speech therapy which was reportedly helpful.  We elected to continue with his current treatment plan at that time and did not make any changes.     Saw the patient in June 2021.  He continued to make progress in therapies.  He was becoming stronger and was dressing himself.  He still had periods where he was upset and refused cares.  He was working with the behavioral analyst and that seem to be helping.  We did not make any medication changes at that visit.     HPI:  Patient presents today for the purposes of medication management.   Patient presents for a video visit today.  He presents along with his father.  His mother is also in the room.  They report things are going a bit better.  The patient is calmer.  He seems to be tracking better he is better.  He is working with his therapist and that is going well.  Unfortunately the patient's parents developed COVID after they were vaccinated.  Due to that the patient has been a bit more isolated from his parents.  With that the patient is spent more time on his  computer and less time interacting.  He is sleeping well at night but occasionally will stay up and watch TV.  He has been getting enough sleep and does not appear to be overly tired.  There is been no suicidality.  No hallucinations or delusions.  No other new medical issues or concerns and no side effects to the medication.  We discussed a variety of treatment options including the possibility of a further increase in the Pristiq but they would like to continue with the current dose at this time and see how reintroducing more therapy and more social activity may improve the patient.  He does better when he has a set schedule and they will be able to get back to his schedule in the near future.  They have no other questions or concerns and have not noted any side effects with the medication.  There is been no new allergies and no new diagnoses.     We discussed some treatment options and have elected to continue with the current medication and consider a future increase in the Pristiq..      Current Medications: Please see chart. Medications personally reviewed.     Patient Active Problem List    Diagnosis Date Noted     Acute encephalopathy 02/05/2015     Priority: Medium     TBI (traumatic brain injury) (H) 11/06/2011     Priority: Medium     Therapeutic drug monitoring 04/22/2011     Priority: Medium     Attention deficit hyperactivity disorder (ADHD) 04/22/2011     Priority: Medium     Problem list name updated by automated process. Provider to review       Constipation      Priority: Medium     Problem list name updated by automated process. Provider to review       Paraplegia (H)      Priority: Medium     Depressive disorder, not elsewhere classified      Priority: Medium     Personal history of traumatic brain injury      Priority: Medium     Esophageal reflux      Priority: Medium     Myoclonus      Priority: Medium     Aphasia      Priority: Medium     Past Medical History:   Diagnosis Date     Aphasia       Attention deficit disorder with hyperactivity(314.01)      Depressive disorder, not elsewhere classified      Esophageal reflux      Myoclonus      OCD (obsessive compulsive disorder)      Other, mixed, or unspecified nondependent drug abuse, in remission      Paraplegia (H)      Personal history of traumatic brain injury      Unspecified constipation      No past surgical history on file.  Family History   Problem Relation Age of Onset     Coronary Artery Disease Father      Hyperlipidemia Father      Depression/Anxiety Paternal Grandfather         Alcohol abuse     Current Outpatient Medications   Medication Sig Dispense Refill     Acetaminophen 500 MG CAPS Take 1 tablet by mouth every 4 hours as needed. (Patient taking differently: Take 1-2 tablets by mouth every 4 hours as needed ) 100 capsule 0     alum & mag hydroxide-simethicone (MYLANTA ES/MAALOX  ES) 400-400-40 MG/5ML SUSP Take 10-20 mLs by mouth every 4 hours as needed for indigestion       ascorbic acid (VITAMIN C) 250 MG CHEW Take 1 tablet by mouth daily. (Patient taking differently: Take 250 mg by mouth daily (with lunch) ) 30 tablet 0     calcium polycarbophil (FIBERCON) 625 MG tablet Take 1 tablet by mouth daily (with lunch)       cefUROXime (CEFTIN) 500 MG tablet Take 1 tablet (500 mg) by mouth 2 times daily 20 tablet 0     Cholecalciferol (VITAMIN D3) 50 MCG (2000 UT) CAPS 2,000 Units 2 times daily        ClonazePAM (KLONOPIN PO) Take 1 mg by mouth 4 times daily 08/12/16/22       clotrimazole (LOTRIMIN) 1 % cream Apply topically as needed       Desvenlafaxine Succinate (PRISTIQ PO) Take 100 mg by mouth daily (with lunch) 1200 noon       DOCUSATE SODIUM PO Take 200 mg by mouth At Bedtime       FIBER PO Take 1 tablet by mouth daily       fluticasone (VERAMYST) 27.5 MCG/SPRAY spray Spray 2 sprays into both nostrils daily       IBUPROFEN PO Take 200-400 mg by mouth every 4 hours as needed for moderate pain       lamoTRIgine (LAMICTAL ODT) 100 MG  TBDP Take 1 tablet (100 mg) by mouth daily (Patient taking differently: Take 150 mg by mouth 2 times daily ) 30 tablet 0     loperamide (IMODIUM A-D) 2 MG tablet Take 2 mg by mouth as needed for diarrhea       LORazepam (ATIVAN) 2 MG/ML concentrated solution Take 2 mg by mouth daily as needed for anxiety       Miconazole Nitrate (LOTRIMIN AF) 2 % AERO Externally apply topically as needed        Multiple Vitamin (DAILY EARL) TABS Take 1 tablet by mouth daily. 30 tablet 5     Omeprazole 20 MG tablet Take 20 mg by mouth daily. (Patient taking differently: Take 20 mg by mouth daily (with dinner) ) 30 tablet 0     Phenylephrine-DM-GG-APAP (SUDAFED PE PRESSURE+PAIN+COLD PO) Take by mouth as needed       polyethylene glycol (MIRALAX/GLYCOLAX) packet Take 1.5 packets by mouth daily       pyrithione zinc (SELSUN BLUE SALON) 1 % SHAM Apply topically daily as needed for irritation        QUEtiapine (SEROQUEL) 25 MG tablet Take 25 mg by mouth 3 times daily as needed       quetiapine (SEROQUEL) 50 MG tablet Take 1 tablet by mouth. 1 tablet po daily (Patient taking differently: Take 12.5 mg by mouth every morning 1 tablet po daily) 30 tablet 0     senna-docusate (SENNA PLUS) 8.6-50 MG per tablet Take 1 tablet by mouth 2 times daily. 60 tablet 0     sodium chloride (OCEAN) 0.65 % nasal spray Spray 1 spray into both nostrils as needed for congestion       Sodium Phosphates (FLEET ENEMA RE) Place rectally as needed       SPORT SUNSCREEN SPF15 EX Externally apply topically as needed       TIZANidine (ZANAFLEX) 2 MG tablet Take 2 tablets by mouth. Twice daily (Patient taking differently: Take 4 mg by mouth daily Twice daily) 120 tablet 0     Cholecalciferol (VITAMIN D3 PO) Take 400 Units by mouth daily (with lunch)       clonazePAM (KLONOPIN) 1 MG tablet TAKE ONE TABLET BY MOUTH FOUR TIMES DAILY       Glycerin, Laxative, (FLEET LIQUID GLYCERIN SUPP RE) Place 2 g rectally as needed (Patient not taking: Reported on 10/26/2021)        guaiFENesin (ROBITUSSIN MUCUS+CHEST CONGEST) 100 MG/5ML LIQD Take 10 mLs by mouth every 4 hours as needed for cough (Patient not taking: Reported on 10/26/2021)       hydrocortisone (CORTAID) 1 % cream Apply topically as needed (Patient not taking: Reported on 9/28/2021)       Magnesium Hydroxide (MILK OF MAGNESIA PO) Take by mouth as needed (Patient not taking: Reported on 10/26/2021)       QUEtiapine (SEROQUEL) 50 MG tablet Take 50 mg by mouth At Bedtime         Allergies   Allergen Reactions     Aloe      Depakote [Valproic Acid]      Hydrocodone      Vicodin [Hydrocodone-Acetaminophen]      Social History     Socioeconomic History     Marital status: Single     Spouse name: Not on file     Number of children: Not on file     Years of education: Not on file     Highest education level: Not on file   Occupational History     Not on file   Tobacco Use     Smoking status: Never Smoker     Smokeless tobacco: Never Used   Substance and Sexual Activity     Alcohol use: No     Alcohol/week: 0.0 standard drinks     Drug use: No     Sexual activity: Not on file   Other Topics Concern     Not on file   Social History Narrative     Not on file     Social Determinants of Health     Financial Resource Strain:      Difficulty of Paying Living Expenses:    Food Insecurity:      Worried About Running Out of Food in the Last Year:      Ran Out of Food in the Last Year:    Transportation Needs:      Lack of Transportation (Medical):      Lack of Transportation (Non-Medical):    Physical Activity:      Days of Exercise per Week:      Minutes of Exercise per Session:    Stress:      Feeling of Stress :    Social Connections:      Frequency of Communication with Friends and Family:      Frequency of Social Gatherings with Friends and Family:      Attends Worship Services:      Active Member of Clubs or Organizations:      Attends Club or Organization Meetings:      Marital Status:    Intimate Partner Violence:      Fear of Current  or Ex-Partner:      Emotionally Abused:      Physically Abused:      Sexually Abused:        The following portions of the patient's history were reviewed and updated as appropriate: allergies, current medications, past family history, past medical history, past social history, past surgical history and problem list.    Review of Systems  A comprehensive review of systems was negative except for what is noted above     Mental Status Exam  Alertness: Alert and calm with good eye contact.  No pain or distress.  Appearance: \The patient was smiling throughout the interview.  No agitation or irritability.  Behavior/Demeanor: Cooperative and interested.  He was tracking a bit today.  No agitation.  No reports of any significant change.  He has occasional outbursts when he is frustrated.  Speech: The patient was mute today but did occasionally shake and nod his head when he was in agreement with what was being said.  Psychomotor: A bit brighter.  No lability.  Mood: Mood was good.  No depression or anxiety.  No dov.  Affect: Bright and smiling.  No irritability or agitation  Thought Process/Associations: Patient seemed to be better engaged today.  He was tracking better.  No racing thoughts.  Thought Content: No evidence of or reports of any hallucinations delusions  Perception: No significant change.    Insight: Baseline impaired.  Judgment: Baseline impaired   Attention/Concentration: Attentive.  He seems to be tracking a bit better today but still needs structure support  Language: Nonverbal.  Continues impaired with no obvious change  Fund of Knowledge: Impaired with no obvious change.    Memory: Continues impaired.  Difficult to assess due to communication difficulties.       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 They agree to call or return sooner with any questions or concerns.  Risks and benefits were discussed.  Continue with his individual therapist.     Continue with the support of the clinic, reassurance, and redirection. Staff monitoring and ongoing assessments per team plan. Current psychotropic medication appears to represent the minimum effective dosage and appears medically necessary. We will continue to monitor and reassess. This team will utilize appropriate emergency services if necessary. I will make myself available if concerns or problems arise.    Total time spent with the patient today was 25 minutes with greater than 50% of the time spent in counseling and care coordination. The patient agrees to call before then with any questions, concerns or problems. We will assess for the appropriateness of possible psychotropic medication trials/changes. The patient will seek out appropriate emergency services should that become necessary.    Video Visit Details    Type of service: Video Visit    Video Start Time: 10:15 AM    Video End Time: 10:35 AM    Total time for video call: 20 minutes    Originating Location: Patient's home    Distant Location:  [facility]    Mode of Communication: Video call via [employer]      Total time for patient interview, interview with patient's dad as well as chart review and documentation was 25 minutes.    Patient Instructions   It was nice speaking with you today for our office video visit. The following is a summary of our visit.    General Information:    If lab work was done today as part of your evaluation you will generally be contacted via My Chart, mail, or phone with the results within 1-5 days. If there is an alarming result we will contact you by phone. Lab results come back at varying times, I generally wait until all labs are resulted before making comments on results. Please note labs are automatically released to  My Chart once available.     If you need refills please contact your pharmacist. They will send a refill request to me to review. Please allow 3 business days for us to process all refill requests.     Please call or send a medical message through My Chart, with any questions or concerns.    If you need any paperwork completed please fax forms to [phone]. Please state if you would like a copy of the completed paperwork, mailed or faxed back to the patient and a fax number to fax the paperwork to. Please allow up to 10 business days for paperwork to be completed.    Robin Parker MD

## 2021-10-26 NOTE — LETTER
"    10/26/2021         RE: Robin Blum  [id number] [facility] [address]        Dear Colleague,    Thank you for referring your patient, Robin Blum, to the [facility]. Please see a copy of my visit note below.    Robin Blum is a 36 year old male who is being evaluated via a billable video visit in light of the ongoing global health crisis (COVID-19) that requires us to abide by social distancing mandates in order to reduce the risk of COVID-19 exposure.       The patient has been notified of following:     \"This video visit will be conducted via a video call between you and your physician/provider. We have found that certain health care needs can be provided without the need for a physical exam.  This service lets us provide the care you need with a short phone/video conversation.  If a prescription is necessary we can send it directly to your pharmacy.  If lab work is needed we can place an order for that and you can then stop by our lab to have the test done at a later time.    If during the course of the call the physician/provider feels a video visit is not appropriate, you will not be charged for this service.\"     Patient has given verbal consent to a video visit? Yes    Consent was obtained for this service by one of our care team members      Any new medication (other provider):   No   Meds started at last appointment:  No   Results: N/A  Meds increased/decreased at last appointment:    No   Results: N/A     Patient's impression of how medication is working? good      Compliant with Medication? Yes       Side Effects: Yes, drowsiness from some meds   Pain: No   Appetite change No    Sleep disturbance No   Change in energy No   Change in interest No   Change in concentration No    Psychosis/Hallucinations No   Negative thoughts Yes   Mood swings Yes   Alcohol use No   Drug use No   Anxiety Yes    Sad/depressed mood Yes     Questions or concerns?: No           "                                                Phone Start Time: 9:55am    Phone End Time:  10:00am    Total time of phone conversation: 5 minutes    There were no vitals filed for this visit.    Patient would like the video invitation sent by: [employer]  Number/e-mail address:[phone]       NARAYAN Meyer    Outpatient Follow up TBI Evaluation     Pertinent History:  The patient was referred to this clinic for further assessment and treatment .The patient has a history of significant cognitive mood and behavioral difficulties related to a prior traumatic brain injury.  After my April 2016 visit he had been followed by the nurse practitioner but reestablish contact with my clinic in October 2018.     Patient last saw the nurse practitioner and June 2018.  The patient was a bit more sedated at that time following a recent change in his Seroquel to extended release due to some increased agitation.  At that visit low-dose daytime Seroquel was added.     Patient is followed by therapist Ivet Rizo and last saw the therapist September 2018.  He moved to a new group home in 2018 and seemed to like that.  He seemed to be doing fairly well at that time.     I saw the patient in October 2018.  The patient was doing relatively well at that time and I did not make any psychotropic medication changes.     I saw the patient in January 2019.  At that time he was communicating a bit more and using gestures.  He continued to be frustrated with occasional conflict with caregivers.  We did not make any medication changes at that time.     I saw the patient in August 2019.  I met with the patient's mother and father for 20 minutes prior to that appointment.  They had some concerns about the patient's presentation and his cares.  They felt he was spending too much time on the iPad.  We did address this with the group home staff.  We made no medication changes at that time.  The patient moved back home with his mom and dad on  October 30.  That went quite well for a week or so but then the patient began showing more behavioral issues.  Shortly before the November 2019 meeting I did increase the patient's PRN Seroquel in the context of been increased behavioral issues and refusals of multiple medications.     I saw the patient in November 2019.  The patient had been at his current group home for about and was having some behaviors possibly related to adjustment.  The PRN Seroquel had been somewhat helpful.  There was questions as to whether the patient could benefit from a behavioral analyst and they were going to pursue that as well.      I saw the patient in November 2019. He was living in his parents house at that time. He was doing fairly well but had occasional outbursts. Oftentimes these were directed towards his father in frustration. They were starting with a behavioral analyst to begin working on those issues.     I saw the patient in September 2020.  He was doing relatively well at that time and tolerating them change in group homes.  We continued his medication regime.     The patient in March 2020.  He was doing fairly well at that time and was attending speech therapy which was reportedly helpful.  We elected to continue with his current treatment plan at that time and did not make any changes.     Saw the patient in June 2021.  He continued to make progress in therapies.  He was becoming stronger and was dressing himself.  He still had periods where he was upset and refused cares.  He was working with the behavioral analyst and that seem to be helping.  We did not make any medication changes at that visit.     HPI:  Patient presents today for the purposes of medication management.   Patient presents for a video visit today.  He presents along with his father.  His mother is also in the room.  They report things are going a bit better.  The patient is calmer.  He seems to be tracking better he is better.  He is working with  his therapist and that is going well.  Unfortunately the patient's parents developed COVID after they were vaccinated.  Due to that the patient has been a bit more isolated from his parents.  With that the patient is spent more time on his computer and less time interacting.  He is sleeping well at night but occasionally will stay up and watch TV.  He has been getting enough sleep and does not appear to be overly tired.  There is been no suicidality.  No hallucinations or delusions.  No other new medical issues or concerns and no side effects to the medication.  We discussed a variety of treatment options including the possibility of a further increase in the Pristiq but they would like to continue with the current dose at this time and see how reintroducing more therapy and more social activity may improve the patient.  He does better when he has a set schedule and they will be able to get back to his schedule in the near future.  They have no other questions or concerns and have not noted any side effects with the medication.  There is been no new allergies and no new diagnoses.     We discussed some treatment options and have elected to continue with the current medication and consider a future increase in the Pristiq..      Current Medications: Please see chart. Medications personally reviewed.     Patient Active Problem List    Diagnosis Date Noted     Acute encephalopathy 02/05/2015     Priority: Medium     TBI (traumatic brain injury) (H) 11/06/2011     Priority: Medium     Therapeutic drug monitoring 04/22/2011     Priority: Medium     Attention deficit hyperactivity disorder (ADHD) 04/22/2011     Priority: Medium     Problem list name updated by automated process. Provider to review       Constipation      Priority: Medium     Problem list name updated by automated process. Provider to review       Paraplegia (H)      Priority: Medium     Depressive disorder, not elsewhere classified      Priority: Medium      Personal history of traumatic brain injury      Priority: Medium     Esophageal reflux      Priority: Medium     Myoclonus      Priority: Medium     Aphasia      Priority: Medium     Past Medical History:   Diagnosis Date     Aphasia      Attention deficit disorder with hyperactivity(314.01)      Depressive disorder, not elsewhere classified      Esophageal reflux      Myoclonus      OCD (obsessive compulsive disorder)      Other, mixed, or unspecified nondependent drug abuse, in remission      Paraplegia (H)      Personal history of traumatic brain injury      Unspecified constipation      No past surgical history on file.  Family History   Problem Relation Age of Onset     Coronary Artery Disease Father      Hyperlipidemia Father      Depression/Anxiety Paternal Grandfather         Alcohol abuse     Current Outpatient Medications   Medication Sig Dispense Refill     Acetaminophen 500 MG CAPS Take 1 tablet by mouth every 4 hours as needed. (Patient taking differently: Take 1-2 tablets by mouth every 4 hours as needed ) 100 capsule 0     alum & mag hydroxide-simethicone (MYLANTA ES/MAALOX  ES) 400-400-40 MG/5ML SUSP Take 10-20 mLs by mouth every 4 hours as needed for indigestion       ascorbic acid (VITAMIN C) 250 MG CHEW Take 1 tablet by mouth daily. (Patient taking differently: Take 250 mg by mouth daily (with lunch) ) 30 tablet 0     calcium polycarbophil (FIBERCON) 625 MG tablet Take 1 tablet by mouth daily (with lunch)       cefUROXime (CEFTIN) 500 MG tablet Take 1 tablet (500 mg) by mouth 2 times daily 20 tablet 0     Cholecalciferol (VITAMIN D3) 50 MCG (2000 UT) CAPS 2,000 Units 2 times daily        ClonazePAM (KLONOPIN PO) Take 1 mg by mouth 4 times daily 08/12/16/22       clotrimazole (LOTRIMIN) 1 % cream Apply topically as needed       Desvenlafaxine Succinate (PRISTIQ PO) Take 100 mg by mouth daily (with lunch) 1200 noon       DOCUSATE SODIUM PO Take 200 mg by mouth At Bedtime       FIBER PO Take 1  tablet by mouth daily       fluticasone (VERAMYST) 27.5 MCG/SPRAY spray Spray 2 sprays into both nostrils daily       IBUPROFEN PO Take 200-400 mg by mouth every 4 hours as needed for moderate pain       lamoTRIgine (LAMICTAL ODT) 100 MG TBDP Take 1 tablet (100 mg) by mouth daily (Patient taking differently: Take 150 mg by mouth 2 times daily ) 30 tablet 0     loperamide (IMODIUM A-D) 2 MG tablet Take 2 mg by mouth as needed for diarrhea       LORazepam (ATIVAN) 2 MG/ML concentrated solution Take 2 mg by mouth daily as needed for anxiety       Miconazole Nitrate (LOTRIMIN AF) 2 % AERO Externally apply topically as needed        Multiple Vitamin (DAILY EARL) TABS Take 1 tablet by mouth daily. 30 tablet 5     Omeprazole 20 MG tablet Take 20 mg by mouth daily. (Patient taking differently: Take 20 mg by mouth daily (with dinner) ) 30 tablet 0     Phenylephrine-DM-GG-APAP (SUDAFED PE PRESSURE+PAIN+COLD PO) Take by mouth as needed       polyethylene glycol (MIRALAX/GLYCOLAX) packet Take 1.5 packets by mouth daily       pyrithione zinc (SELSUN BLUE SALON) 1 % SHAM Apply topically daily as needed for irritation        QUEtiapine (SEROQUEL) 25 MG tablet Take 25 mg by mouth 3 times daily as needed       quetiapine (SEROQUEL) 50 MG tablet Take 1 tablet by mouth. 1 tablet po daily (Patient taking differently: Take 12.5 mg by mouth every morning 1 tablet po daily) 30 tablet 0     senna-docusate (SENNA PLUS) 8.6-50 MG per tablet Take 1 tablet by mouth 2 times daily. 60 tablet 0     sodium chloride (OCEAN) 0.65 % nasal spray Spray 1 spray into both nostrils as needed for congestion       Sodium Phosphates (FLEET ENEMA RE) Place rectally as needed       SPORT SUNSCREEN SPF15 EX Externally apply topically as needed       TIZANidine (ZANAFLEX) 2 MG tablet Take 2 tablets by mouth. Twice daily (Patient taking differently: Take 4 mg by mouth daily Twice daily) 120 tablet 0     Cholecalciferol (VITAMIN D3 PO) Take 400 Units by mouth  daily (with lunch)       clonazePAM (KLONOPIN) 1 MG tablet TAKE ONE TABLET BY MOUTH FOUR TIMES DAILY       Glycerin, Laxative, (FLEET LIQUID GLYCERIN SUPP RE) Place 2 g rectally as needed (Patient not taking: Reported on 10/26/2021)       guaiFENesin (ROBITUSSIN MUCUS+CHEST CONGEST) 100 MG/5ML LIQD Take 10 mLs by mouth every 4 hours as needed for cough (Patient not taking: Reported on 10/26/2021)       hydrocortisone (CORTAID) 1 % cream Apply topically as needed (Patient not taking: Reported on 9/28/2021)       Magnesium Hydroxide (MILK OF MAGNESIA PO) Take by mouth as needed (Patient not taking: Reported on 10/26/2021)       QUEtiapine (SEROQUEL) 50 MG tablet Take 50 mg by mouth At Bedtime         Allergies   Allergen Reactions     Aloe      Depakote [Valproic Acid]      Hydrocodone      Vicodin [Hydrocodone-Acetaminophen]      Social History     Socioeconomic History     Marital status: Single     Spouse name: Not on file     Number of children: Not on file     Years of education: Not on file     Highest education level: Not on file   Occupational History     Not on file   Tobacco Use     Smoking status: Never Smoker     Smokeless tobacco: Never Used   Substance and Sexual Activity     Alcohol use: No     Alcohol/week: 0.0 standard drinks     Drug use: No     Sexual activity: Not on file   Other Topics Concern     Not on file   Social History Narrative     Not on file     Social Determinants of Health     Financial Resource Strain:      Difficulty of Paying Living Expenses:    Food Insecurity:      Worried About Running Out of Food in the Last Year:      Ran Out of Food in the Last Year:    Transportation Needs:      Lack of Transportation (Medical):      Lack of Transportation (Non-Medical):    Physical Activity:      Days of Exercise per Week:      Minutes of Exercise per Session:    Stress:      Feeling of Stress :    Social Connections:      Frequency of Communication with Friends and Family:      Frequency of  Social Gatherings with Friends and Family:      Attends Rastafari Services:      Active Member of Clubs or Organizations:      Attends Club or Organization Meetings:      Marital Status:    Intimate Partner Violence:      Fear of Current or Ex-Partner:      Emotionally Abused:      Physically Abused:      Sexually Abused:        The following portions of the patient's history were reviewed and updated as appropriate: allergies, current medications, past family history, past medical history, past social history, past surgical history and problem list.    Review of Systems  A comprehensive review of systems was negative except for what is noted above     Mental Status Exam  Alertness: Alert and calm with good eye contact.  No pain or distress.  Appearance: \The patient was smiling throughout the interview.  No agitation or irritability.  Behavior/Demeanor: Cooperative and interested.  He was tracking a bit today.  No agitation.  No reports of any significant change.  He has occasional outbursts when he is frustrated.  Speech: The patient was mute today but did occasionally shake and nod his head when he was in agreement with what was being said.  Psychomotor: A bit brighter.  No lability.  Mood: Mood was good.  No depression or anxiety.  No dov.  Affect: Bright and smiling.  No irritability or agitation  Thought Process/Associations: Patient seemed to be better engaged today.  He was tracking better.  No racing thoughts.  Thought Content: No evidence of or reports of any hallucinations delusions  Perception: No significant change.    Insight: Baseline impaired.  Judgment: Baseline impaired   Attention/Concentration: Attentive.  He seems to be tracking a bit better today but still needs structure support  Language: Nonverbal.  Continues impaired with no obvious change  Fund of Knowledge: Impaired with no obvious change.    Memory: Continues impaired.  Difficult to assess due to communication difficulties.        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 They agree to call or return sooner with any questions or concerns.  Risks and benefits were discussed.  Continue with his individual therapist.     Continue with the support of the clinic, reassurance, and redirection. Staff monitoring and ongoing assessments per team plan. Current psychotropic medication appears to represent the minimum effective dosage and appears medically necessary. We will continue to monitor and reassess. This team will utilize appropriate emergency services if necessary. I will make myself available if concerns or problems arise.    Total time spent with the patient today was 25 minutes with greater than 50% of the time spent in counseling and care coordination. The patient agrees to call before then with any questions, concerns or problems. We will assess for the appropriateness of possible psychotropic medication trials/changes. The patient will seek out appropriate emergency services should that become necessary.    Video Visit Details    Type of service: Video Visit    Video Start Time: 10:15 AM    Video End Time: 10:35 AM    Total time for video call: 20 minutes    Originating Location: Patient's home    Distant Location:  [facility] [place]    Mode of Communication: Video call via [email or web address]      Total time for patient interview, interview with patient's dad as well as chart review and documentation was 25 minutes.    Patient Instructions   It was nice speaking with you today for our office video visit. The following is a summary of our visit.    General Information:    If lab work was done today as part of your evaluation you will generally be contacted via My Chart, mail, or phone with the results  within 1-5 days. If there is an alarming result we will contact you by phone. Lab results come back at varying times, I generally wait until all labs are resulted before making comments on results. Please note labs are automatically released to My Chart once available.     If you need refills please contact your pharmacist. They will send a refill request to me to review. Please allow 3 business days for us to process all refill requests.     Please call or send a medical message through My Chart, with any questions or concerns.    If you need any paperwork completed please fax forms to [phone]. Please state if you would like a copy of the completed paperwork, mailed or faxed back to the patient and a fax number to fax the paperwork to. Please allow up to 10 business days for paperwork to be completed.    Robin Parker MD        Again, thank you for allowing me to participate in the care of your patient.        Sincerely,        Robin Parker MD

## 2022-02-01 ENCOUNTER — VIRTUAL VISIT (OUTPATIENT)
Dept: NEUROLOGY | Facility: CLINIC | Age: 37
End: 2022-02-01
Payer: MEDICARE

## 2022-02-01 DIAGNOSIS — F06.30 MOOD DISORDER AS LATE EFFECT OF TRAUMATIC BRAIN INJURY (H): Primary | ICD-10-CM

## 2022-02-01 DIAGNOSIS — S06.9XAS MOOD DISORDER AS LATE EFFECT OF TRAUMATIC BRAIN INJURY (H): Primary | ICD-10-CM

## 2022-02-01 PROCEDURE — 99214 OFFICE O/P EST MOD 30 MIN: CPT | Mod: 95 | Performed by: PSYCHIATRY & NEUROLOGY

## 2022-02-01 NOTE — PROGRESS NOTES
"Robin Blum is a 36 year old male who is being evaluated via a billable video visit in light of the ongoing global health crisis (COVID-19) that requires us to abide by social distancing mandates in order to reduce the risk of COVID-19 exposure.       The patient has been notified of following:     \"This video visit will be conducted via a video call between you and your physician/provider. We have found that certain health care needs can be provided without the need for a physical exam.  This service lets us provide the care you need with a short phone/video conversation.  If a prescription is necessary we can send it directly to your pharmacy.  If lab work is needed we can place an order for that and you can then stop by our lab to have the test done at a later time.    If during the course of the call the physician/provider feels a video visit is not appropriate, you will not be charged for this service.\"     Patient has given verbal consent to a video visit? Yes    Consent was obtained for this service by one of our care team members      Any new medication (other provider):   No   Meds started at last appointment:  No   Results: N/A  Meds increased/decreased at last appointment:    No   Results: N/A     Patient's impression of how medication is working? working good      Compliant with Medication? Yes       Side Effects: No   Pain: No   Appetite change No    Sleep disturbance No   Change in energy No   Change in interest No   Change in concentration No    Psychosis/Hallucinations No   Negative thoughts Yes   Mood swings Yes   Alcohol use No   Drug use No   Anxiety Yes    Sad/depressed mood Yes     Questions or concerns?: No                                                          Phone Start Time: 9:40 am    Phone End Time:  9:45 am    Total time of phone conversation: 5 minutes    There were no vitals filed for this visit.    Patient would like the video invitation sent by: [employer]  Number/e-mail address: " [phone]     NARAYAN Meyer    Outpatient Follow up TBI Evaluation     Pertinent History: The patient was referred to this clinic for further assessment and treatment .The patient has a history of significant cognitive mood and behavioral difficulties related to a prior traumatic brain injury.  After my April 2016 visit he had been followed by the nurse practitioner but reestablish contact with my clinic in October 2018.     Patient last saw the nurse practitioner and June 2018.  The patient was a bit more sedated at that time following a recent change in his Seroquel to extended release due to some increased agitation.  At that visit low-dose daytime Seroquel was added.     Patient is followed by therapist Ivet Rizo and last saw the therapist September 2018.  He moved to a new group home in 2018 and seemed to like that.  He seemed to be doing fairly well at that time.     I saw the patient in October 2018.  The patient was doing relatively well at that time and I did not make any psychotropic medication changes.     I saw the patient in January 2019.  At that time he was communicating a bit more and using gestures.  He continued to be frustrated with occasional conflict with caregivers.  We did not make any medication changes at that time.     I saw the patient in August 2019.  I met with the patient's mother and father for 20 minutes prior to that appointment.  They had some concerns about the patient's presentation and his cares.  They felt he was spending too much time on the iPad.  We did address this with the group home staff.  We made no medication changes at that time.  The patient moved back home with his mom and dad on October 30.  That went quite well for a week or so but then the patient began showing more behavioral issues.  Shortly before the November 2019 meeting I did increase the patient's PRN Seroquel in the context of been increased behavioral issues and refusals of multiple  medications.     I saw the patient in November 2019.  The patient had been at his current group home for about and was having some behaviors possibly related to adjustment.  The PRN Seroquel had been somewhat helpful.  There was questions as to whether the patient could benefit from a behavioral analyst and they were going to pursue that as well.      I saw the patient in November 2019. He was living in his parents house at that time. He was doing fairly well but had occasional outbursts. Oftentimes these were directed towards his father in frustration. They were starting with a behavioral analyst to begin working on those issues.     I saw the patient in September 2020.  He was doing relatively well at that time and tolerating them change in group homes.  We continued his medication regime.     The patient in March 2020.  He was doing fairly well at that time and was attending speech therapy which was reportedly helpful.  We elected to continue with his current treatment plan at that time and did not make any changes.     Saw the patient in June 2021.  He continued to make progress in therapies.  He was becoming stronger and was dressing himself.  He still had periods where he was upset and refused cares.  He was working with the behavioral analyst and that seem to be helping.  We did not make any medication changes at that visit.    The patient was seen in October 2021.  I also interviewed his mother and father at that time.  The patient was isolating a bit but was getting back on a more regular schedule.  We had considered an increase in the Pristiq.  He was tolerating the medication.  We continue with the current treatment plan at that time and did not make any changes.     HPI:  Patient presents today for the purposes of medication management.   I had an opportunity to interview the patient as well as his father and mother and his caregiver.  All reported Calixto was doing quite well and seem to be tolerating the  current treatment plan.  They had no specific concerns or complaints.    The patient has been sleeping fairly well.  The family is trying to set limits regarding his access to screen time and that seems to be helping.  They have been able to lock some about passwords so the patient does not have unlimited access to the computer and that seems to be helping with his ability to participate in things and with his behavior.  There is been no evidence of any significant depression.  No anxiety.  No significant agitation.  He will get frustrated at times but if they do give him some space he tends to calm down quickly and can reengage in activities.    No change in cognition.  No new health problems or concerns.  No obvious side effects to the medication.  He has been vaccinated against coronavirus and tolerated that well.  We did discuss treatment options and the family and patient would like to continue with the current medications as ordered.     We discussed some treatment options and have elected to continue with the current medications.      Current Medications: Please see chart. Medications personally reviewed.     Patient Active Problem List    Diagnosis Date Noted     Acute encephalopathy 02/05/2015     Priority: Medium     TBI (traumatic brain injury) (H) 11/06/2011     Priority: Medium     Therapeutic drug monitoring 04/22/2011     Priority: Medium     Attention deficit hyperactivity disorder (ADHD) 04/22/2011     Priority: Medium     Problem list name updated by automated process. Provider to review       Constipation      Priority: Medium     Problem list name updated by automated process. Provider to review       Paraplegia (H)      Priority: Medium     Depressive disorder, not elsewhere classified      Priority: Medium     Personal history of traumatic brain injury      Priority: Medium     Esophageal reflux      Priority: Medium     Myoclonus      Priority: Medium     Aphasia      Priority: Medium     Past  Medical History:   Diagnosis Date     Aphasia      Attention deficit disorder with hyperactivity(314.01)      Depressive disorder, not elsewhere classified      Esophageal reflux      Myoclonus      OCD (obsessive compulsive disorder)      Other, mixed, or unspecified nondependent drug abuse, in remission      Paraplegia (H)      Personal history of traumatic brain injury      Unspecified constipation      No past surgical history on file.  Family History   Problem Relation Age of Onset     Coronary Artery Disease Father      Hyperlipidemia Father      Depression/Anxiety Paternal Grandfather         Alcohol abuse     Current Outpatient Medications   Medication Sig Dispense Refill     Acetaminophen 500 MG CAPS Take 1 tablet by mouth every 4 hours as needed. (Patient taking differently: Take 1-2 tablets by mouth every 4 hours as needed ) 100 capsule 0     alum & mag hydroxide-simethicone (MYLANTA ES/MAALOX  ES) 400-400-40 MG/5ML SUSP Take 10-20 mLs by mouth every 4 hours as needed for indigestion       ascorbic acid (VITAMIN C) 250 MG CHEW Take 1 tablet by mouth daily. (Patient taking differently: Take 250 mg by mouth daily (with lunch) ) 30 tablet 0     calcium polycarbophil (FIBERCON) 625 MG tablet Take 1 tablet by mouth daily (with lunch)       cefUROXime (CEFTIN) 500 MG tablet Take 1 tablet (500 mg) by mouth 2 times daily 20 tablet 0     Cholecalciferol (VITAMIN D3 PO) Take 400 Units by mouth daily (with lunch)       Cholecalciferol (VITAMIN D3) 50 MCG (2000 UT) CAPS 2,000 Units 2 times daily        ClonazePAM (KLONOPIN PO) Take 1 mg by mouth 4 times daily 08/12/16/22       clonazePAM (KLONOPIN) 1 MG tablet TAKE ONE TABLET BY MOUTH FOUR TIMES DAILY       clotrimazole (LOTRIMIN) 1 % cream Apply topically as needed       Desvenlafaxine Succinate (PRISTIQ PO) Take 100 mg by mouth daily (with lunch) 1200 noon       DOCUSATE SODIUM PO Take 200 mg by mouth At Bedtime       FIBER PO Take 1 tablet by mouth daily        fluticasone (VERAMYST) 27.5 MCG/SPRAY spray Spray 2 sprays into both nostrils daily       IBUPROFEN PO Take 200-400 mg by mouth every 4 hours as needed for moderate pain       lamoTRIgine (LAMICTAL ODT) 100 MG TBDP Take 1 tablet (100 mg) by mouth daily (Patient taking differently: Take 150 mg by mouth 2 times daily ) 30 tablet 0     loperamide (IMODIUM A-D) 2 MG tablet Take 2 mg by mouth as needed for diarrhea       LORazepam (ATIVAN) 2 MG/ML concentrated solution Take 2 mg by mouth daily as needed for anxiety       Miconazole Nitrate (LOTRIMIN AF) 2 % AERO Externally apply topically as needed        Multiple Vitamin (DAILY EARL) TABS Take 1 tablet by mouth daily. 30 tablet 5     Omeprazole 20 MG tablet Take 20 mg by mouth daily. (Patient taking differently: Take 20 mg by mouth daily (with dinner) ) 30 tablet 0     Phenylephrine-DM-GG-APAP (SUDAFED PE PRESSURE+PAIN+COLD PO) Take by mouth as needed       polyethylene glycol (MIRALAX/GLYCOLAX) packet Take 1.5 packets by mouth daily       pyrithione zinc (SELSUN BLUE SALON) 1 % SHAM Apply topically daily as needed for irritation        QUEtiapine (SEROQUEL) 25 MG tablet Take 25 mg by mouth 3 times daily as needed       QUEtiapine (SEROQUEL) 50 MG tablet Take 50 mg by mouth At Bedtime       quetiapine (SEROQUEL) 50 MG tablet Take 1 tablet by mouth. 1 tablet po daily (Patient taking differently: Take 12.5 mg by mouth every morning 1 tablet po daily) 30 tablet 0     senna-docusate (SENNA PLUS) 8.6-50 MG per tablet Take 1 tablet by mouth 2 times daily. 60 tablet 0     sodium chloride (OCEAN) 0.65 % nasal spray Spray 1 spray into both nostrils as needed for congestion       Sodium Phosphates (FLEET ENEMA RE) Place rectally as needed       SPORT SUNSCREEN SPF15 EX Externally apply topically as needed       TIZANidine (ZANAFLEX) 2 MG tablet Take 2 tablets by mouth. Twice daily (Patient taking differently: Take 4 mg by mouth daily Twice daily) 120 tablet 0     Glycerin,  Laxative, (FLEET LIQUID GLYCERIN SUPP RE) Place 2 g rectally as needed (Patient not taking: Reported on 10/26/2021)       guaiFENesin (ROBITUSSIN MUCUS+CHEST CONGEST) 100 MG/5ML LIQD Take 10 mLs by mouth every 4 hours as needed for cough (Patient not taking: Reported on 10/26/2021)       hydrocortisone (CORTAID) 1 % cream Apply topically as needed (Patient not taking: Reported on 9/28/2021)       Magnesium Hydroxide (MILK OF MAGNESIA PO) Take by mouth as needed (Patient not taking: Reported on 10/26/2021)         Allergies   Allergen Reactions     Aloe      Depakote [Valproic Acid]      Hydrocodone      Vicodin [Hydrocodone-Acetaminophen]      Social History     Socioeconomic History     Marital status: Single     Spouse name: Not on file     Number of children: Not on file     Years of education: Not on file     Highest education level: Not on file   Occupational History     Not on file   Tobacco Use     Smoking status: Never Smoker     Smokeless tobacco: Never Used   Substance and Sexual Activity     Alcohol use: No     Alcohol/week: 0.0 standard drinks     Drug use: No     Sexual activity: Not on file   Other Topics Concern     Not on file   Social History Narrative     Not on file     Social Determinants of Health     Financial Resource Strain: Not on file   Food Insecurity: Not on file   Transportation Needs: Not on file   Physical Activity: Not on file   Stress: Not on file   Social Connections: Not on file   Intimate Partner Violence: Not on file   Housing Stability: Not on file       The following portions of the patient's history were reviewed and updated as appropriate: allergies, current medications, past family history, past medical history, past social history, past surgical history and problem list.    Review of Systems  A comprehensive review of systems was negative except for what is noted above    Mental Status Exam  Alertness: The patient was alert and smiling.  Appearance: No pain or shortness of  breath.  He maintained eye contact and appeared interested.  Behavior/Demeanor: Occasionally frustrated with him directed  Review patient was throughout.  Speech: The patient was mute with me but does shake and nod his head appropriately to simple direct motions  Psychomotor: Less slowed.  Mood: Smiling.  No obvious depression or anxiety.  No dov.  Affect: Fairly broad.  Not labile.  Thought Process/Associations: Not loose slow.  Not disorganized.  Still concrete  Thought Content: No psychosis  Perception: No changes  Insight: Baseline impaired.  Judgment: Baseline impaired  Attention/Concentration: Needing structure and prompts.  [place].  Language: Baseline impaired  Fund of Knowledge:  .    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 They agree to call or return sooner with any questions or concerns.  Risks and benefits were discussed.  Continue with his individual therapist.     Continue with the support of the clinic, reassurance, and redirection. Staff monitoring and ongoing assessments per team plan. Current psychotropic medication appears to represent the minimum effective dosage and appears medically necessary. We will continue to monitor and reassess. This team will utilize appropriate emergency services if necessary. I will make myself available if concerns or problems arise.    Total time spent with the patient today was 25 minutes with greater than 50% of the time spent in counseling and care coordination. The patient agrees to call before then with any questions, concerns or problems. We will assess for the appropriateness of possible psychotropic medication trials/changes. The patient will seek out appropriate emergency services should that  become necessary.    Video Visit Details    Type of service: Video Visit    Video Start Time: 10 AM    Video End Time: 10:20 AM    Total time for video call: 20 minutes    Originating Location: Patient's home    Distant Location:  [facility]    Mode of Communication: Video call via [employer]      Total time for interview with patient, family as well as chart review, coordination of care and documentation is 25 minutes.    Patient Instructions   It was nice speaking with you today for our office video visit. The following is a summary of our visit.    General Information:    If lab work was done today as part of your evaluation you will generally be contacted via My Chart, mail, or phone with the results within 1-5 days. If there is an alarming result we will contact you by phone. Lab results come back at varying times, I generally wait until all labs are resulted before making comments on results. Please note labs are automatically released to My Chart once available.     If you need refills please contact your pharmacist. They will send a refill request to me to review. Please allow 3 business days for us to process all refill requests.     Please call or send a medical message through My Chart, with any questions or concerns.    If you need any paperwork completed please fax forms to [phone]. Please state if you would like a copy of the completed paperwork, mailed or faxed back to the patient and a fax number to fax the paperwork to. Please allow up to 10 business days for paperwork to be completed.    Robin Parker MD

## 2022-02-01 NOTE — LETTER
"    2/1/2022         RE: Robin Blum  [id number] [facility] [id number]        Dear Colleague,    Thank you for referring your patient, Robin Blum, to the [facility]. Please see a copy of my visit note below.    Robin Blum is a 36 year old male who is being evaluated via a billable video visit in light of the ongoing global health crisis (COVID-19) that requires us to abide by social distancing mandates in order to reduce the risk of COVID-19 exposure.       The patient has been notified of following:     \"This video visit will be conducted via a video call between you and your physician/provider. We have found that certain health care needs can be provided without the need for a physical exam.  This service lets us provide the care you need with a short phone/video conversation.  If a prescription is necessary we can send it directly to your pharmacy.  If lab work is needed we can place an order for that and you can then stop by our lab to have the test done at a later time.    If during the course of the call the physician/provider feels a video visit is not appropriate, you will not be charged for this service.\"     Patient has given verbal consent to a video visit? Yes    Consent was obtained for this service by one of our care team members      Any new medication (other provider):   No   Meds started at last appointment:  No   Results: N/A  Meds increased/decreased at last appointment:    No   Results: N/A     Patient's impression of how medication is working? working good      Compliant with Medication? Yes       Side Effects: No   Pain: No   Appetite change No    Sleep disturbance No   Change in energy No   Change in interest No   Change in concentration No    Psychosis/Hallucinations No   Negative thoughts Yes   Mood swings Yes   Alcohol use No   Drug use No   Anxiety Yes    Sad/depressed mood Yes     Questions or concerns?: No                                 "                          Phone Start Time: 9:40 am    Phone End Time:  9:45 am    Total time of phone conversation: 5 minutes    There were no vitals filed for this visit.    Patient would like the video invitation sent by: [employer]  Number/e-mail address: [phone]     NARAYAN Meyer    Outpatient Follow up TBI Evaluation     Pertinent History: The patient was referred to this clinic for further assessment and treatment .The patient has a history of significant cognitive mood and behavioral difficulties related to a prior traumatic brain injury.  After my April 2016 visit he had been followed by the nurse practitioner but reestablish contact with my clinic in October 2018.     Patient last saw the nurse practitioner and June 2018.  The patient was a bit more sedated at that time following a recent change in his Seroquel to extended release due to some increased agitation.  At that visit low-dose daytime Seroquel was added.     Patient is followed by therapist Ivet Rizo and last saw the therapist September 2018.  He moved to a new group home in 2018 and seemed to like that.  He seemed to be doing fairly well at that time.     I saw the patient in October 2018.  The patient was doing relatively well at that time and I did not make any psychotropic medication changes.     I saw the patient in January 2019.  At that time he was communicating a bit more and using gestures.  He continued to be frustrated with occasional conflict with caregivers.  We did not make any medication changes at that time.     I saw the patient in August 2019.  I met with the patient's mother and father for 20 minutes prior to that appointment.  They had some concerns about the patient's presentation and his cares.  They felt he was spending too much time on the iPad.  We did address this with the group home staff.  We made no medication changes at that time.  The patient moved back home with his mom and dad on October 30.  That went  quite well for a week or so but then the patient began showing more behavioral issues.  Shortly before the November 2019 meeting I did increase the patient's PRN Seroquel in the context of been increased behavioral issues and refusals of multiple medications.     I saw the patient in November 2019.  The patient had been at his current group home for about and was having some behaviors possibly related to adjustment.  The PRN Seroquel had been somewhat helpful.  There was questions as to whether the patient could benefit from a behavioral analyst and they were going to pursue that as well.      I saw the patient in November 2019. He was living in his parents house at that time. He was doing fairly well but had occasional outbursts. Oftentimes these were directed towards his father in frustration. They were starting with a behavioral analyst to begin working on those issues.     I saw the patient in September 2020.  He was doing relatively well at that time and tolerating them change in group homes.  We continued his medication regime.     The patient in March 2020.  He was doing fairly well at that time and was attending speech therapy which was reportedly helpful.  We elected to continue with his current treatment plan at that time and did not make any changes.     Saw the patient in June 2021.  He continued to make progress in therapies.  He was becoming stronger and was dressing himself.  He still had periods where he was upset and refused cares.  He was working with the behavioral analyst and that seem to be helping.  We did not make any medication changes at that visit.    The patient was seen in October 2021.  I also interviewed his mother and father at that time.  The patient was isolating a bit but was getting back on a more regular schedule.  We had considered an increase in the Pristiq.  He was tolerating the medication.  We continue with the current treatment plan at that time and did not make any  changes.     HPI:  Patient presents today for the purposes of medication management.   I had an opportunity to interview the patient as well as his father and mother and his caregiver.  All reported Calixto was doing quite well and seem to be tolerating the current treatment plan.  They had no specific concerns or complaints.    The patient has been sleeping fairly well.  The family is trying to set limits regarding his access to screen time and that seems to be helping.  They have been able to lock some about passwords so the patient does not have unlimited access to the computer and that seems to be helping with his ability to participate in things and with his behavior.  There is been no evidence of any significant depression.  No anxiety.  No significant agitation.  He will get frustrated at times but if they do give him some space he tends to calm down quickly and can reengage in activities.    No change in cognition.  No new health problems or concerns.  No obvious side effects to the medication.  He has been vaccinated against coronavirus and tolerated that well.  We did discuss treatment options and the family and patient would like to continue with the current medications as ordered.     We discussed some treatment options and have elected to continue with the current medications.      Current Medications: Please see chart. Medications personally reviewed.     Patient Active Problem List    Diagnosis Date Noted     Acute encephalopathy 02/05/2015     Priority: Medium     TBI (traumatic brain injury) (H) 11/06/2011     Priority: Medium     Therapeutic drug monitoring 04/22/2011     Priority: Medium     Attention deficit hyperactivity disorder (ADHD) 04/22/2011     Priority: Medium     Problem list name updated by automated process. Provider to review       Constipation      Priority: Medium     Problem list name updated by automated process. Provider to review       Paraplegia (H)      Priority: Medium      Depressive disorder, not elsewhere classified      Priority: Medium     Personal history of traumatic brain injury      Priority: Medium     Esophageal reflux      Priority: Medium     Myoclonus      Priority: Medium     Aphasia      Priority: Medium     Past Medical History:   Diagnosis Date     Aphasia      Attention deficit disorder with hyperactivity(314.01)      Depressive disorder, not elsewhere classified      Esophageal reflux      Myoclonus      OCD (obsessive compulsive disorder)      Other, mixed, or unspecified nondependent drug abuse, in remission      Paraplegia (H)      Personal history of traumatic brain injury      Unspecified constipation      No past surgical history on file.  Family History   Problem Relation Age of Onset     Coronary Artery Disease Father      Hyperlipidemia Father      Depression/Anxiety Paternal Grandfather         Alcohol abuse     Current Outpatient Medications   Medication Sig Dispense Refill     Acetaminophen 500 MG CAPS Take 1 tablet by mouth every 4 hours as needed. (Patient taking differently: Take 1-2 tablets by mouth every 4 hours as needed ) 100 capsule 0     alum & mag hydroxide-simethicone (MYLANTA ES/MAALOX  ES) 400-400-40 MG/5ML SUSP Take 10-20 mLs by mouth every 4 hours as needed for indigestion       ascorbic acid (VITAMIN C) 250 MG CHEW Take 1 tablet by mouth daily. (Patient taking differently: Take 250 mg by mouth daily (with lunch) ) 30 tablet 0     calcium polycarbophil (FIBERCON) 625 MG tablet Take 1 tablet by mouth daily (with lunch)       cefUROXime (CEFTIN) 500 MG tablet Take 1 tablet (500 mg) by mouth 2 times daily 20 tablet 0     Cholecalciferol (VITAMIN D3 PO) Take 400 Units by mouth daily (with lunch)       Cholecalciferol (VITAMIN D3) 50 MCG (2000 UT) CAPS 2,000 Units 2 times daily        ClonazePAM (KLONOPIN PO) Take 1 mg by mouth 4 times daily 08/12/16/22       clonazePAM (KLONOPIN) 1 MG tablet TAKE ONE TABLET BY MOUTH FOUR TIMES DAILY        clotrimazole (LOTRIMIN) 1 % cream Apply topically as needed       Desvenlafaxine Succinate (PRISTIQ PO) Take 100 mg by mouth daily (with lunch) 1200 noon       DOCUSATE SODIUM PO Take 200 mg by mouth At Bedtime       FIBER PO Take 1 tablet by mouth daily       fluticasone (VERAMYST) 27.5 MCG/SPRAY spray Spray 2 sprays into both nostrils daily       IBUPROFEN PO Take 200-400 mg by mouth every 4 hours as needed for moderate pain       lamoTRIgine (LAMICTAL ODT) 100 MG TBDP Take 1 tablet (100 mg) by mouth daily (Patient taking differently: Take 150 mg by mouth 2 times daily ) 30 tablet 0     loperamide (IMODIUM A-D) 2 MG tablet Take 2 mg by mouth as needed for diarrhea       LORazepam (ATIVAN) 2 MG/ML concentrated solution Take 2 mg by mouth daily as needed for anxiety       Miconazole Nitrate (LOTRIMIN AF) 2 % AERO Externally apply topically as needed        Multiple Vitamin (DAILY EARL) TABS Take 1 tablet by mouth daily. 30 tablet 5     Omeprazole 20 MG tablet Take 20 mg by mouth daily. (Patient taking differently: Take 20 mg by mouth daily (with dinner) ) 30 tablet 0     Phenylephrine-DM-GG-APAP (SUDAFED PE PRESSURE+PAIN+COLD PO) Take by mouth as needed       polyethylene glycol (MIRALAX/GLYCOLAX) packet Take 1.5 packets by mouth daily       pyrithione zinc (SELSUN BLUE SALON) 1 % SHAM Apply topically daily as needed for irritation        QUEtiapine (SEROQUEL) 25 MG tablet Take 25 mg by mouth 3 times daily as needed       QUEtiapine (SEROQUEL) 50 MG tablet Take 50 mg by mouth At Bedtime       quetiapine (SEROQUEL) 50 MG tablet Take 1 tablet by mouth. 1 tablet po daily (Patient taking differently: Take 12.5 mg by mouth every morning 1 tablet po daily) 30 tablet 0     senna-docusate (SENNA PLUS) 8.6-50 MG per tablet Take 1 tablet by mouth 2 times daily. 60 tablet 0     sodium chloride (OCEAN) 0.65 % nasal spray Spray 1 spray into both nostrils as needed for congestion       Sodium Phosphates (FLEET ENEMA RE) Place  rectally as needed       SPORT SUNSCREEN SPF15 EX Externally apply topically as needed       TIZANidine (ZANAFLEX) 2 MG tablet Take 2 tablets by mouth. Twice daily (Patient taking differently: Take 4 mg by mouth daily Twice daily) 120 tablet 0     Glycerin, Laxative, (FLEET LIQUID GLYCERIN SUPP RE) Place 2 g rectally as needed (Patient not taking: Reported on 10/26/2021)       guaiFENesin (ROBITUSSIN MUCUS+CHEST CONGEST) 100 MG/5ML LIQD Take 10 mLs by mouth every 4 hours as needed for cough (Patient not taking: Reported on 10/26/2021)       hydrocortisone (CORTAID) 1 % cream Apply topically as needed (Patient not taking: Reported on 9/28/2021)       Magnesium Hydroxide (MILK OF MAGNESIA PO) Take by mouth as needed (Patient not taking: Reported on 10/26/2021)         Allergies   Allergen Reactions     Aloe      Depakote [Valproic Acid]      Hydrocodone      Vicodin [Hydrocodone-Acetaminophen]      Social History     Socioeconomic History     Marital status: Single     Spouse name: Not on file     Number of children: Not on file     Years of education: Not on file     Highest education level: Not on file   Occupational History     Not on file   Tobacco Use     Smoking status: Never Smoker     Smokeless tobacco: Never Used   Substance and Sexual Activity     Alcohol use: No     Alcohol/week: 0.0 standard drinks     Drug use: No     Sexual activity: Not on file   Other Topics Concern     Not on file   Social History Narrative     Not on file     Social Determinants of Health     Financial Resource Strain: Not on file   Food Insecurity: Not on file   Transportation Needs: Not on file   Physical Activity: Not on file   Stress: Not on file   Social Connections: Not on file   Intimate Partner Violence: Not on file   Housing Stability: Not on file       The following portions of the patient's history were reviewed and updated as appropriate: allergies, current medications, past family history, past medical history, past  social history, past surgical history and problem list.    Review of Systems  A comprehensive review of systems was negative except for what is noted above    Mental Status Exam  Alertness: The patient was alert and smiling.  Appearance: No pain or shortness of breath.  He maintained eye contact and appeared interested.  Behavior/Demeanor: Occasionally frustrated with him directed  Review patient was throughout.  Speech: The patient was mute with me but does shake and nod his head appropriately to simple direct motions  Psychomotor: Less slowed.  Mood: Smiling.  No obvious depression or anxiety.  No dov.  Affect: Fairly broad.  Not labile.  Thought Process/Associations: Not loose slow.  Not disorganized.  Still concrete  Thought Content: No psychosis  Perception: No changes  Insight: Baseline impaired.  Judgment: Baseline impaired  Attention/Concentration: Needing structure and prompts.  [place].  Language: Baseline impaired  Fund of Knowledge:  .    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 They agree to call or return sooner with any questions or concerns.  Risks and benefits were discussed.  Continue with his individual therapist.     Continue with the support of the clinic, reassurance, and redirection. Staff monitoring and ongoing assessments per team plan. Current psychotropic medication appears to represent the minimum effective dosage and appears medically necessary. We will continue to monitor and reassess. This team will utilize appropriate emergency services if necessary. I will make myself available if concerns or problems arise.    Total time spent with the patient today was 25 minutes with greater than 50% of the time spent in counseling and  care coordination. The patient agrees to call before then with any questions, concerns or problems. We will assess for the appropriateness of possible psychotropic medication trials/changes. The patient will seek out appropriate emergency services should that become necessary.    Video Visit Details    Type of service: Video Visit    Video Start Time: 10 AM    Video End Time: 10:20 AM    Total time for video call: 20 minutes    Originating Location: Patient's home    Distant Location:  [facility]    Mode of Communication: Video call via [employer]      Total time for interview with patient, family as well as chart review, coordination of care and documentation is 25 minutes.    Patient Instructions   It was nice speaking with you today for our office video visit. The following is a summary of our visit.    General Information:    If lab work was done today as part of your evaluation you will generally be contacted via My Chart, mail, or phone with the results within 1-5 days. If there is an alarming result we will contact you by phone. Lab results come back at varying times, I generally wait until all labs are resulted before making comments on results. Please note labs are automatically released to My Chart once available.     If you need refills please contact your pharmacist. They will send a refill request to me to review. Please allow 3 business days for us to process all refill requests.     Please call or send a medical message through My Chart, with any questions or concerns.    If you need any paperwork completed please fax forms to [phone]. Please state if you would like a copy of the completed paperwork, mailed or faxed back to the patient and a fax number to fax the paperwork to. Please allow up to 10 business days for paperwork to be completed.    Robin Parker MD        Again, thank you for allowing me to participate in the care of your patient.         Sincerely,        Robin Parker MD

## 2022-05-03 ENCOUNTER — VIRTUAL VISIT (OUTPATIENT)
Dept: NEUROLOGY | Facility: CLINIC | Age: 37
End: 2022-05-03
Payer: MEDICARE

## 2022-05-03 DIAGNOSIS — F06.30 MOOD DISORDER AS LATE EFFECT OF TRAUMATIC BRAIN INJURY (H): Primary | ICD-10-CM

## 2022-05-03 DIAGNOSIS — S06.9XAS MOOD DISORDER AS LATE EFFECT OF TRAUMATIC BRAIN INJURY (H): Primary | ICD-10-CM

## 2022-05-03 PROCEDURE — 99214 OFFICE O/P EST MOD 30 MIN: CPT | Mod: 95 | Performed by: PSYCHIATRY & NEUROLOGY
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
## 2022-05-03 NOTE — NURSING NOTE
Chief Complaint   Patient presents with     Follow Up     Behavior     Less manageable       NARAYAN Meyer on 5/3/2022 at 11:05 AM

## 2022-05-26 NOTE — ED NOTES
is Miriam. Her phone number is [phone]   05/26/2022 doing slightly better today, still on 2-3 L of oxygen, will give calcitonin/zometa for hypercalcemia and lasix as well. Continue antibiotics with ceftriaxone/azithromycin. Blood cx/urine cx pending. IR thoracentesis for left pleural effusion today.   05/27/2022 Thoracentesis done yesterday , 1 liter was removed. Gram stain negative.   Vitals are stable, still on oxygen 2-3 L, labs showing leukocytosis (neutrophilic predominant) and improved calcium down to 12. two Blood cx 5/25 showing Gram positive cocci in clusters resembling Staph with coag neg staph likely contaminant. Started on vancomycin/ceftriaxone overnight.     On further discussion with family today, family/patient decided to pursue hospice. Will be discharged to home hospice. Will switch abx to augmentin for 7 days.

## 2022-08-18 ENCOUNTER — VIRTUAL VISIT (OUTPATIENT)
Dept: NEUROLOGY | Facility: CLINIC | Age: 37
End: 2022-08-18
Payer: MEDICARE

## 2022-08-18 DIAGNOSIS — F06.30 MOOD DISORDER AS LATE EFFECT OF TRAUMATIC BRAIN INJURY (H): Primary | ICD-10-CM

## 2022-08-18 DIAGNOSIS — S06.9XAS MOOD DISORDER AS LATE EFFECT OF TRAUMATIC BRAIN INJURY (H): Primary | ICD-10-CM

## 2022-08-18 PROCEDURE — 99214 OFFICE O/P EST MOD 30 MIN: CPT | Mod: 95 | Performed by: PSYCHIATRY & NEUROLOGY

## 2022-08-18 RX ORDER — DESVENLAFAXINE 25 MG/1
25 TABLET, EXTENDED RELEASE ORAL DAILY
Qty: 30 TABLET | Refills: 3 | Status: SHIPPED | OUTPATIENT
Start: 2022-08-18 | End: 2022-11-30

## 2022-08-18 NOTE — NURSING NOTE
Chief Complaint   Patient presents with     Follow Up       NARAYAN Meyer on 8/18/2022 at 10:17 AM    
r/o sepsis, labs, IV fluids, abx, UA, covid/rvp

## 2022-08-18 NOTE — LETTER
"    8/18/2022         RE: Robin Blum  [address] [facility] [address]        Dear Colleague,    Thank you for referring your patient, Robin Blum, to the [facility]. Please see a copy of my visit note below.    Robin Blum is a 37 year old male who is being evaluated via a billable video visit in light of the ongoing global health crisis (COVID-19) that requires us to abide by social distancing mandates in order to reduce the risk of COVID-19 exposure.       The patient has been notified of following:     \"This video visit will be conducted via a video call between you and your physician/provider. We have found that certain health care needs can be provided without the need for a physical exam.  This service lets us provide the care you need with a short phone/video conversation.  If a prescription is necessary we can send it directly to your pharmacy.  If lab work is needed we can place an order for that and you can then stop by our lab to have the test done at a later time.    If during the course of the call the physician/provider feels a video visit is not appropriate, you will not be charged for this service.\"     Patient has given verbal consent to a video visit? Yes    Consent was obtained for this service by one of our care team members      Any new medication (other provider):   No   Meds started at last appointment:  No   Results: N/A  Meds increased/decreased at last appointment:    No   Results: N/A     Patient's impression of how medication is working? working good      Compliant with Medication? Yes       Side Effects: No   Pain: No   Appetite change No    Sleep disturbance No   Change in energy No   Change in interest No   Change in concentration No    Psychosis/Hallucinations No   Negative thoughts Yes  Mood swings Yes   Alcohol use No   Drug use No   Anxiety Yes    Sad/depressed mood Yes     Questions or concerns?:  No                                "                        Phone Start Time: 10:07 am    Phone End Time: 10:15 am    Total time of phone conversation: 8 minutes    There were no vitals filed for this visit.    Patient would like the video invitation sent by: GELI  Number/e-mail address: [phone]     NARAYAN Meyer    Outpatient Follow up TBI Evaluation     Pertinent History: The patient was referred to this clinic for further assessment and treatment .The patient has a history of significant cognitive mood and behavioral difficulties related to a prior traumatic brain injury.  After my April 2016 visit he had been followed by the nurse practitioner but reestablish contact with my clinic in October 2018.     Patient last saw the nurse practitioner and June 2018.  The patient was a bit more sedated at that time following a recent change in his Seroquel to extended release due to some increased agitation.  At that visit low-dose daytime Seroquel was added.     Patient is followed by therapist Ivet Rizo and last saw the therapist September 2018.  He moved to a new group home in 2018 and seemed to like that.  He seemed to be doing fairly well at that time.     I saw the patient in October 2018.  The patient was doing relatively well at that time and I did not make any psychotropic medication changes.     I saw the patient in January 2019.  At that time he was communicating a bit more and using gestures.  He continued to be frustrated with occasional conflict with caregivers.  We did not make any medication changes at that time.     I saw the patient in August 2019.  I met with the patient's mother and father for 20 minutes prior to that appointment.  They had some concerns about the patient's presentation and his cares.  They felt he was spending too much time on the iPad.  We did address this with the group home staff.  We made no medication changes at that time.  The patient moved back home with his mom and dad on October 30.  That went  quite well for a week or so but then the patient began showing more behavioral issues.  Shortly before the November 2019 meeting I did increase the patient's PRN Seroquel in the context of been increased behavioral issues and refusals of multiple medications.     I saw the patient in November 2019.  The patient had been at his current group home for about and was having some behaviors possibly related to adjustment.  The PRN Seroquel had been somewhat helpful.  There was questions as to whether the patient could benefit from a behavioral analyst and they were going to pursue that as well.      I saw the patient in November 2019. He was living in his parents house at that time. He was doing fairly well but had occasional outbursts. Oftentimes these were directed towards his father in frustration. They were starting with a behavioral analyst to begin working on those issues.     I saw the patient in September 2020.  He was doing relatively well at that time and tolerating them change in group homes.  We continued his medication regime.     The patient in March 2020.  He was doing fairly well at that time and was attending speech therapy which was reportedly helpful.  We elected to continue with his current treatment plan at that time and did not make any changes.     Saw the patient in June 2021.  He continued to make progress in therapies.  He was becoming stronger and was dressing himself.  He still had periods where he was upset and refused cares.  He was working with the behavioral analyst and that seem to be helping.  We did not make any medication changes at that visit.    The patient was seen in October 2021.  I also interviewed his mother and father at that time.  The patient was isolating a bit but was getting back on a more regular schedule.  We had considered an increase in the Pristiq.  He was tolerating the medication.  We continue with the current treatment plan at that time and did not make any  changes.    The patient was seen in early February 2022.  He was doing well at that time and they were trying to set some limits on screen time which seemed to be associated with some behavioral outbursts.  He was sleeping well and medically stable.  We continued with the treatment plan at that time.    The patient was seen in May 2022.  He was doing relatively well at that time but continued with occasional irritability and probable underlying depression.  We talked about possibly increasing the Pristiq but the family wanted to continue with the current treatment plan and we did not make changes.     HPI:  Patient presents today for the purposes of medication management.   The patient was present but did not participate much.  He told me he thought he was doing well and he told me he enjoyed playing [id number] with his father.    The patient's father reported that the patient continues to make very good improvements cognitively.  He is now playing board games and card games but he is frustrated by some of his physical challenges.  He is working with a behavioral analyst and they are trying to set goals.  We spent some time talking about trying to set reasonable goals and give the patient a feeling of accomplishment.  He has been through a variety of different therapies in the past.  They report that the patient is making gains cognitively and he is using his iPad and engaged in some cognitive training episodes and that seems to be helpful.  No change in sleep or appetite.  No hallucinations or delusions.  No suicidality.  No new medical issues or concerns and no side effects to the medication.     We discussed some treatment options and have elected to increase his Pristiq to 125 mg a day..      Current Medications: Please see chart. Medications personally reviewed.     Patient Active Problem List    Diagnosis Date Noted     Acute encephalopathy 02/05/2015     Priority: Medium     TBI (traumatic brain injury) (H)  11/06/2011     Priority: Medium     Therapeutic drug monitoring 04/22/2011     Priority: Medium     Attention deficit hyperactivity disorder (ADHD) 04/22/2011     Priority: Medium     Problem list name updated by automated process. Provider to review       Constipation      Priority: Medium     Problem list name updated by automated process. Provider to review       Paraplegia (H)      Priority: Medium     Depressive disorder, not elsewhere classified      Priority: Medium     Personal history of traumatic brain injury      Priority: Medium     Esophageal reflux      Priority: Medium     Myoclonus      Priority: Medium     Aphasia      Priority: Medium     Past Medical History:   Diagnosis Date     Aphasia      Attention deficit disorder with hyperactivity(314.01)      Depressive disorder, not elsewhere classified      Esophageal reflux      Myoclonus      OCD (obsessive compulsive disorder)      Other, mixed, or unspecified nondependent drug abuse, in remission      Paraplegia (H)      Personal history of traumatic brain injury      Unspecified constipation      No past surgical history on file.  Family History   Problem Relation Age of Onset     Coronary Artery Disease Father      Hyperlipidemia Father      Depression/Anxiety Paternal Grandfather         Alcohol abuse     Current Outpatient Medications   Medication Sig Dispense Refill     Acetaminophen 500 MG CAPS Take 1 tablet by mouth every 4 hours as needed. (Patient taking differently: Take 1-2 tablets by mouth every 4 hours as needed) 100 capsule 0     alum & mag hydroxide-simethicone (MYLANTA ES/MAALOX  ES) 400-400-40 MG/5ML SUSP Take 10-20 mLs by mouth every 4 hours as needed for indigestion       Cholecalciferol (VITAMIN D3 PO) Take 400 Units by mouth daily (with lunch)       Cholecalciferol (VITAMIN D3) 50 MCG (2000 UT) CAPS 2,000 Units 2 times daily        ClonazePAM (KLONOPIN PO) Take 1 mg by mouth 4 times daily 08/12/16/22       clonazePAM (KLONOPIN) 1  MG tablet TAKE ONE TABLET BY MOUTH FOUR TIMES DAILY       Desvenlafaxine Succinate (PRISTIQ PO) Take 100 mg by mouth daily (with lunch) 1200 noon       DOCUSATE SODIUM PO Take 200 mg by mouth At Bedtime       FIBER PO Take 1 tablet by mouth daily       fluticasone (VERAMYST) 27.5 MCG/SPRAY spray Spray 2 sprays into both nostrils daily       IBUPROFEN PO Take 200-400 mg by mouth every 4 hours as needed for moderate pain       lamoTRIgine (LAMICTAL ODT) 100 MG TBDP Take 1 tablet (100 mg) by mouth daily (Patient taking differently: Take 150 mg by mouth 2 times daily) 30 tablet 0     loperamide (IMODIUM A-D) 2 MG tablet Take 2 mg by mouth as needed for diarrhea       LORazepam (ATIVAN) 2 MG/ML concentrated solution Take 2 mg by mouth daily as needed for anxiety       Multiple Vitamin (DAILY EARL) TABS Take 1 tablet by mouth daily. 30 tablet 5     Phenylephrine-DM-GG-APAP (SUDAFED PE PRESSURE+PAIN+COLD PO) Take by mouth as needed       polyethylene glycol (MIRALAX/GLYCOLAX) packet Take 1.5 packets by mouth daily       QUEtiapine (SEROQUEL) 25 MG tablet Take 25 mg by mouth 3 times daily as needed       QUEtiapine (SEROQUEL) 50 MG tablet Take 50 mg by mouth At Bedtime       quetiapine (SEROQUEL) 50 MG tablet Take 1 tablet by mouth. 1 tablet po daily (Patient taking differently: Take 12.5 mg by mouth every morning 1 tablet po daily) 30 tablet 0     senna-docusate (SENNA PLUS) 8.6-50 MG per tablet Take 1 tablet by mouth 2 times daily. 60 tablet 0     sodium chloride (OCEAN) 0.65 % nasal spray Spray 1 spray into both nostrils as needed for congestion       Sodium Phosphates (FLEET ENEMA RE) Place rectally as needed       SPORT SUNSCREEN SPF15 EX Externally apply topically as needed       TIZANidine (ZANAFLEX) 2 MG tablet Take 2 tablets by mouth. Twice daily (Patient taking differently: Take 4 mg by mouth daily Twice daily) 120 tablet 0       Allergies   Allergen Reactions     Aloe      Depakote [Valproic Acid]       Hydrocodone      Vicodin [Hydrocodone-Acetaminophen]      Social History     Socioeconomic History     Marital status: Single     Spouse name: Not on file     Number of children: Not on file     Years of education: Not on file     Highest education level: Not on file   Occupational History     Not on file   Tobacco Use     Smoking status: Never Smoker     Smokeless tobacco: Never Used   Substance and Sexual Activity     Alcohol use: No     Alcohol/week: 0.0 standard drinks     Drug use: No     Sexual activity: Not on file   Other Topics Concern     Not on file   Social History Narrative     Not on file     Social Determinants of Health     Financial Resource Strain: Not on file   Food Insecurity: Not on file   Transportation Needs: Not on file   Physical Activity: Not on file   Stress: Not on file   Social Connections: Not on file   Intimate Partner Violence: Not on file   Housing Stability: Not on file       The following portions of the patient's history were reviewed and updated as appropriate: allergies, current medications, past family history, past medical history, past social history, past surgical history and problem list.    Review of Systems  A comprehensive review of systems was negative except for what is noted above    Mental Status Exam  Alertness: The patient needed prompts but seemed to be attending and tracking adequately.  Appearance: No pain or shortness of breath.  No recent changes.  There are no new asymmetries.  No new tremors.  Behavior/Demeanor: He continues to be irritable at times and becomes frustrated but he is participating more and making gains cognitively.  Speech: Vague slow and limited in his ability to participate.  Answers are consistent however.  Psychomotor:   Reportedly participating a bit better.  Still overall slow.  Mood: Currently with no obvious depression or anxiety.  No reports of any dov.  Affect: Baseline restricted.  Occasional frustration and irritability.  Thought  Process/Associations: Not loose slow.  Not disorganized.  Still concrete.  No recent change.  Thought Content: No psychosis  Perception: No changes  Insight: Baseline impaired.  Judgment: Baseline impaired  Attention/Concentration: Needing structure and prompts.  [place].  Language: Baseline impaired  Fund of Knowledge:  .    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 They agree to call or return sooner with any questions or concerns.  Risks and benefits were discussed.  Continue with his individual therapist.     Continue with the support of the clinic, reassurance, and redirection. Staff monitoring and ongoing assessments per team plan. Current psychotropic medication appears to represent the minimum effective dosage and appears medically necessary. We will continue to monitor and reassess. This team will utilize appropriate emergency services if necessary. I will make myself available if concerns or problems arise.    Total time spent with the patient today was 28 minutes with greater than 50% of the time spent in counseling and care coordination. The patient agrees to call before then with any questions, concerns or problems. We will assess for the appropriateness of possible psychotropic medication trials/changes. The patient will seek out appropriate emergency services should that become necessary.    Video Visit Details    Type of service: Video Visit    Video Start Time: 10:20  AM    Video End Time: 10:42 AM    Total time for video call: 22 minutes    Originating Location: Patient's home    Distant Location:  [facility] [place]    Mode of Communication: Video call via [place]      Total time for interview with patient, family as  well as chart review, coordination of care and documentation is 27 minutes.    Patient Instructions   It was nice speaking with you today for our office video visit. The following is a summary of our visit.    General Information:    If lab work was done today as part of your evaluation you will generally be contacted via My Chart, mail, or phone with the results within 1-5 days. If there is an alarming result we will contact you by phone. Lab results come back at varying times, I generally wait until all labs are resulted before making comments on results. Please note labs are automatically released to My Chart once available.     If you need refills please contact your pharmacist. They will send a refill request to me to review. Please allow 3 business days for us to process all refill requests.     Please call or send a medical message through My Chart, with any questions or concerns.    If you need any paperwork completed please fax forms to [phone]. Please state if you would like a copy of the completed paperwork, mailed or faxed back to the patient and a fax number to fax the paperwork to. Please allow up to 10 business days for paperwork to be completed.    Robin Parker MD        Again, thank you for allowing me to participate in the care of your patient.        Sincerely,        Robin Parker MD

## 2022-08-18 NOTE — PROGRESS NOTES
"Robin Blum is a 37 year old male who is being evaluated via a billable video visit in light of the ongoing global health crisis (COVID-19) that requires us to abide by social distancing mandates in order to reduce the risk of COVID-19 exposure.       The patient has been notified of following:     \"This video visit will be conducted via a video call between you and your physician/provider. We have found that certain health care needs can be provided without the need for a physical exam.  This service lets us provide the care you need with a short phone/video conversation.  If a prescription is necessary we can send it directly to your pharmacy.  If lab work is needed we can place an order for that and you can then stop by our lab to have the test done at a later time.    If during the course of the call the physician/provider feels a video visit is not appropriate, you will not be charged for this service.\"     Patient has given verbal consent to a video visit? Yes    Consent was obtained for this service by one of our care team members      Any new medication (other provider):   No   Meds started at last appointment:  No   Results: N/A  Meds increased/decreased at last appointment:    No   Results: N/A     Patient's impression of how medication is working? working good      Compliant with Medication? Yes       Side Effects: No   Pain: No   Appetite change No    Sleep disturbance No   Change in energy No   Change in interest No   Change in concentration No    Psychosis/Hallucinations No   Negative thoughts Yes  Mood swings Yes   Alcohol use No   Drug use No   Anxiety Yes    Sad/depressed mood Yes     Questions or concerns?:  No                                                       Phone Start Time: 10:07 am    Phone End Time: 10:15 am    Total time of phone conversation: 8 minutes    There were no vitals filed for this visit.    Patient would like the video invitation sent by: [employer]  Number/e-mail address: " [phone]     NARAYAN Meyer    Outpatient Follow up TBI Evaluation     Pertinent History: The patient was referred to this clinic for further assessment and treatment .The patient has a history of significant cognitive mood and behavioral difficulties related to a prior traumatic brain injury.  After my April 2016 visit he had been followed by the nurse practitioner but reestablish contact with my clinic in October 2018.     Patient last saw the nurse practitioner and June 2018.  The patient was a bit more sedated at that time following a recent change in his Seroquel to extended release due to some increased agitation.  At that visit low-dose daytime Seroquel was added.     Patient is followed by therapist Ivet Rizo and last saw the therapist September 2018.  He moved to a new group home in 2018 and seemed to like that.  He seemed to be doing fairly well at that time.     I saw the patient in October 2018.  The patient was doing relatively well at that time and I did not make any psychotropic medication changes.     I saw the patient in January 2019.  At that time he was communicating a bit more and using gestures.  He continued to be frustrated with occasional conflict with caregivers.  We did not make any medication changes at that time.     I saw the patient in August 2019.  I met with the patient's mother and father for 20 minutes prior to that appointment.  They had some concerns about the patient's presentation and his cares.  They felt he was spending too much time on the iPad.  We did address this with the group home staff.  We made no medication changes at that time.  The patient moved back home with his mom and dad on October 30.  That went quite well for a week or so but then the patient began showing more behavioral issues.  Shortly before the November 2019 meeting I did increase the patient's PRN Seroquel in the context of been increased behavioral issues and refusals of multiple  medications.     I saw the patient in November 2019.  The patient had been at his current group home for about and was having some behaviors possibly related to adjustment.  The PRN Seroquel had been somewhat helpful.  There was questions as to whether the patient could benefit from a behavioral analyst and they were going to pursue that as well.      I saw the patient in November 2019. He was living in his parents house at that time. He was doing fairly well but had occasional outbursts. Oftentimes these were directed towards his father in frustration. They were starting with a behavioral analyst to begin working on those issues.     I saw the patient in September 2020.  He was doing relatively well at that time and tolerating them change in group homes.  We continued his medication regime.     The patient in March 2020.  He was doing fairly well at that time and was attending speech therapy which was reportedly helpful.  We elected to continue with his current treatment plan at that time and did not make any changes.     Saw the patient in June 2021.  He continued to make progress in therapies.  He was becoming stronger and was dressing himself.  He still had periods where he was upset and refused cares.  He was working with the behavioral analyst and that seem to be helping.  We did not make any medication changes at that visit.    The patient was seen in October 2021.  I also interviewed his mother and father at that time.  The patient was isolating a bit but was getting back on a more regular schedule.  We had considered an increase in the Pristiq.  He was tolerating the medication.  We continue with the current treatment plan at that time and did not make any changes.    The patient was seen in early February 2022.  He was doing well at that time and they were trying to set some limits on screen time which seemed to be associated with some behavioral outbursts.  He was sleeping well and medically stable.  We  continued with the treatment plan at that time.    The patient was seen in May 2022.  He was doing relatively well at that time but continued with occasional irritability and probable underlying depression.  We talked about possibly increasing the Pristiq but the family wanted to continue with the current treatment plan and we did not make changes.     HPI:  Patient presents today for the purposes of medication management.   The patient was present but did not participate much.  He told me he thought he was doing well and he told me he enjoyed playing chess with his father.    The patient's father reported that the patient continues to make very good improvements cognitively.  He is now playing board games and card games but he is frustrated by some of his physical challenges.  He is working with a behavioral analyst and they are trying to set goals.  We spent some time talking about trying to set reasonable goals and give the patient a feeling of accomplishment.  He has been through a variety of different therapies in the past.  They report that the patient is making gains cognitively and he is using his iPad and engaged in some cognitive training episodes and that seems to be helpful.  No change in sleep or appetite.  No hallucinations or delusions.  No suicidality.  No new medical issues or concerns and no side effects to the medication.     We discussed some treatment options and have elected to increase his Pristiq to 125 mg a day..      Current Medications: Please see chart. Medications personally reviewed.     Patient Active Problem List    Diagnosis Date Noted     Acute encephalopathy 02/05/2015     Priority: Medium     TBI (traumatic brain injury) (H) 11/06/2011     Priority: Medium     Therapeutic drug monitoring 04/22/2011     Priority: Medium     Attention deficit hyperactivity disorder (ADHD) 04/22/2011     Priority: Medium     Problem list name updated by automated process. Provider to review        Constipation      Priority: Medium     Problem list name updated by automated process. Provider to review       Paraplegia (H)      Priority: Medium     Depressive disorder, not elsewhere classified      Priority: Medium     Personal history of traumatic brain injury      Priority: Medium     Esophageal reflux      Priority: Medium     Myoclonus      Priority: Medium     Aphasia      Priority: Medium     Past Medical History:   Diagnosis Date     Aphasia      Attention deficit disorder with hyperactivity(314.01)      Depressive disorder, not elsewhere classified      Esophageal reflux      Myoclonus      OCD (obsessive compulsive disorder)      Other, mixed, or unspecified nondependent drug abuse, in remission      Paraplegia (H)      Personal history of traumatic brain injury      Unspecified constipation      No past surgical history on file.  Family History   Problem Relation Age of Onset     Coronary Artery Disease Father      Hyperlipidemia Father      Depression/Anxiety Paternal Grandfather         Alcohol abuse     Current Outpatient Medications   Medication Sig Dispense Refill     Acetaminophen 500 MG CAPS Take 1 tablet by mouth every 4 hours as needed. (Patient taking differently: Take 1-2 tablets by mouth every 4 hours as needed) 100 capsule 0     alum & mag hydroxide-simethicone (MYLANTA ES/MAALOX  ES) 400-400-40 MG/5ML SUSP Take 10-20 mLs by mouth every 4 hours as needed for indigestion       Cholecalciferol (VITAMIN D3 PO) Take 400 Units by mouth daily (with lunch)       Cholecalciferol (VITAMIN D3) 50 MCG (2000 UT) CAPS 2,000 Units 2 times daily        ClonazePAM (KLONOPIN PO) Take 1 mg by mouth 4 times daily 08/12/16/22       clonazePAM (KLONOPIN) 1 MG tablet TAKE ONE TABLET BY MOUTH FOUR TIMES DAILY       Desvenlafaxine Succinate (PRISTIQ PO) Take 100 mg by mouth daily (with lunch) 1200 noon       DOCUSATE SODIUM PO Take 200 mg by mouth At Bedtime       FIBER PO Take 1 tablet by mouth daily        fluticasone (VERAMYST) 27.5 MCG/SPRAY spray Spray 2 sprays into both nostrils daily       IBUPROFEN PO Take 200-400 mg by mouth every 4 hours as needed for moderate pain       lamoTRIgine (LAMICTAL ODT) 100 MG TBDP Take 1 tablet (100 mg) by mouth daily (Patient taking differently: Take 150 mg by mouth 2 times daily) 30 tablet 0     loperamide (IMODIUM A-D) 2 MG tablet Take 2 mg by mouth as needed for diarrhea       LORazepam (ATIVAN) 2 MG/ML concentrated solution Take 2 mg by mouth daily as needed for anxiety       Multiple Vitamin (DAILY EARL) TABS Take 1 tablet by mouth daily. 30 tablet 5     Phenylephrine-DM-GG-APAP (SUDAFED PE PRESSURE+PAIN+COLD PO) Take by mouth as needed       polyethylene glycol (MIRALAX/GLYCOLAX) packet Take 1.5 packets by mouth daily       QUEtiapine (SEROQUEL) 25 MG tablet Take 25 mg by mouth 3 times daily as needed       QUEtiapine (SEROQUEL) 50 MG tablet Take 50 mg by mouth At Bedtime       quetiapine (SEROQUEL) 50 MG tablet Take 1 tablet by mouth. 1 tablet po daily (Patient taking differently: Take 12.5 mg by mouth every morning 1 tablet po daily) 30 tablet 0     senna-docusate (SENNA PLUS) 8.6-50 MG per tablet Take 1 tablet by mouth 2 times daily. 60 tablet 0     sodium chloride (OCEAN) 0.65 % nasal spray Spray 1 spray into both nostrils as needed for congestion       Sodium Phosphates (FLEET ENEMA RE) Place rectally as needed       SPORT SUNSCREEN SPF15 EX Externally apply topically as needed       TIZANidine (ZANAFLEX) 2 MG tablet Take 2 tablets by mouth. Twice daily (Patient taking differently: Take 4 mg by mouth daily Twice daily) 120 tablet 0       Allergies   Allergen Reactions     Aloe      Depakote [Valproic Acid]      Hydrocodone      Vicodin [Hydrocodone-Acetaminophen]      Social History     Socioeconomic History     Marital status: Single     Spouse name: Not on file     Number of children: Not on file     Years of education: Not on file     Highest education level: Not on  file   Occupational History     Not on file   Tobacco Use     Smoking status: Never Smoker     Smokeless tobacco: Never Used   Substance and Sexual Activity     Alcohol use: No     Alcohol/week: 0.0 standard drinks     Drug use: No     Sexual activity: Not on file   Other Topics Concern     Not on file   Social History Narrative     Not on file     Social Determinants of Health     Financial Resource Strain: Not on file   Food Insecurity: Not on file   Transportation Needs: Not on file   Physical Activity: Not on file   Stress: Not on file   Social Connections: Not on file   Intimate Partner Violence: Not on file   Housing Stability: Not on file       The following portions of the patient's history were reviewed and updated as appropriate: allergies, current medications, past family history, past medical history, past social history, past surgical history and problem list.    Review of Systems  A comprehensive review of systems was negative except for what is noted above    Mental Status Exam  Alertness: The patient needed prompts but seemed to be attending and tracking adequately.  Appearance: No pain or shortness of breath.  No recent changes.  There are no new asymmetries.  No new tremors.  Behavior/Demeanor: He continues to be irritable at times and becomes frustrated but he is participating more and making gains cognitively.  Speech: Vague slow and limited in his ability to participate.  Answers are consistent however.  Psychomotor:   Reportedly participating a bit better.  Still overall slow.  Mood: Currently with no obvious depression or anxiety.  No reports of any dov.  Affect: Baseline restricted.  Occasional frustration and irritability.  Thought Process/Associations: Not loose slow.  Not disorganized.  Still concrete.  No recent change.  Thought Content: No psychosis  Perception: No changes  Insight: Baseline impaired.  Judgment: Baseline impaired  Attention/Concentration: Needing structure and prompts.   [place].  Language: Baseline impaired  Fund of Knowledge:  .    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 They agree to call or return sooner with any questions or concerns.  Risks and benefits were discussed.  Continue with his individual therapist.     Continue with the support of the clinic, reassurance, and redirection. Staff monitoring and ongoing assessments per team plan. Current psychotropic medication appears to represent the minimum effective dosage and appears medically necessary. We will continue to monitor and reassess. This team will utilize appropriate emergency services if necessary. I will make myself available if concerns or problems arise.    Total time spent with the patient today was 28 minutes with greater than 50% of the time spent in counseling and care coordination. The patient agrees to call before then with any questions, concerns or problems. We will assess for the appropriateness of possible psychotropic medication trials/changes. The patient will seek out appropriate emergency services should that become necessary.    Video Visit Details    Type of service: Video Visit    Video Start Time: 10:20  AM    Video End Time: 10:42 AM    Total time for video call: 22 minutes    Originating Location: Patient's home    Distant Location:  [facility]    Mode of Communication: Video call via [employer]      Total time for interview with patient, family as well as chart review, coordination of care and documentation is 27 minutes.    Patient Instructions   It was nice speaking with you today for our office video visit. The following is a summary of our visit.    General Information:    If lab work was done today  as part of your evaluation you will generally be contacted via My Chart, mail, or phone with the results within 1-5 days. If there is an alarming result we will contact you by phone. Lab results come back at varying times, I generally wait until all labs are resulted before making comments on results. Please note labs are automatically released to My Chart once available.     If you need refills please contact your pharmacist. They will send a refill request to me to review. Please allow 3 business days for us to process all refill requests.     Please call or send a medical message through My Chart, with any questions or concerns.    If you need any paperwork completed please fax forms to [phone]. Please state if you would like a copy of the completed paperwork, mailed or faxed back to the patient and a fax number to fax the paperwork to. Please allow up to 10 business days for paperwork to be completed.    Robin Parker MD

## 2022-11-15 ENCOUNTER — VIRTUAL VISIT (OUTPATIENT)
Dept: NEUROLOGY | Facility: CLINIC | Age: 37
End: 2022-11-15
Payer: MEDICARE

## 2022-11-15 DIAGNOSIS — F06.30 MOOD DISORDER AS LATE EFFECT OF TRAUMATIC BRAIN INJURY (H): Primary | ICD-10-CM

## 2022-11-15 DIAGNOSIS — S06.9XAS MOOD DISORDER AS LATE EFFECT OF TRAUMATIC BRAIN INJURY (H): Primary | ICD-10-CM

## 2022-11-15 PROCEDURE — 99214 OFFICE O/P EST MOD 30 MIN: CPT | Mod: 95 | Performed by: PSYCHIATRY & NEUROLOGY

## 2022-11-15 NOTE — PATIENT INSTRUCTIONS
The patient is doing well and tolerating the current medications.  I will make no changes at this time.  The patient should return to this clinic in about 3 months for follow-up and the father agrees to call before then with any questions or concerns.

## 2022-11-15 NOTE — LETTER
11/15/2022         RE: Robin Blum  [id number] [facility] [id number]        Dear Colleague,    Thank you for referring your patient, Robin Blum, to the [facility]. Please see a copy of my visit note below.            Outpatient Follow up TBI Evaluation     Pertinent History: The patient was referred to this clinic for further assessment and treatment .The patient has a history of significant cognitive mood and behavioral difficulties related to a prior traumatic brain injury.  After my April 2016 visit he had been followed by the nurse practitioner but reestablish contact with my clinic in October 2018.     Patient last saw the nurse practitioner and June 2018.  The patient was a bit more sedated at that time following a recent change in his Seroquel to extended release due to some increased agitation.  At that visit low-dose daytime Seroquel was added.     Patient is followed by therapist Ivet Rizo and last saw the therapist September 2018.  He moved to a new group home in 2018 and seemed to like that.  He seemed to be doing fairly well at that time.     I saw the patient in October 2018.  The patient was doing relatively well at that time and I did not make any psychotropic medication changes.     I saw the patient in January 2019.  At that time he was communicating a bit more and using gestures.  He continued to be frustrated with occasional conflict with caregivers.  We did not make any medication changes at that time.     I saw the patient in August 2019.  I met with the patient's mother and father for 20 minutes prior to that appointment.  They had some concerns about the patient's presentation and his cares.  They felt he was spending too much time on the iPad.  We did address this with the group home staff.  We made no medication changes at that time.  The patient moved back home with his mom and dad on October 30.  That went quite well for a week or so  but then the patient began showing more behavioral issues.  Shortly before the November 2019 meeting I did increase the patient's PRN Seroquel in the context of been increased behavioral issues and refusals of multiple medications.     I saw the patient in November 2019.  The patient had been at his current group home for about and was having some behaviors possibly related to adjustment.  The PRN Seroquel had been somewhat helpful.  There was questions as to whether the patient could benefit from a behavioral analyst and they were going to pursue that as well.      I saw the patient in November 2019. He was living in his parents house at that time. He was doing fairly well but had occasional outbursts. Oftentimes these were directed towards his father in frustration. They were starting with a behavioral analyst to begin working on those issues.     I saw the patient in September 2020.  He was doing relatively well at that time and tolerating them change in group homes.  We continued his medication regime.     The patient in March 2020.  He was doing fairly well at that time and was attending speech therapy which was reportedly helpful.  We elected to continue with his current treatment plan at that time and did not make any changes.     Saw the patient in June 2021.  He continued to make progress in therapies.  He was becoming stronger and was dressing himself.  He still had periods where he was upset and refused cares.  He was working with the behavioral analyst and that seem to be helping.  We did not make any medication changes at that visit.     The patient was seen in October 2021.  I also interviewed his mother and father at that time.  The patient was isolating a bit but was getting back on a more regular schedule.  We had considered an increase in the Pristiq.  He was tolerating the medication.  We continue with the current treatment plan at that time and did not make any changes.     The patient was seen  in early February 2022.  He was doing well at that time and they were trying to set some limits on screen time which seemed to be associated with some behavioral outbursts.  He was sleeping well and medically stable.  We continued with the treatment plan at that time.     The patient was seen in May 2022.  He was doing relatively well at that time but continued with occasional irritability and probable underlying depression.  We talked about possibly increasing the Pristiq but the family wanted to continue with the current treatment plan and we did not make changes.    The patient was seen on August 18, 2022 when he was doing well at that time.  His father felt that his cognition was actually improving and the Pristiq was helpful.  We increased the Pristiq to 125 mg a day as there was still room for improvement but behaviorally things were better.  No other new medical issues or concerns.     HPI:  Patient presents today for the purposes of medication management.  The patient was unable to participate again much today but the father provided history and states he believes the patient is still making progress.  He is easier to redirect.  He is participating better.  He is attending OT and speech therapy and they say he is making gains.  He continues with occasional outbursts and can get frustrated but overall he seems more comfortable and calm.    Mood seems better.  No talk of suicide.  No hallucinations or delusions.  No dov.  The patient is sleeping well and eating well.  No change in cognition.  He is tolerating the medication with no new medical issues or concerns and no evidence of any difficulty with the medications.  He is seeing a behavioral analyst twice a month and the father believes that has been helpful and they are learning strategies on redirection and supporting the patient which seem to be going well.     We discussed some treatment options and have elected to continue with the current treatment  plan.     Current Medications: Please see chart. Medications personally reviewed.        Review of Systems  A comprehensive review of systems was negative except for what is noted above     Mental Status Exam  Alertness:  The patient has been more alert and participating better according to the family.  Appearance:  No change in appearance according to the family.  Is comfortable and calm.  No evidence of any shortness of breath or pain.  Behavior/Demeanor:  More easily redirected and participating better.  Speech:  Limited.  No change.  He did not participate much today but did want me to know that he went to the [employer] and loved that.  Psychomotor:  Better participation but still overall slowed.  W.  Mood: Currently with no obvious depression or anxiety.  No reports of any dov.  Mood might be slightly better.  Affect: Baseline restricted.  Occasional frustration and irritability.  This is unchanged and he is more easily redirectable when he gets upset.  Thought Process/Associations: Not loose slow.  Not disorganized.  Still concrete.  No recent change.  Thought Content: No psychosis  Perception: No changes  Insight: Baseline impaired.  Judgment: Baseline impaired  Attention/Concentration: Needing structure and prompts.  [place].  Language: Baseline impaired  Fund of Knowledge:  .    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        Other:   Patient will return to clinic in  3 months. They agree to call or return sooner with any questions or concerns.  Risks and benefits were discussed.  Continue with his behavioral analyst.     Continue with the support of the clinic, reassurance, and redirection. Staff monitoring and ongoing assessments per team plan. Current psychotropic medication appears to  represent the minimum effective dosage and appears medically necessary. We will continue to monitor and reassess. This team will utilize appropriate emergency services if necessary. I will make myself available if concerns or problems arise.     Total time spent with the patient today was 28 minutes with greater than 50% of the time spent in counseling and care coordination. The patient agrees to call before then with any questions, concerns or problems.     Robin Parker MD        Again, thank you for allowing me to participate in the care of your patient.        Sincerely,        Robin Parker MD

## 2022-11-15 NOTE — PROGRESS NOTES
Outpatient Follow up TBI Evaluation     Pertinent History: The patient was referred to this clinic for further assessment and treatment .The patient has a history of significant cognitive mood and behavioral difficulties related to a prior traumatic brain injury.  After my April 2016 visit he had been followed by the nurse practitioner but reestablish contact with my clinic in October 2018.     Patient last saw the nurse practitioner and June 2018.  The patient was a bit more sedated at that time following a recent change in his Seroquel to extended release due to some increased agitation.  At that visit low-dose daytime Seroquel was added.     Patient is followed by therapist Ivet Rizo and last saw the therapist September 2018.  He moved to a new group home in 2018 and seemed to like that.  He seemed to be doing fairly well at that time.     I saw the patient in October 2018.  The patient was doing relatively well at that time and I did not make any psychotropic medication changes.     I saw the patient in January 2019.  At that time he was communicating a bit more and using gestures.  He continued to be frustrated with occasional conflict with caregivers.  We did not make any medication changes at that time.     I saw the patient in August 2019.  I met with the patient's mother and father for 20 minutes prior to that appointment.  They had some concerns about the patient's presentation and his cares.  They felt he was spending too much time on the iPad.  We did address this with the group home staff.  We made no medication changes at that time.  The patient moved back home with his mom and dad on October 30.  That went quite well for a week or so but then the patient began showing more behavioral issues.  Shortly before the November 2019 meeting I did increase the patient's PRN Seroquel in the context of been increased behavioral issues and refusals of multiple medications.     I saw the patient in  November 2019.  The patient had been at his current group home for about and was having some behaviors possibly related to adjustment.  The PRN Seroquel had been somewhat helpful.  There was questions as to whether the patient could benefit from a behavioral analyst and they were going to pursue that as well.      I saw the patient in November 2019. He was living in his parents house at that time. He was doing fairly well but had occasional outbursts. Oftentimes these were directed towards his father in frustration. They were starting with a behavioral analyst to begin working on those issues.     I saw the patient in September 2020.  He was doing relatively well at that time and tolerating them change in group homes.  We continued his medication regime.     The patient in March 2020.  He was doing fairly well at that time and was attending speech therapy which was reportedly helpful.  We elected to continue with his current treatment plan at that time and did not make any changes.     Saw the patient in June 2021.  He continued to make progress in therapies.  He was becoming stronger and was dressing himself.  He still had periods where he was upset and refused cares.  He was working with the behavioral analyst and that seem to be helping.  We did not make any medication changes at that visit.     The patient was seen in October 2021.  I also interviewed his mother and father at that time.  The patient was isolating a bit but was getting back on a more regular schedule.  We had considered an increase in the Pristiq.  He was tolerating the medication.  We continue with the current treatment plan at that time and did not make any changes.     The patient was seen in early February 2022.  He was doing well at that time and they were trying to set some limits on screen time which seemed to be associated with some behavioral outbursts.  He was sleeping well and medically stable.  We continued with the treatment plan at  that time.     The patient was seen in May 2022.  He was doing relatively well at that time but continued with occasional irritability and probable underlying depression.  We talked about possibly increasing the Pristiq but the family wanted to continue with the current treatment plan and we did not make changes.    The patient was seen on August 18, 2022 when he was doing well at that time.  His father felt that his cognition was actually improving and the Pristiq was helpful.  We increased the Pristiq to 125 mg a day as there was still room for improvement but behaviorally things were better.  No other new medical issues or concerns.     HPI:  Patient presents today for the purposes of medication management.  The patient was unable to participate again much today but the father provided history and states he believes the patient is still making progress.  He is easier to redirect.  He is participating better.  He is attending OT and speech therapy and they say he is making gains.  He continues with occasional outbursts and can get frustrated but overall he seems more comfortable and calm.    Mood seems better.  No talk of suicide.  No hallucinations or delusions.  No dov.  The patient is sleeping well and eating well.  No change in cognition.  He is tolerating the medication with no new medical issues or concerns and no evidence of any difficulty with the medications.  He is seeing a behavioral analyst twice a month and the father believes that has been helpful and they are learning strategies on redirection and supporting the patient which seem to be going well.     We discussed some treatment options and have elected to continue with the current treatment plan.     Current Medications: Please see chart. Medications personally reviewed.        Review of Systems  A comprehensive review of systems was negative except for what is noted above     Mental Status Exam  Alertness:  The patient has been more alert and  participating better according to the family.  Appearance:  No change in appearance according to the family.  Is comfortable and calm.  No evidence of any shortness of breath or pain.  Behavior/Demeanor:  More easily redirected and participating better.  Speech:  Limited.  No change.  He did not participate much today but did want me to know that he went to the [employer] and loved that.  Psychomotor:  Better participation but still overall slowed.  W.  Mood: Currently with no obvious depression or anxiety.  No reports of any dov.  Mood might be slightly better.  Affect: Baseline restricted.  Occasional frustration and irritability.  This is unchanged and he is more easily redirectable when he gets upset.  Thought Process/Associations: Not loose slow.  Not disorganized.  Still concrete.  No recent change.  Thought Content: No psychosis  Perception: No changes  Insight: Baseline impaired.  Judgment: Baseline impaired  Attention/Concentration: Needing structure and prompts.  [place].  Language: Baseline impaired  Fund of Knowledge:  .    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        Other:   Patient will return to clinic in  3 months. They agree to call or return sooner with any questions or concerns.  Risks and benefits were discussed.  Continue with his behavioral analyst.     Continue with the support of the clinic, reassurance, and redirection. Staff monitoring and ongoing assessments per team plan. Current psychotropic medication appears to represent the minimum effective dosage and appears medically necessary. We will continue to monitor and reassess. This team will utilize appropriate emergency services if necessary. I will make myself available if concerns or problems arise.     Total time spent  with the patient today was 28 minutes with greater than 50% of the time spent in counseling and care coordination. The patient agrees to call before then with any questions, concerns or problems.     Robin Parker MD

## 2022-11-17 ENCOUNTER — TELEPHONE (OUTPATIENT)
Dept: NEUROLOGY | Facility: CLINIC | Age: 37
End: 2022-11-17

## 2022-11-17 NOTE — TELEPHONE ENCOUNTER
did not leave VM to schedule. The voicemail was for kong Kelly. Called number on file but different name

## 2022-11-29 DIAGNOSIS — F06.30 MOOD DISORDER AS LATE EFFECT OF TRAUMATIC BRAIN INJURY (H): ICD-10-CM

## 2022-11-29 DIAGNOSIS — S06.9XAS MOOD DISORDER AS LATE EFFECT OF TRAUMATIC BRAIN INJURY (H): ICD-10-CM

## 2022-11-30 ENCOUNTER — TELEPHONE (OUTPATIENT)
Dept: NEUROLOGY | Facility: CLINIC | Age: 37
End: 2022-11-30

## 2022-11-30 RX ORDER — DESVENLAFAXINE 25 MG/1
TABLET, EXTENDED RELEASE ORAL
Qty: 30 TABLET | Refills: 0 | Status: SHIPPED | OUTPATIENT
Start: 2022-11-30 | End: 2023-01-04

## 2022-11-30 NOTE — TELEPHONE ENCOUNTER
Refill request for pristiq 25 mg  Last follow-up 11/15/22; next follow-up unscheduled.  Pt due for follow-up in February. Will have scheduling reach out  Medication T'd for review and signature  Manjinder Irwin, PRITESH ATC on 11/30/2022 at 7:59 AM    desvenlafaxine (PRISTIQ) 25 MG 24 hr tablet 30 tablet 3 8/18/2022  --   Sig - Route: Take 1 tablet (25 mg) by mouth daily Take along with a 100 mg pill for a total daily dose of 125 mg a day. - Oral

## 2022-11-30 NOTE — TELEPHONE ENCOUNTER
----- Message from Manjinder Irwin ATC sent at 11/30/2022  7:59 AM CST -----  Please schedule patient for follow-up visit with Dr. Parker in February

## 2023-01-02 DIAGNOSIS — S06.9XAS MOOD DISORDER AS LATE EFFECT OF TRAUMATIC BRAIN INJURY (H): ICD-10-CM

## 2023-01-02 DIAGNOSIS — F06.30 MOOD DISORDER AS LATE EFFECT OF TRAUMATIC BRAIN INJURY (H): ICD-10-CM

## 2023-01-03 NOTE — TELEPHONE ENCOUNTER
Refill request for pristiq 25 mg  Last follow-up 11/15/22; next follow-up 2/16/23  Medication T'd for review and signature  Manjinder Irwin, PRITESH ATC on 1/3/2023 at 9:14 AM    desvenlafaxine succinate (PRISTIQ) 25 MG 24 hr tablet 30 tablet 0 11/30/2022  No   Sig: Take 1 tablet (25 mg) by mouth daily along with a 100 mg tablet for a total daily dose of 125 mg a day.

## 2023-01-04 RX ORDER — DESVENLAFAXINE 25 MG/1
TABLET, EXTENDED RELEASE ORAL
Qty: 30 TABLET | Refills: 0 | Status: SHIPPED | OUTPATIENT
Start: 2023-01-04 | End: 2023-02-10

## 2023-02-09 DIAGNOSIS — F06.30 MOOD DISORDER AS LATE EFFECT OF TRAUMATIC BRAIN INJURY (H): ICD-10-CM

## 2023-02-09 DIAGNOSIS — S06.9XAS MOOD DISORDER AS LATE EFFECT OF TRAUMATIC BRAIN INJURY (H): ICD-10-CM

## 2023-02-09 NOTE — TELEPHONE ENCOUNTER
Medication refill request for Desvenlafaxine Succinate ER Oral Tablet Extended Release 24 Hour 25 MG.     Last Written Prescription Date:  1/4/2023  Last Fill Quantity: 30,  # refills: 0  Last office visit provider:  11/15/2022  Next appointment scheduled: 2/16/2023    Medication T'd for review and signature    NARAYAN Meyer on 2/9/2023 at 2:56 PM

## 2023-02-10 RX ORDER — DESVENLAFAXINE 25 MG/1
TABLET, EXTENDED RELEASE ORAL
Qty: 30 TABLET | Refills: 0 | Status: SHIPPED | OUTPATIENT
Start: 2023-02-10 | End: 2023-02-16

## 2023-02-16 ENCOUNTER — VIRTUAL VISIT (OUTPATIENT)
Dept: NEUROLOGY | Facility: CLINIC | Age: 38
End: 2023-02-16
Payer: MEDICARE

## 2023-02-16 DIAGNOSIS — F06.30 MOOD DISORDER AS LATE EFFECT OF TRAUMATIC BRAIN INJURY (H): ICD-10-CM

## 2023-02-16 DIAGNOSIS — S06.9XAS MOOD DISORDER AS LATE EFFECT OF TRAUMATIC BRAIN INJURY (H): ICD-10-CM

## 2023-02-16 PROCEDURE — 99214 OFFICE O/P EST MOD 30 MIN: CPT | Mod: VID | Performed by: PSYCHIATRY & NEUROLOGY

## 2023-02-16 RX ORDER — DESVENLAFAXINE 100 MG/1
100 TABLET, EXTENDED RELEASE ORAL EVERY MORNING
Qty: 30 TABLET | Refills: 3 | Status: SHIPPED | OUTPATIENT
Start: 2023-02-16 | End: 2023-05-25

## 2023-02-16 RX ORDER — CLONAZEPAM 1 MG/1
1 TABLET ORAL 4 TIMES DAILY
Qty: 120 TABLET | Refills: 3 | Status: SHIPPED | OUTPATIENT
Start: 2023-02-16 | End: 2023-05-25

## 2023-02-16 RX ORDER — QUETIAPINE FUMARATE 25 MG/1
25 TABLET, FILM COATED ORAL 3 TIMES DAILY PRN
Qty: 90 TABLET | Refills: 3 | Status: SHIPPED | OUTPATIENT
Start: 2023-02-16 | End: 2023-05-25

## 2023-02-16 RX ORDER — DESVENLAFAXINE 25 MG/1
TABLET, EXTENDED RELEASE ORAL
Qty: 30 TABLET | Refills: 0 | Status: SHIPPED | OUTPATIENT
Start: 2023-02-16 | End: 2023-04-06

## 2023-02-16 NOTE — PATIENT INSTRUCTIONS
The patient is doing relatively well and tolerating the medication.  I will make no medication changes.  The patient should return to this clinic in 3 or 4 months for follow-up.  They agree to contact us before then with any questions or concerns.

## 2023-02-16 NOTE — PROGRESS NOTES
Robin Blum  is being evaluated via a billable video visit.      How would you like to obtain your AVS? [employer]  For the video visit, send the invitation by: Text to cell phone: [phone]  Will anyone else be joining your video visit? No              Outpatient Follow up TBI Evaluation     Pertinent History: The patient was referred to this clinic for further assessment and treatment .The patient has a history of significant cognitive mood and behavioral difficulties related to a prior traumatic brain injury.  After my April 2016 visit he had been followed by the nurse practitioner but reestablish contact with my clinic in October 2018.     Patient last saw the nurse practitioner and June 2018.  The patient was a bit more sedated at that time following a recent change in his Seroquel to extended release due to some increased agitation.  At that visit low-dose daytime Seroquel was added.     Patient is followed by therapist Ivet Rizo and last saw the therapist September 2018.  He moved to a new group home in 2018 and seemed to like that.  He seemed to be doing fairly well at that time.     I saw the patient in October 2018.  The patient was doing relatively well at that time and I did not make any psychotropic medication changes.     I saw the patient in January 2019.  At that time he was communicating a bit more and using gestures.  He continued to be frustrated with occasional conflict with caregivers.  We did not make any medication changes at that time.     I saw the patient in August 2019.  I met with the patient's mother and father for 20 minutes prior to that appointment.  They had some concerns about the patient's presentation and his cares.  They felt he was spending too much time on the iPad.  We did address this with the group home staff.  We made no medication changes at that time.  The patient moved back home with his mom and dad on October 30.  That went quite well for a week or so but then  the patient began showing more behavioral issues.  Shortly before the November 2019 meeting I did increase the patient's PRN Seroquel in the context of been increased behavioral issues and refusals of multiple medications.     I saw the patient in November 2019.  The patient had been at his current group home for about and was having some behaviors possibly related to adjustment.  The PRN Seroquel had been somewhat helpful.  There was questions as to whether the patient could benefit from a behavioral analyst and they were going to pursue that as well.      I saw the patient in November 2019. He was living in his parents house at that time. He was doing fairly well but had occasional outbursts. Oftentimes these were directed towards his father in frustration. They were starting with a behavioral analyst to begin working on those issues.     I saw the patient in September 2020.  He was doing relatively well at that time and tolerating them change in group homes.  We continued his medication regime.     The patient in March 2020.  He was doing fairly well at that time and was attending speech therapy which was reportedly helpful.  We elected to continue with his current treatment plan at that time and did not make any changes.     Saw the patient in June 2021.  He continued to make progress in therapies.  He was becoming stronger and was dressing himself.  He still had periods where he was upset and refused cares.  He was working with the behavioral analyst and that seem to be helping.  We did not make any medication changes at that visit.     The patient was seen in October 2021.  I also interviewed his mother and father at that time.  The patient was isolating a bit but was getting back on a more regular schedule.  We had considered an increase in the Pristiq.  He was tolerating the medication.  We continue with the current treatment plan at that time and did not make any changes.     The patient was seen in early  February 2022.  He was doing well at that time and they were trying to set some limits on screen time which seemed to be associated with some behavioral outbursts.  He was sleeping well and medically stable.  We continued with the treatment plan at that time.     The patient was seen in May 2022.  He was doing relatively well at that time but continued with occasional irritability and probable underlying depression.  We talked about possibly increasing the Pristiq but the family wanted to continue with the current treatment plan and we did not make changes.    The patient was seen on August 18, 2022 when he was doing well at that time.  His father felt that his cognition was actually improving and the Pristiq was helpful.  We increased the Pristiq to 125 mg a day as there was still room for improvement but behaviorally things were better.  No other new medical issues or concerns.       The patient was seen for follow-up in November 2022.  He was doing relatively well at that time and was tolerating the medication.  We did not make any medication changes.    HPI:  Patient presents today for the purposes of medication management.   The patient presented today with his father as well as a caregiver who all reported the patient was doing quite well.  The patient continues to have periods of frustration or difficulty with communication but there is been no significantly aggressive or self-injurious behaviors.  No hallucinations or delusions.  He is sleeping well and eating well.  No change in cognition.  The patient however is making progress in therapies he is participating more.  He is now starting to walk again with assistance and that is going extremely well and the family was pleased by that.  There has been no other new medical issues or concerns but the patient did have a sinus infection a few weeks ago.  He is tolerating the medication.  They like to continue with the current treatment plan.     We discussed some  treatment options and have elected to continue with the current treatment plan.     Current Medications: Please see chart. Medications personally reviewed.        Review of Systems  A comprehensive review of systems was negative except for what is noted above     Mental Status Exam  Alertness:  Bright today.  Alert.  Appearance:  No pain or discomfort.  No change in his presentation.  Behavior/Demeanor:  More easily redirected and participating better.  He is participating more in therapy.  He was calm and comfortable with me but has periodic verbal outbursts according to the family.  Speech:  Limited.  He was mostly mute with me but did do some sign language.  Psychomotor:  Better participation but still overall slowed.   Mood:  Fairly bright today.  No evidence of any dov.  No significant depression.  Affect:  More animated today.  He has periods of irritability but was calm with me.  Thought Process/Associations: Not loose slow.  Not disorganized.  Still concrete.  No recent change.  He does participate a bit more today.  Thought Content: No psychosis  Perception: No changes  Insight: Baseline impaired.  Judgment: Baseline impaired  Attention/Concentration: Needing structure and prompts.  [place].  Language: Baseline impaired  Fund of Knowledge:  .    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        Other:   Patient will return to clinic in  3 months. They agree to call or return sooner with any questions or concerns.  Risks and benefits were discussed.  Continue with his behavioral analyst.     Continue with the support of the clinic, reassurance, and redirection. Staff monitoring and ongoing assessments per team plan. Current psychotropic medication appears to represent the minimum effective dosage and appears  medically necessary. We will continue to monitor and reassess. This team will utilize appropriate emergency services if necessary. I will make myself available if concerns or problems arise.     Total time spent with the patient today was 26 minutes with greater than 50% of the time spent in counseling and care coordination. The patient agrees to call before then with any questions, concerns or problems.     Robin Parker MD

## 2023-02-16 NOTE — LETTER
2/16/2023         RE: Robin Blum  [id number] [address] [id number]        Dear Colleague,    Thank you for referring your patient, Robin Blum, to the [facility]. Please see a copy of my visit note below.    Robin Blum  is being evaluated via a billable video visit.      How would you like to obtain your AVS? [employer]  For the video visit, send the invitation by: Text to cell phone: [phone]  Will anyone else be joining your video visit? No              Outpatient Follow up TBI Evaluation     Pertinent History: The patient was referred to this clinic for further assessment and treatment .The patient has a history of significant cognitive mood and behavioral difficulties related to a prior traumatic brain injury.  After my April 2016 visit he had been followed by the nurse practitioner but reestablish contact with my clinic in October 2018.     Patient last saw the nurse practitioner and June 2018.  The patient was a bit more sedated at that time following a recent change in his Seroquel to extended release due to some increased agitation.  At that visit low-dose daytime Seroquel was added.     Patient is followed by therapist Ivet Rizo and last saw the therapist September 2018.  He moved to a new group home in 2018 and seemed to like that.  He seemed to be doing fairly well at that time.     I saw the patient in October 2018.  The patient was doing relatively well at that time and I did not make any psychotropic medication changes.     I saw the patient in January 2019.  At that time he was communicating a bit more and using gestures.  He continued to be frustrated with occasional conflict with caregivers.  We did not make any medication changes at that time.     I saw the patient in August 2019.  I met with the patient's mother and father for 20 minutes prior to that appointment.  They had some concerns about the patient's presentation and his  cares.  They felt he was spending too much time on the iPad.  We did address this with the group home staff.  We made no medication changes at that time.  The patient moved back home with his mom and dad on October 30.  That went quite well for a week or so but then the patient began showing more behavioral issues.  Shortly before the November 2019 meeting I did increase the patient's PRN Seroquel in the context of been increased behavioral issues and refusals of multiple medications.     I saw the patient in November 2019.  The patient had been at his current group home for about and was having some behaviors possibly related to adjustment.  The PRN Seroquel had been somewhat helpful.  There was questions as to whether the patient could benefit from a behavioral analyst and they were going to pursue that as well.      I saw the patient in November 2019. He was living in his parents house at that time. He was doing fairly well but had occasional outbursts. Oftentimes these were directed towards his father in frustration. They were starting with a behavioral analyst to begin working on those issues.     I saw the patient in September 2020.  He was doing relatively well at that time and tolerating them change in group homes.  We continued his medication regime.     The patient in March 2020.  He was doing fairly well at that time and was attending speech therapy which was reportedly helpful.  We elected to continue with his current treatment plan at that time and did not make any changes.     Saw the patient in June 2021.  He continued to make progress in therapies.  He was becoming stronger and was dressing himself.  He still had periods where he was upset and refused cares.  He was working with the behavioral analyst and that seem to be helping.  We did not make any medication changes at that visit.     The patient was seen in October 2021.  I also interviewed his mother and father at that time.  The patient was  isolating a bit but was getting back on a more regular schedule.  We had considered an increase in the Pristiq.  He was tolerating the medication.  We continue with the current treatment plan at that time and did not make any changes.     The patient was seen in early February 2022.  He was doing well at that time and they were trying to set some limits on screen time which seemed to be associated with some behavioral outbursts.  He was sleeping well and medically stable.  We continued with the treatment plan at that time.     The patient was seen in May 2022.  He was doing relatively well at that time but continued with occasional irritability and probable underlying depression.  We talked about possibly increasing the Pristiq but the family wanted to continue with the current treatment plan and we did not make changes.    The patient was seen on August 18, 2022 when he was doing well at that time.  His father felt that his cognition was actually improving and the Pristiq was helpful.  We increased the Pristiq to 125 mg a day as there was still room for improvement but behaviorally things were better.  No other new medical issues or concerns.       The patient was seen for follow-up in November 2022.  He was doing relatively well at that time and was tolerating the medication.  We did not make any medication changes.    HPI:  Patient presents today for the purposes of medication management.   The patient presented today with his father as well as a caregiver who all reported the patient was doing quite well.  The patient continues to have periods of frustration or difficulty with communication but there is been no significantly aggressive or self-injurious behaviors.  No hallucinations or delusions.  He is sleeping well and eating well.  No change in cognition.  The patient however is making progress in therapies he is participating more.  He is now starting to walk again with assistance and that is going extremely  well and the family was pleased by that.  There has been no other new medical issues or concerns but the patient did have a sinus infection a few weeks ago.  He is tolerating the medication.  They like to continue with the current treatment plan.     We discussed some treatment options and have elected to continue with the current treatment plan.     Current Medications: Please see chart. Medications personally reviewed.        Review of Systems  A comprehensive review of systems was negative except for what is noted above     Mental Status Exam  Alertness:  Bright today.  Alert.  Appearance:  No pain or discomfort.  No change in his presentation.  Behavior/Demeanor:  More easily redirected and participating better.  He is participating more in therapy.  He was calm and comfortable with me but has periodic verbal outbursts according to the family.  Speech:  Limited.  He was mostly mute with me but did do some sign language.  Psychomotor:  Better participation but still overall slowed.   Mood:  Fairly bright today.  No evidence of any dov.  No significant depression.  Affect:  More animated today.  He has periods of irritability but was calm with me.  Thought Process/Associations: Not loose slow.  Not disorganized.  Still concrete.  No recent change.  He does participate a bit more today.  Thought Content: No psychosis  Perception: No changes  Insight: Baseline impaired.  Judgment: Baseline impaired  Attention/Concentration: Needing structure and prompts.  [place].  Language: Baseline impaired  Fund of Knowledge:  .    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        Other:   Patient will return to clinic in  3 months. They agree to call or return sooner with any questions or concerns.  Risks and benefits were  discussed.  Continue with his behavioral analyst.     Continue with the support of the clinic, reassurance, and redirection. Staff monitoring and ongoing assessments per team plan. Current psychotropic medication appears to represent the minimum effective dosage and appears medically necessary. We will continue to monitor and reassess. This team will utilize appropriate emergency services if necessary. I will make myself available if concerns or problems arise.     Total time spent with the patient today was 26 minutes with greater than 50% of the time spent in counseling and care coordination. The patient agrees to call before then with any questions, concerns or problems.     Robin Parker MD        Again, thank you for allowing me to participate in the care of your patient.        Sincerely,        Robin Parker MD

## 2023-02-23 ENCOUNTER — TELEPHONE (OUTPATIENT)
Dept: NEUROLOGY | Facility: CLINIC | Age: 38
End: 2023-02-23
Payer: MEDICARE

## 2023-04-05 DIAGNOSIS — F06.30 MOOD DISORDER AS LATE EFFECT OF TRAUMATIC BRAIN INJURY (H): ICD-10-CM

## 2023-04-05 DIAGNOSIS — S06.9XAS MOOD DISORDER AS LATE EFFECT OF TRAUMATIC BRAIN INJURY (H): ICD-10-CM

## 2023-04-06 RX ORDER — DESVENLAFAXINE 25 MG/1
TABLET, EXTENDED RELEASE ORAL
Qty: 30 TABLET | Refills: 3 | Status: SHIPPED | OUTPATIENT
Start: 2023-04-06 | End: 2023-05-25

## 2023-04-06 NOTE — TELEPHONE ENCOUNTER
Medication refill request for desvenlafaxine succinate (PRISTIQ) 25 MG 24 hr tablet.     Last Written Prescription Date:  2/16/2023  Last Fill Quantity: 30,  # refills: 0  Last office visit provider:  2/16/2023  Next appointment scheduled: 5/25/2023    Medication T'd for review and signature      NARAYAN Meyer on 4/6/2023 at 8:38 AM

## 2023-04-23 ENCOUNTER — HEALTH MAINTENANCE LETTER (OUTPATIENT)
Age: 38
End: 2023-04-23

## 2023-05-25 ENCOUNTER — VIRTUAL VISIT (OUTPATIENT)
Dept: NEUROLOGY | Facility: CLINIC | Age: 38
End: 2023-05-25
Payer: MEDICARE

## 2023-05-25 DIAGNOSIS — F06.30 MOOD DISORDER AS LATE EFFECT OF TRAUMATIC BRAIN INJURY (H): ICD-10-CM

## 2023-05-25 DIAGNOSIS — S06.9XAS MOOD DISORDER AS LATE EFFECT OF TRAUMATIC BRAIN INJURY (H): ICD-10-CM

## 2023-05-25 PROCEDURE — 99443 PR PHYSICIAN TELEPHONE EVALUATION 21-30 MIN: CPT | Mod: 95 | Performed by: PSYCHIATRY & NEUROLOGY

## 2023-05-25 RX ORDER — QUETIAPINE FUMARATE 50 MG/1
50 TABLET, FILM COATED ORAL AT BEDTIME
Qty: 30 TABLET | Refills: 4 | Status: SHIPPED | OUTPATIENT
Start: 2023-05-25 | End: 2023-05-31 | Stop reason: DRUGHIGH

## 2023-05-25 RX ORDER — DESVENLAFAXINE 25 MG/1
TABLET, EXTENDED RELEASE ORAL
Qty: 30 TABLET | Refills: 3 | Status: SHIPPED | OUTPATIENT
Start: 2023-05-25 | End: 2023-11-02

## 2023-05-25 RX ORDER — CLONAZEPAM 1 MG/1
1 TABLET ORAL 4 TIMES DAILY
Qty: 120 TABLET | Refills: 3 | Status: SHIPPED | OUTPATIENT
Start: 2023-05-25 | End: 2023-11-02

## 2023-05-25 RX ORDER — QUETIAPINE FUMARATE 25 MG/1
25 TABLET, FILM COATED ORAL 3 TIMES DAILY PRN
Qty: 90 TABLET | Refills: 3 | Status: SHIPPED | OUTPATIENT
Start: 2023-05-25 | End: 2023-11-02

## 2023-05-25 RX ORDER — DESVENLAFAXINE 100 MG/1
100 TABLET, EXTENDED RELEASE ORAL EVERY MORNING
Qty: 30 TABLET | Refills: 3 | Status: SHIPPED | OUTPATIENT
Start: 2023-05-25 | End: 2023-11-02

## 2023-05-25 NOTE — PATIENT INSTRUCTIONS
The patient is doing relatively well and tolerating the medication.  I will make no changes at this time.  He should return to this clinic in 3 to 4 months for medication check.

## 2023-05-25 NOTE — PROGRESS NOTES
Phone Visit 22 Minutes        Outpatient Follow up TBI Evaluation     Pertinent History: The patient was referred to this clinic for further assessment and treatment .The patient has a history of significant cognitive mood and behavioral difficulties related to a prior traumatic brain injury.  After my April 2016 visit he had been followed by the nurse practitioner but reestablish contact with my clinic in October 2018.     Patient last saw the nurse practitioner and June 2018.  The patient was a bit more sedated at that time following a recent change in his Seroquel to extended release due to some increased agitation.  At that visit low-dose daytime Seroquel was added.     Patient is followed by therapist Ivet Rizo and last saw the therapist September 2018.  He moved to a new group home in 2018 and seemed to like that.  He seemed to be doing fairly well at that time.     I saw the patient in October 2018.  The patient was doing relatively well at that time and I did not make any psychotropic medication changes.     I saw the patient in January 2019.  At that time he was communicating a bit more and using gestures.  He continued to be frustrated with occasional conflict with caregivers.  We did not make any medication changes at that time.     I saw the patient in August 2019.  I met with the patient's mother and father for 20 minutes prior to that appointment.  They had some concerns about the patient's presentation and his cares.  They felt he was spending too much time on the iPad.  We did address this with the group home staff.  We made no medication changes at that time.  The patient moved back home with his mom and dad on October 30.  That went quite well for a week or so but then the patient began showing more behavioral issues.  Shortly before the November 2019 meeting I did increase the patient's PRN Seroquel in the context of been increased behavioral issues and refusals of multiple medications.     I  saw the patient in November 2019.  The patient had been at his current group home for about and was having some behaviors possibly related to adjustment.  The PRN Seroquel had been somewhat helpful.  There was questions as to whether the patient could benefit from a behavioral analyst and they were going to pursue that as well.      I saw the patient in November 2019. He was living in his parents house at that time. He was doing fairly well but had occasional outbursts. Oftentimes these were directed towards his father in frustration. They were starting with a behavioral analyst to begin working on those issues.     I saw the patient in September 2020.  He was doing relatively well at that time and tolerating them change in group homes.  We continued his medication regime.     The patient in March 2020.  He was doing fairly well at that time and was attending speech therapy which was reportedly helpful.  We elected to continue with his current treatment plan at that time and did not make any changes.     Saw the patient in June 2021.  He continued to make progress in therapies.  He was becoming stronger and was dressing himself.  He still had periods where he was upset and refused cares.  He was working with the behavioral analyst and that seem to be helping.  We did not make any medication changes at that visit.     The patient was seen in October 2021.  I also interviewed his mother and father at that time.  The patient was isolating a bit but was getting back on a more regular schedule.  We had considered an increase in the Pristiq.  He was tolerating the medication.  We continue with the current treatment plan at that time and did not make any changes.     The patient was seen in early February 2022.  He was doing well at that time and they were trying to set some limits on screen time which seemed to be associated with some behavioral outbursts.  He was sleeping well and medically stable.  We continued with the  treatment plan at that time.     The patient was seen in May 2022.  He was doing relatively well at that time but continued with occasional irritability and probable underlying depression.  We talked about possibly increasing the Pristiq but the family wanted to continue with the current treatment plan and we did not make changes.    The patient was seen on August 18, 2022 when he was doing well at that time.  His father felt that his cognition was actually improving and the Pristiq was helpful.  We increased the Pristiq to 125 mg a day as there was still room for improvement but behaviorally things were better.  No other new medical issues or concerns.       The patient was seen for follow-up in November 2022.  He was doing relatively well at that time and was tolerating the medication.  We did not make any medication changes.      HPI:  Patient presents today for the purposes of medication management.  The patient was again unable to participate but his father and mother participated.  They reported no significant change and the patient is doing relatively well.  The winter was long and difficult due to the limited activity when he is now participating in therapies and exercise groups and that is going well.  No change in his mood which is relatively good and he is usually happy.  No hallucinations or delusions.  No side effects to the medication.  No significant change in his presentation.  They are comfortable with current treatment plan and are requesting no changes at this time.  No new tremors.  No new concerns.     We discussed some treatment options and have elected to continue with the current treatment plan.     Current Medications: Please see chart. Medications personally reviewed.        Review of Systems  A comprehensive review of systems was negative except for what is noted above     Mental Status Exam  Alertness:   Alert.  Appearance:  Phone interview  Behavior/Demeanor:  Periodic verbal outbursts  according to the family.Typical.  Speech:  Limited. No Change  Psychomotor:  Better participation but still overall slowed.   Mood:  Fairly bright today.  No evidence of any dov.  No significant depression.  Affect: He has periods of irritability, no change  Thought Process/Associations: Baseline impaired  Thought Content: No psychosis  Perception: No changes  Insight: Baseline impaired.  Judgment: Baseline impaired  Attention/Concentration: Needing structure and prompts.  [place].  Language: Baseline impaired  Fund of Knowledge:  .    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        Other:   Patient will return to clinic in  3-4 months. They agree to call or return sooner with any questions or concerns.  Risks and benefits were discussed.  Continue with his behavioral analyst.     Continue with the support of the clinic, reassurance, and redirection. Staff monitoring and ongoing assessments per team plan. Current psychotropic medication appears to represent the minimum effective dosage and appears medically necessary. We will continue to monitor and reassess. This team will utilize appropriate emergency services if necessary. I will make myself available if concerns or problems arise.      Robin Parker MD

## 2023-05-25 NOTE — NURSING NOTE
Is the patient currently in the state of [place]? YES    Visit mode:VIDEO    If the visit is dropped, the patient can be reconnected by: TELEPHONE VISIT: Phone number: [phone]    Will anyone else be joining the visit? NO      How would you like to obtain your AVS? MyChart    Are changes needed to the allergy or medication list? NO    Reason for visit: Video Visit (3 month follow-up )

## 2023-05-25 NOTE — LETTER
5/25/2023         RE: Robin Blum  [address] [facility] [address]        Dear Colleague,    Thank you for referring your patient, Robin Blum, to the [facility]. Please see a copy of my visit note below.    Phone Visit 22 Minutes        Outpatient Follow up TBI Evaluation     Pertinent History: The patient was referred to this clinic for further assessment and treatment .The patient has a history of significant cognitive mood and behavioral difficulties related to a prior traumatic brain injury.  After my April 2016 visit he had been followed by the nurse practitioner but reestablish contact with my clinic in October 2018.     Patient last saw the nurse practitioner and June 2018.  The patient was a bit more sedated at that time following a recent change in his Seroquel to extended release due to some increased agitation.  At that visit low-dose daytime Seroquel was added.     Patient is followed by therapist Ivet Rizo and last saw the therapist September 2018.  He moved to a new group home in 2018 and seemed to like that.  He seemed to be doing fairly well at that time.     I saw the patient in October 2018.  The patient was doing relatively well at that time and I did not make any psychotropic medication changes.     I saw the patient in January 2019.  At that time he was communicating a bit more and using gestures.  He continued to be frustrated with occasional conflict with caregivers.  We did not make any medication changes at that time.     I saw the patient in August 2019.  I met with the patient's mother and father for 20 minutes prior to that appointment.  They had some concerns about the patient's presentation and his cares.  They felt he was spending too much time on the iPad.  We did address this with the group home staff.  We made no medication changes at that time.  The patient moved back home with his mom and dad on October 30.  That  went quite well for a week or so but then the patient began showing more behavioral issues.  Shortly before the November 2019 meeting I did increase the patient's PRN Seroquel in the context of been increased behavioral issues and refusals of multiple medications.     I saw the patient in November 2019.  The patient had been at his current group home for about and was having some behaviors possibly related to adjustment.  The PRN Seroquel had been somewhat helpful.  There was questions as to whether the patient could benefit from a behavioral analyst and they were going to pursue that as well.      I saw the patient in November 2019. He was living in his parents house at that time. He was doing fairly well but had occasional outbursts. Oftentimes these were directed towards his father in frustration. They were starting with a behavioral analyst to begin working on those issues.     I saw the patient in September 2020.  He was doing relatively well at that time and tolerating them change in group homes.  We continued his medication regime.     The patient in March 2020.  He was doing fairly well at that time and was attending speech therapy which was reportedly helpful.  We elected to continue with his current treatment plan at that time and did not make any changes.     Saw the patient in June 2021.  He continued to make progress in therapies.  He was becoming stronger and was dressing himself.  He still had periods where he was upset and refused cares.  He was working with the behavioral analyst and that seem to be helping.  We did not make any medication changes at that visit.     The patient was seen in October 2021.  I also interviewed his mother and father at that time.  The patient was isolating a bit but was getting back on a more regular schedule.  We had considered an increase in the Pristiq.  He was tolerating the medication.  We continue with the current treatment plan at that time and did not make any  changes.     The patient was seen in early February 2022.  He was doing well at that time and they were trying to set some limits on screen time which seemed to be associated with some behavioral outbursts.  He was sleeping well and medically stable.  We continued with the treatment plan at that time.     The patient was seen in May 2022.  He was doing relatively well at that time but continued with occasional irritability and probable underlying depression.  We talked about possibly increasing the Pristiq but the family wanted to continue with the current treatment plan and we did not make changes.    The patient was seen on August 18, 2022 when he was doing well at that time.  His father felt that his cognition was actually improving and the Pristiq was helpful.  We increased the Pristiq to 125 mg a day as there was still room for improvement but behaviorally things were better.  No other new medical issues or concerns.       The patient was seen for follow-up in November 2022.  He was doing relatively well at that time and was tolerating the medication.  We did not make any medication changes.      HPI:  Patient presents today for the purposes of medication management.  The patient was again unable to participate but his father and mother participated.  They reported no significant change and the patient is doing relatively well.  The winter was long and difficult due to the limited activity when he is now participating in therapies and exercise groups and that is going well.  No change in his mood which is relatively good and he is usually happy.  No hallucinations or delusions.  No side effects to the medication.  No significant change in his presentation.  They are comfortable with current treatment plan and are requesting no changes at this time.  No new tremors.  No new concerns.     We discussed some treatment options and have elected to continue with the current treatment plan.     Current Medications:  Please see chart. Medications personally reviewed.        Review of Systems  A comprehensive review of systems was negative except for what is noted above     Mental Status Exam  Alertness:   Alert.  Appearance:  Phone interview  Behavior/Demeanor:  Periodic verbal outbursts according to the family.Typical.  Speech:  Limited. No Change  Psychomotor:  Better participation but still overall slowed.   Mood:  Fairly bright today.  No evidence of any dov.  No significant depression.  Affect: He has periods of irritability, no change  Thought Process/Associations: Baseline impaired  Thought Content: No psychosis  Perception: No changes  Insight: Baseline impaired.  Judgment: Baseline impaired  Attention/Concentration: Needing structure and prompts.  [place].  Language: Baseline impaired  Fund of Knowledge:  .    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        Other:   Patient will return to clinic in  3-4 months. They agree to call or return sooner with any questions or concerns.  Risks and benefits were discussed.  Continue with his behavioral analyst.     Continue with the support of the clinic, reassurance, and redirection. Staff monitoring and ongoing assessments per team plan. Current psychotropic medication appears to represent the minimum effective dosage and appears medically necessary. We will continue to monitor and reassess. This team will utilize appropriate emergency services if necessary. I will make myself available if concerns or problems arise.      Robin Parker MD      Again, thank you for allowing me to participate in the care of your patient.        Sincerely,        Robin Parker MD

## 2023-05-30 ENCOUNTER — TELEPHONE (OUTPATIENT)
Dept: NEUROLOGY | Facility: CLINIC | Age: 38
End: 2023-05-30
Payer: MEDICARE

## 2023-05-30 DIAGNOSIS — S06.9XAS MOOD DISORDER AS LATE EFFECT OF TRAUMATIC BRAIN INJURY (H): ICD-10-CM

## 2023-05-30 DIAGNOSIS — F06.30 MOOD DISORDER AS LATE EFFECT OF TRAUMATIC BRAIN INJURY (H): ICD-10-CM

## 2023-05-30 NOTE — TELEPHONE ENCOUNTER
[facility] Call Center    Phone Message    May a detailed message be left on voicemail: yes     Reason for Call: Medication Question or concern regarding medication   Prescription Clarification  Name of Medication: QUEtiapine (SEROQUEL) 25 MG tablet  Prescribing Provider: Keith   Pharmacy: [facility]   What on the order needs clarification?     Patients father is calling to discuss recent change in Seroquel prescription. Patient has always been on extended release tablet and medication wasn't refilled that way. Medication has not been picked up due to not being extended release.     Please contact patients father or mother to discuss at [phone]          Action Taken: Message routed to:  Other: [facility]    Travel Screening: Not Applicable

## 2023-05-31 RX ORDER — QUETIAPINE FUMARATE 50 MG/1
50 TABLET, EXTENDED RELEASE ORAL AT BEDTIME
Qty: 30 TABLET | Refills: 5 | Status: SHIPPED | OUTPATIENT
Start: 2023-05-31 | End: 2023-11-02

## 2023-05-31 NOTE — TELEPHONE ENCOUNTER
Called patients father Arturo, he states that pt has always taken quetiapine 50mg XR at bedtime.     Called provider, he gave verbal order for RN to cancel IR quetiapine 50mg and order quetiapine 50mg XR 1 tablet by mouth at bedtime. Qty 30 with 5 refills.     Rx order placed and old order cancelled. Arturo informed.     Enoch Miranda RN, BSN  [facility]

## 2023-09-26 NOTE — PROGRESS NOTES
Patient's impression of how medication is working? Pt meds are good, pt was told to reduce his caffeine intake because it causes behavior issues.    Compliant with Medication? yes    Side Effects: None    Current Symptoms : No    Pain (0-10) No  Appetite change No  Negative thoughts Yes  Alcohol use No  Drug use No  Mood swings Yes  Sleep disturbance No  Change in interest No  Change in energy No  Change in concentration No  Psychosis/Hallucinations No  Anxiety none  Sad/depressed mood moderate       127

## 2023-10-31 DIAGNOSIS — F06.30 MOOD DISORDER AS LATE EFFECT OF TRAUMATIC BRAIN INJURY (H): ICD-10-CM

## 2023-10-31 DIAGNOSIS — S06.9XAS MOOD DISORDER AS LATE EFFECT OF TRAUMATIC BRAIN INJURY (H): ICD-10-CM

## 2023-11-01 NOTE — TELEPHONE ENCOUNTER
Refill request for clonazePAM (KLONOPIN) 1 MG tablet   Last follow-up 5/25/23; next follow-up 11/2/23  Medication T'd for review and signature  Manjinder JONAS ATC on 11/1/2023 at 8:39 AM    clonazePAM (KLONOPIN) 1 MG tablet 120 tablet 3 5/25/2023  No   Sig - Route: Take 1 tablet (1 mg) by mouth 4 times daily - Oral

## 2023-11-02 ENCOUNTER — VIRTUAL VISIT (OUTPATIENT)
Dept: NEUROLOGY | Facility: CLINIC | Age: 38
End: 2023-11-02
Payer: MEDICARE

## 2023-11-02 DIAGNOSIS — F06.30 MOOD DISORDER AS LATE EFFECT OF TRAUMATIC BRAIN INJURY (H): ICD-10-CM

## 2023-11-02 DIAGNOSIS — S06.9XAS MOOD DISORDER AS LATE EFFECT OF TRAUMATIC BRAIN INJURY (H): ICD-10-CM

## 2023-11-02 PROCEDURE — 99213 OFFICE O/P EST LOW 20 MIN: CPT | Mod: 95 | Performed by: PSYCHIATRY & NEUROLOGY

## 2023-11-02 RX ORDER — DESVENLAFAXINE 25 MG/1
TABLET, EXTENDED RELEASE ORAL
Qty: 30 TABLET | Refills: 3 | Status: SHIPPED | OUTPATIENT
Start: 2023-11-02 | End: 2024-03-07

## 2023-11-02 RX ORDER — CLONAZEPAM 1 MG/1
1 TABLET ORAL 4 TIMES DAILY
Qty: 120 TABLET | Refills: 0 | Status: SHIPPED | OUTPATIENT
Start: 2023-11-02 | End: 2024-01-04

## 2023-11-02 RX ORDER — CLONAZEPAM 1 MG/1
1 TABLET ORAL 4 TIMES DAILY
Qty: 120 TABLET | Refills: 0 | Status: SHIPPED | OUTPATIENT
Start: 2023-11-02 | End: 2023-11-02

## 2023-11-02 RX ORDER — DESVENLAFAXINE 100 MG/1
100 TABLET, EXTENDED RELEASE ORAL EVERY MORNING
Qty: 30 TABLET | Refills: 3 | Status: SHIPPED | OUTPATIENT
Start: 2023-11-02 | End: 2024-04-18

## 2023-11-02 RX ORDER — QUETIAPINE FUMARATE 25 MG/1
25 TABLET, FILM COATED ORAL 3 TIMES DAILY PRN
Qty: 90 TABLET | Refills: 3 | Status: SHIPPED | OUTPATIENT
Start: 2023-11-02 | End: 2024-04-18

## 2023-11-02 ASSESSMENT — PAIN SCALES - GENERAL: PAINLEVEL: NO PAIN (0)

## 2023-11-02 NOTE — PATIENT INSTRUCTIONS
No significant change.  The patient seems to be tolerating the current medication.  We will continue with the current treatment plan the patient will return to this clinic in 3 to 4 months for medication check.  The family agrees to call before then with any questions or concerns.

## 2023-11-02 NOTE — LETTER
11/2/2023         RE: Robin Blum  [id number] [facility] [address]        Dear Colleague,    Thank you for referring your patient, Robin Blum, to the [facility] NEUROLOGY CLINIC [facility]. Please see a copy of my visit note below.    Virtual Visit Details    Type of service:  Video Visit   Video Start Time: 12:58 PM  Video End Time:1:15 PM    Originating Location (pt. Location): Patient's home  Distant Location (provider location):  My Office  Platform used for Video Visit: [employer]        Outpatient Follow up TBI Evaluation     Pertinent History: The patient was referred to this clinic for further assessment and treatment .The patient has a history of significant cognitive mood and behavioral difficulties related to a prior traumatic brain injury.  After my April 2016 visit he had been followed by the nurse practitioner but reestablish contact with my clinic in October 2018.     Patient last saw the nurse practitioner and June 2018.  The patient was a bit more sedated at that time following a recent change in his Seroquel to extended release due to some increased agitation.  At that visit low-dose daytime Seroquel was added.     Patient is followed by therapist Ivet Rizo and last saw the therapist September 2018.  He moved to a new group home in 2018 and seemed to like that.  He seemed to be doing fairly well at that time.     I saw the patient in October 2018.  The patient was doing relatively well at that time and I did not make any psychotropic medication changes.     I saw the patient in January 2019.  At that time he was communicating a bit more and using gestures.  He continued to be frustrated with occasional conflict with caregivers.  We did not make any medication changes at that time.     I saw the patient in August 2019.  I met with the patient's mother and father for 20 minutes prior to that appointment.  They had some concerns about the patient's presentation and  his cares.  They felt he was spending too much time on the iPad.  We did address this with the group home staff.  We made no medication changes at that time.  The patient moved back home with his mom and dad on October 30.  That went quite well for a week or so but then the patient began showing more behavioral issues.  Shortly before the November 2019 meeting I did increase the patient's PRN Seroquel in the context of been increased behavioral issues and refusals of multiple medications.     I saw the patient in November 2019.  The patient had been at his current group home for about and was having some behaviors possibly related to adjustment.  The PRN Seroquel had been somewhat helpful.  There was questions as to whether the patient could benefit from a behavioral analyst and they were going to pursue that as well.      I saw the patient in November 2019. He was living in his parents house at that time. He was doing fairly well but had occasional outbursts. Oftentimes these were directed towards his father in frustration. They were starting with a behavioral analyst to begin working on those issues.     I saw the patient in September 2020.  He was doing relatively well at that time and tolerating them change in group homes.  We continued his medication regime.     The patient in March 2020.  He was doing fairly well at that time and was attending speech therapy which was reportedly helpful.  We elected to continue with his current treatment plan at that time and did not make any changes.     Saw the patient in June 2021.  He continued to make progress in therapies.  He was becoming stronger and was dressing himself.  He still had periods where he was upset and refused cares.  He was working with the behavioral analyst and that seem to be helping.  We did not make any medication changes at that visit.     The patient was seen in October 2021.  I also interviewed his mother and father at that time.  The patient was  isolating a bit but was getting back on a more regular schedule.  We had considered an increase in the Pristiq.  He was tolerating the medication.  We continue with the current treatment plan at that time and did not make any changes.     The patient was seen in early February 2022.  He was doing well at that time and they were trying to set some limits on screen time which seemed to be associated with some behavioral outbursts.  He was sleeping well and medically stable.  We continued with the treatment plan at that time.     The patient was seen in May 2022.  He was doing relatively well at that time but continued with occasional irritability and probable underlying depression.  We talked about possibly increasing the Pristiq but the family wanted to continue with the current treatment plan and we did not make changes.    The patient was seen on August 18, 2022 when he was doing well at that time.  His father felt that his cognition was actually improving and the Pristiq was helpful.  We increased the Pristiq to 125 mg a day as there was still room for improvement but behaviorally things were better.  No other new medical issues or concerns.       The patient was seen for follow-up in November 2022.  He was doing relatively well at that time and was tolerating the medication.  We did not make any medication changes.    The patient returned for a visit in May.  He was doing fairly well at that time with no significant change and he was tolerating the medication.  We elected to continue with the treatment plan.      HPI:  Patient presents today for the purposes of medication management.  I had an opportunity to interview the patient along with his father today.  Both reported no significant change and things were going relatively well although he continues to get easily upset and they gave me some situations where he has response was out of proportion for the situation but no significant dangerousness.  No  hallucinations or delusions.  No dov.  He seemed to be tolerating the medication with no new abnormal movements or problems.  There was no change in his cognition.  Overall they were pleased with the current plan.  We again discussed risks and benefits of the medication as well as sequela from brain injury.  They were in agreement with the plan.     We discussed some treatment options and have elected to continue with the current treatment plan.     Current Medications: Please see chart. Medications personally reviewed.        Review of Systems  A comprehensive review of systems was negative except for what is noted above     Mental Status Exam  Appearance: Patient appears relatively calm at this time.  No obvious pain or shortness of breath.  No obvious discomfort.  Behavior:  Able to to participate but a bit slow.  No obvious agitation or distress.  Not currently restless.  No reports of any recent significant behavioral dyscontrol.  Speech: The patient again was mute throughout the interview but maintained eye contact and gave me thumbs up and thumbs down on rare occasions regarding the questions.  Mood/Affect: No significant depression or anxiety.  He was smiling throughout the interview.  No dov.  Not irritable.  Not agitated or labile.  No reports of any significant recent change.  Thought Content:  No evidence of any psychosis. No reports of any recent psychosis.  Suicidal or Homicidal Thoughts: No reported or apparent suicidal or homicidal ideation.  Thought Process/Formulation: Participating a bit.  Able to track and follow some conversation.  No obvious racing thoughts.  Not significantly disorganized.  Somewhat concrete.  No reports of any significant recent change.  Associations: Processing some information but does need structure and support. No reports of any significant recent change.  Fund of Knowledge: Continues impaired by report.    Attention/Concentration:  Attentive.  He does need prompts  and structure however.  No reports of any significant change.  [place] and slow.  Following and tracking with some prompting.  Concentration remains impaired.  Insight: Impaired.  No obvious recent change.    Judgement: Impaired.  No reports of any recent change  Memory:  Grossly impaired.  No obvious recent change.  Needing prompts and structure.   Motor Status:  No recent change reported. No current tremor.  Orientation: No reports of any significant recent change.  Still impaired.  No apparent significant recent change....        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        Other:   Patient will return to clinic in  3-4 months. They agree to call or return sooner with any questions or concerns.  Risks and benefits were discussed.  Continue with his behavioral analyst.     Continue with the support of the clinic, reassurance, and redirection. Staff monitoring and ongoing assessments per team plan. Current psychotropic medication appears to represent the minimum effective dosage and appears medically necessary. We will continue to monitor and reassess. This team will utilize appropriate emergency services if necessary. I will make myself available if concerns or problems arise.      Robin Parker MD      Again, thank you for allowing me to participate in the care of your patient.        Sincerely,        Robin Parker MD

## 2023-11-02 NOTE — PROGRESS NOTES
Virtual Visit Details    Type of service:  Video Visit   Video Start Time: 12:58 PM  Video End Time:1:15 PM    Originating Location (pt. Location): Patient's home  Distant Location (provider location):  My Office  Platform used for Video Visit: [employer]        Outpatient Follow up TBI Evaluation     Pertinent History: The patient was referred to this clinic for further assessment and treatment .The patient has a history of significant cognitive mood and behavioral difficulties related to a prior traumatic brain injury.  After my April 2016 visit he had been followed by the nurse practitioner but reestablish contact with my clinic in October 2018.     Patient last saw the nurse practitioner and June 2018.  The patient was a bit more sedated at that time following a recent change in his Seroquel to extended release due to some increased agitation.  At that visit low-dose daytime Seroquel was added.     Patient is followed by therapist Ivet Rizo and last saw the therapist September 2018.  He moved to a new group home in 2018 and seemed to like that.  He seemed to be doing fairly well at that time.     I saw the patient in October 2018.  The patient was doing relatively well at that time and I did not make any psychotropic medication changes.     I saw the patient in January 2019.  At that time he was communicating a bit more and using gestures.  He continued to be frustrated with occasional conflict with caregivers.  We did not make any medication changes at that time.     I saw the patient in August 2019.  I met with the patient's mother and father for 20 minutes prior to that appointment.  They had some concerns about the patient's presentation and his cares.  They felt he was spending too much time on the iPad.  We did address this with the group home staff.  We made no medication changes at that time.  The patient moved back home with his mom and dad on October 30.  That went quite well for a week or so but  then the patient began showing more behavioral issues.  Shortly before the November 2019 meeting I did increase the patient's PRN Seroquel in the context of been increased behavioral issues and refusals of multiple medications.     I saw the patient in November 2019.  The patient had been at his current group home for about and was having some behaviors possibly related to adjustment.  The PRN Seroquel had been somewhat helpful.  There was questions as to whether the patient could benefit from a behavioral analyst and they were going to pursue that as well.      I saw the patient in November 2019. He was living in his parents house at that time. He was doing fairly well but had occasional outbursts. Oftentimes these were directed towards his father in frustration. They were starting with a behavioral analyst to begin working on those issues.     I saw the patient in September 2020.  He was doing relatively well at that time and tolerating them change in group homes.  We continued his medication regime.     The patient in March 2020.  He was doing fairly well at that time and was attending speech therapy which was reportedly helpful.  We elected to continue with his current treatment plan at that time and did not make any changes.     Saw the patient in June 2021.  He continued to make progress in therapies.  He was becoming stronger and was dressing himself.  He still had periods where he was upset and refused cares.  He was working with the behavioral analyst and that seem to be helping.  We did not make any medication changes at that visit.     The patient was seen in October 2021.  I also interviewed his mother and father at that time.  The patient was isolating a bit but was getting back on a more regular schedule.  We had considered an increase in the Pristiq.  He was tolerating the medication.  We continue with the current treatment plan at that time and did not make any changes.     The patient was seen in  early February 2022.  He was doing well at that time and they were trying to set some limits on screen time which seemed to be associated with some behavioral outbursts.  He was sleeping well and medically stable.  We continued with the treatment plan at that time.     The patient was seen in May 2022.  He was doing relatively well at that time but continued with occasional irritability and probable underlying depression.  We talked about possibly increasing the Pristiq but the family wanted to continue with the current treatment plan and we did not make changes.    The patient was seen on August 18, 2022 when he was doing well at that time.  His father felt that his cognition was actually improving and the Pristiq was helpful.  We increased the Pristiq to 125 mg a day as there was still room for improvement but behaviorally things were better.  No other new medical issues or concerns.       The patient was seen for follow-up in November 2022.  He was doing relatively well at that time and was tolerating the medication.  We did not make any medication changes.    The patient returned for a visit in May.  He was doing fairly well at that time with no significant change and he was tolerating the medication.  We elected to continue with the treatment plan.      HPI:  Patient presents today for the purposes of medication management.  I had an opportunity to interview the patient along with his father today.  Both reported no significant change and things were going relatively well although he continues to get easily upset and they gave me some situations where he has response was out of proportion for the situation but no significant dangerousness.  No hallucinations or delusions.  No dov.  He seemed to be tolerating the medication with no new abnormal movements or problems.  There was no change in his cognition.  Overall they were pleased with the current plan.  We again discussed risks and benefits of the medication  as well as sequela from brain injury.  They were in agreement with the plan.     We discussed some treatment options and have elected to continue with the current treatment plan.     Current Medications: Please see chart. Medications personally reviewed.        Review of Systems  A comprehensive review of systems was negative except for what is noted above     Mental Status Exam  Appearance: Patient appears relatively calm at this time.  No obvious pain or shortness of breath.  No obvious discomfort.  Behavior:  Able to to participate but a bit slow.  No obvious agitation or distress.  Not currently restless.  No reports of any recent significant behavioral dyscontrol.  Speech: The patient again was mute throughout the interview but maintained eye contact and gave me thumbs up and thumbs down on rare occasions regarding the questions.  Mood/Affect: No significant depression or anxiety.  He was smiling throughout the interview.  No dov.  Not irritable.  Not agitated or labile.  No reports of any significant recent change.  Thought Content:  No evidence of any psychosis. No reports of any recent psychosis.  Suicidal or Homicidal Thoughts: No reported or apparent suicidal or homicidal ideation.  Thought Process/Formulation: Participating a bit.  Able to track and follow some conversation.  No obvious racing thoughts.  Not significantly disorganized.  Somewhat concrete.  No reports of any significant recent change.  Associations: Processing some information but does need structure and support. No reports of any significant recent change.  Fund of Knowledge: Continues impaired by report.    Attention/Concentration:  Attentive.  He does need prompts and structure however.  No reports of any significant change.  [place] and slow.  Following and tracking with some prompting.  Concentration remains impaired.  Insight: Impaired.  No obvious recent change.    Judgement: Impaired.  No reports of any recent change  Memory:   Grossly impaired.  No obvious recent change.  Needing prompts and structure.   Motor Status:  No recent change reported. No current tremor.  Orientation: No reports of any significant recent change.  Still impaired.  No apparent significant recent change....        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        Other:   Patient will return to clinic in  3-4 months. They agree to call or return sooner with any questions or concerns.  Risks and benefits were discussed.  Continue with his behavioral analyst.     Continue with the support of the clinic, reassurance, and redirection. Staff monitoring and ongoing assessments per team plan. Current psychotropic medication appears to represent the minimum effective dosage and appears medically necessary. We will continue to monitor and reassess. This team will utilize appropriate emergency services if necessary. I will make myself available if concerns or problems arise.      Robin Parker MD

## 2023-11-30 ENCOUNTER — TELEPHONE (OUTPATIENT)
Dept: NEUROLOGY | Facility: CLINIC | Age: 38
End: 2023-11-30
Payer: MEDICARE

## 2023-11-30 DIAGNOSIS — F06.30 MOOD DISORDER AS LATE EFFECT OF TRAUMATIC BRAIN INJURY (H): Primary | ICD-10-CM

## 2023-11-30 DIAGNOSIS — S06.9XAS MOOD DISORDER AS LATE EFFECT OF TRAUMATIC BRAIN INJURY (H): Primary | ICD-10-CM

## 2023-11-30 NOTE — TELEPHONE ENCOUNTER
[facility] Call Center    Phone Message    May a detailed message be left on voicemail: yes     Reason for Call: Medication Question or concern regarding medication   Prescription Clarification  Name of Medication: QUEtiapine (SEROQUEL) 25 MG tablet   Prescribing Provider:        What on the order needs clarification? Pat patients mother called to speak to care team regarding patients medication QUEtiapine (SEROQUEL) 25 MG tablet , please call back to discuss.       Action Taken: Other: wb neurology    Travel Screening: Not Applicable

## 2023-11-30 NOTE — TELEPHONE ENCOUNTER
Hayden Mckenzie,    Can you double check with me on the refill request for QUEtiapine (SEROQUEL XR) 50 MG TB24 24 hr tablet?    If I am correct, Dr. Parker discontinued the 50 MG on 11/2/23 and sent a new Rx for QUEtiapine (SEROQUEL) 25 MG tablet.    If correct, please deny this request. Thank you.      NARAYAN Meyer on 11/30/2023 at 11:47 AM

## 2023-12-01 RX ORDER — QUETIAPINE FUMARATE 50 MG/1
50 TABLET, EXTENDED RELEASE ORAL AT BEDTIME
Qty: 90 TABLET | Refills: 0 | Status: SHIPPED | OUTPATIENT
Start: 2023-12-01 | End: 2024-04-18

## 2023-12-01 NOTE — TELEPHONE ENCOUNTER
Rx was sent to the pharmacy today by Dr. Parker for the QUEtiapine (SEROQUEL XR) 50 MG TB24 24 hr tablet     NARAYAN Meyer on 12/1/2023 at 1:41 PM

## 2023-12-01 NOTE — TELEPHONE ENCOUNTER
Refill encounter from 12/1 has been addressed for QUEtiapine (SEROQUEL XR) 50 MG TB24 24 hr tablet, which was discontinued in error. Provider has sent Rx into pharmacy.    Writer called and made patient's mother aware.    Jonah Groves RN, BSN  [facility] Neurology

## 2023-12-01 NOTE — TELEPHONE ENCOUNTER
Dr. Parker,    We received a refill for QUEtiapine (SEROQUEL XR) 50 MG TB24 24 hr tablet but it looks like this has been discontinued on 11/2/2023 and you wrote a new Rx for QUEtiapine (SEROQUEL) 25 MG tablet.    Can you please verify which is the correct dose the pt is supposed to be taking?     I checked the pt's chart and I don't see any notes indicating the changes to the meds.    In addition, pt's mother is calling clinic asking about the QUEtiapine (SEROQUEL) 25 MG tablet.    Please advise. Thank you.    NARAYAN Meyer on 12/1/2023 at 12:08 PM

## 2024-01-04 DIAGNOSIS — F06.30 MOOD DISORDER AS LATE EFFECT OF TRAUMATIC BRAIN INJURY (H): ICD-10-CM

## 2024-01-04 DIAGNOSIS — S06.9XAS MOOD DISORDER AS LATE EFFECT OF TRAUMATIC BRAIN INJURY (H): ICD-10-CM

## 2024-01-04 RX ORDER — CLONAZEPAM 1 MG/1
1 TABLET ORAL 4 TIMES DAILY
Qty: 120 TABLET | Refills: 3 | Status: SHIPPED | OUTPATIENT
Start: 2024-01-04 | End: 2024-04-18

## 2024-01-04 NOTE — TELEPHONE ENCOUNTER
Refill request for the following medication (s) listed below.    Pending Prescriptions:                       Disp   Refills    clonazePAM (KLONOPIN) 1 MG tablet [Pharma*120 ta*3            Sig: Take 1 tablet (1 mg) by mouth 4 times daily      Last office visit provider:  11/2/23  Next appointment scheduled: None. Not due for appt till 3/2024      Medication T'd for review and signature        NARAYAN Meyer on 1/4/2024 at 11:00 AM

## 2024-03-05 DIAGNOSIS — F06.30 MOOD DISORDER AS LATE EFFECT OF TRAUMATIC BRAIN INJURY (H): ICD-10-CM

## 2024-03-05 DIAGNOSIS — S06.9XAS MOOD DISORDER AS LATE EFFECT OF TRAUMATIC BRAIN INJURY (H): ICD-10-CM

## 2024-03-06 ENCOUNTER — TELEPHONE (OUTPATIENT)
Dept: NEUROLOGY | Facility: CLINIC | Age: 39
End: 2024-03-06
Payer: MEDICARE

## 2024-03-06 NOTE — TELEPHONE ENCOUNTER
Hello,    Please call pt to schedule for next available follow up appt with Dr. Parker.    Thank you,  Capri Linares MA on 3/6/2024 at 11:08 AM

## 2024-03-06 NOTE — TELEPHONE ENCOUNTER
Refill request for the following medication (s) listed below.    Pending Prescriptions:                       Disp   Refills    desvenlafaxine succinate (PRISTIQ) 25 MG *30 tab*0            Sig: TAKE 1 TABLET BY MOUTH ONE TIME DAILY WITH A 100           MG TABLET FOR A TOTAL  MG      Last office visit provider:  11/02/23  Next appointment scheduled: None. MSG sent to  to call pt for an appt.      Medication T'd for review and signature  Capri Linares MA on 3/6/2024 at 11:05 AM

## 2024-03-07 RX ORDER — DESVENLAFAXINE 25 MG/1
TABLET, EXTENDED RELEASE ORAL
Qty: 30 TABLET | Refills: 0 | Status: SHIPPED | OUTPATIENT
Start: 2024-03-07 | End: 2024-04-05

## 2024-03-20 NOTE — TELEPHONE ENCOUNTER
NEXT APPT  With Neurology (Robin Parker MD)  04/18/2024 at 12:20 PM    Capri Linares MA on 3/20/2024 at 10:02 AM

## 2024-04-05 DIAGNOSIS — S06.9XAS MOOD DISORDER AS LATE EFFECT OF TRAUMATIC BRAIN INJURY (H): ICD-10-CM

## 2024-04-05 DIAGNOSIS — F06.30 MOOD DISORDER AS LATE EFFECT OF TRAUMATIC BRAIN INJURY (H): ICD-10-CM

## 2024-04-05 RX ORDER — DESVENLAFAXINE 25 MG/1
TABLET, EXTENDED RELEASE ORAL
Qty: 30 TABLET | Refills: 0 | Status: SHIPPED | OUTPATIENT
Start: 2024-04-05 | End: 2024-04-18

## 2024-04-05 NOTE — TELEPHONE ENCOUNTER
Refill request for the following medication (s) listed below.    Pending Prescriptions:                       Disp   Refills    desvenlafaxine succinate (PRISTIQ) 25 MG *30 tab*0            Sig: TAKE 1 TABLET BY MOUTH ONE TIME DAILY WITH A 100           MG TABLET FOR A TOTAL  MG      Last office visit provider:  11/02/23  Next appointment scheduled: 04/18/24      Medication T'd for review and signature  Capri Linares MA on 4/5/2024 at 11:55 AM

## 2024-04-18 ENCOUNTER — TELEPHONE (OUTPATIENT)
Dept: NEUROLOGY | Facility: CLINIC | Age: 39
End: 2024-04-18

## 2024-04-18 ENCOUNTER — VIRTUAL VISIT (OUTPATIENT)
Dept: NEUROLOGY | Facility: CLINIC | Age: 39
End: 2024-04-18
Payer: MEDICARE

## 2024-04-18 DIAGNOSIS — F06.30 MOOD DISORDER AS LATE EFFECT OF TRAUMATIC BRAIN INJURY (H): ICD-10-CM

## 2024-04-18 DIAGNOSIS — S06.9XAS MOOD DISORDER AS LATE EFFECT OF TRAUMATIC BRAIN INJURY (H): ICD-10-CM

## 2024-04-18 PROCEDURE — 99214 OFFICE O/P EST MOD 30 MIN: CPT | Mod: 95 | Performed by: PSYCHIATRY & NEUROLOGY

## 2024-04-18 RX ORDER — DESVENLAFAXINE 25 MG/1
TABLET, EXTENDED RELEASE ORAL
Qty: 30 TABLET | Refills: 0 | Status: SHIPPED | OUTPATIENT
Start: 2024-04-18 | End: 2024-06-08

## 2024-04-18 RX ORDER — DESVENLAFAXINE 100 MG/1
100 TABLET, EXTENDED RELEASE ORAL EVERY MORNING
Qty: 30 TABLET | Refills: 3 | Status: SHIPPED | OUTPATIENT
Start: 2024-04-18 | End: 2024-07-25

## 2024-04-18 RX ORDER — CLONAZEPAM 1 MG/1
1 TABLET ORAL 4 TIMES DAILY
Qty: 120 TABLET | Refills: 3 | Status: SHIPPED | OUTPATIENT
Start: 2024-04-18

## 2024-04-18 RX ORDER — QUETIAPINE FUMARATE 25 MG/1
25 TABLET, FILM COATED ORAL 3 TIMES DAILY PRN
Qty: 90 TABLET | Refills: 3 | Status: SHIPPED | OUTPATIENT
Start: 2024-04-18 | End: 2024-07-25

## 2024-04-18 RX ORDER — QUETIAPINE FUMARATE 50 MG/1
50 TABLET, EXTENDED RELEASE ORAL AT BEDTIME
Qty: 90 TABLET | Refills: 0 | Status: SHIPPED | OUTPATIENT
Start: 2024-04-18 | End: 2024-07-25

## 2024-04-18 ASSESSMENT — PAIN SCALES - GENERAL: PAINLEVEL: NO PAIN (0)

## 2024-04-18 NOTE — TELEPHONE ENCOUNTER
Hello, patient had a virtual appointment today with Dr. Parker. Please assist with getting the patient scheduled for their follow-up appt. Checkout notes below.     Return to this clinic in 3 months. Due around 07/18/2024.         Thank you!  Sera Pretty, Virtual Visit Facilitator

## 2024-04-18 NOTE — NURSING NOTE
Is the patient currently in the state of [place]? YES    Visit mode:VIDEO    If the visit is dropped, the patient can be reconnected by: VIDEO VISIT: Text to cell phone:   Telephone Information:   Mobile [phone]       Will anyone else be joining the visit? NO  (If patient encounters technical issues they should call [phone])    How would you like to obtain your AVS? MyChart    Are changes needed to the allergy or medication list? No    Are refills needed on medications prescribed by this physician? NO    Reason for visit: Follow-up     Sera DEL RIO

## 2024-04-18 NOTE — LETTER
4/18/2024         RE: Robin Blum  [address] [facility] [address]        Dear Colleague,    Thank you for referring your patient, Robin Blum, to the [facility]. Please see a copy of my visit note below.    Virtual Visit Details    Type of service:  Video Visit   Video Start Time: 12:25 PM  Video End Time: 12:35 PM    Originating Location (pt. Location):  Patient's home   distant Location (provider location):  My office  Platform used for Video Visit: [employer]    Outpatient Follow up TBI Evaluation     Pertinent History: The patient was referred to this clinic for further assessment and treatment .The patient has a history of significant cognitive mood and behavioral difficulties related to a prior traumatic brain injury.  After my April 2016 visit he had been followed by the nurse practitioner but reestablish contact with my clinic in October 2018.     Patient last saw the nurse practitioner and June 2018.  The patient was a bit more sedated at that time following a recent change in his Seroquel to extended release due to some increased agitation.  At that visit low-dose daytime Seroquel was added.     Patient is followed by therapist Ivet Rizo and last saw the therapist September 2018.  He moved to a new group home in 2018 and seemed to like that.  He seemed to be doing fairly well at that time.     I saw the patient in October 2018.  The patient was doing relatively well at that time and I did not make any psychotropic medication changes.     I saw the patient in January 2019.  At that time he was communicating a bit more and using gestures.  He continued to be frustrated with occasional conflict with caregivers.  We did not make any medication changes at that time.     I saw the patient in August 2019.  I met with the patient's mother and father for 20 minutes prior to that appointment.  They had some concerns about the patient's presentation and  his cares.  They felt he was spending too much time on the iPad.  We did address this with the group home staff.  We made no medication changes at that time.  The patient moved back home with his mom and dad on October 30.  That went quite well for a week or so but then the patient began showing more behavioral issues.  Shortly before the November 2019 meeting I did increase the patient's PRN Seroquel in the context of been increased behavioral issues and refusals of multiple medications.     I saw the patient in November 2019.  The patient had been at his current group home for about and was having some behaviors possibly related to adjustment.  The PRN Seroquel had been somewhat helpful.  There was questions as to whether the patient could benefit from a behavioral analyst and they were going to pursue that as well.      I saw the patient in November 2019. He was living in his parents house at that time. He was doing fairly well but had occasional outbursts. Oftentimes these were directed towards his father in frustration. They were starting with a behavioral analyst to begin working on those issues.     I saw the patient in September 2020.  He was doing relatively well at that time and tolerating them change in group homes.  We continued his medication regime.     The patient in March 2020.  He was doing fairly well at that time and was attending speech therapy which was reportedly helpful.  We elected to continue with his current treatment plan at that time and did not make any changes.     Saw the patient in June 2021.  He continued to make progress in therapies.  He was becoming stronger and was dressing himself.  He still had periods where he was upset and refused cares.  He was working with the behavioral analyst and that seem to be helping.  We did not make any medication changes at that visit.     The patient was seen in October 2021.  I also interviewed his mother and father at that time.  The patient was  isolating a bit but was getting back on a more regular schedule.  We had considered an increase in the Pristiq.  He was tolerating the medication.  We continue with the current treatment plan at that time and did not make any changes.     The patient was seen in early February 2022.  He was doing well at that time and they were trying to set some limits on screen time which seemed to be associated with some behavioral outbursts.  He was sleeping well and medically stable.  We continued with the treatment plan at that time.     The patient was seen in May 2022.  He was doing relatively well at that time but continued with occasional irritability and probable underlying depression.  We talked about possibly increasing the Pristiq but the family wanted to continue with the current treatment plan and we did not make changes.    The patient was seen on August 18, 2022 when he was doing well at that time.  His father felt that his cognition was actually improving and the Pristiq was helpful.  We increased the Pristiq to 125 mg a day as there was still room for improvement but behaviorally things were better.  No other new medical issues or concerns.       The patient was seen for follow-up in November 2022.  He was doing relatively well at that time and was tolerating the medication.  We did not make any medication changes.    The patient returned for a visit in May.  He was doing fairly well at that time with no significant change and he was tolerating the medication.  We elected to continue with the treatment plan.    The patient returned to this clinic in November 2023.  He is doing well and continues to follow by the medical team and neurologist.  There were no significant behavioral issues and no concerns.  He denies any medication changes.      HPI:  Patient presents today for the purposes of medication management.   The patient was unable participate much with the father provided history and stated that making slow  progress forward.  He is now working with a therapist from speech as well as medical transplant  Very well.  He may take a break from station utilizing the usage of eyes.  He goes to his brother's work once a week and he enjoys that.  He continues to be followed by his medical team.  Neurology and I did review the labs. No change in his medical status.  He is sleeping well and eating well.  No significant behavior problems.  No depression.  No psychosis.  Continue with current treatment plan.     We discussed some treatment options and have elected to continue with the current treatment plan.     Current Medications: Please see chart. Medications personally reviewed.        Review of Systems  A comprehensive review of systems was negative except for what is noted above     Mental Status Exam  Appearance:   No apparent distress, per dad.  Not restless or agitated.  No shortness of breath.  Behavior: No reports of any recent behavioral changes.  The patient is currently calm.  No agitation.  Not restless.  Speech: No change.  Not able participate today but vague and slow.  Mood/Affect: Not significantly depressed or anxious at this time.  No obvious frustration or irritability. No lability.  No irritability.  Mood seems stable.  Thought Content: No reports of any psychosis.  No evidence of any psychosis at this time.  Suicidal or Homicidal Thoughts: No reports of any suicidal or homicidal ideation or behavior.  Thought Process/Formulation: Impaired.  Somewhat slow and concrete.   Somewhat vague.  No evidence of any racing thoughts.  Associations: Continues impaired.  Fund of Knowledge:  Reports are that the patient continues with some impairment. No obvious change on interview.  Attention/Concentration: Able to attend to and track. Concentration impaired.  No reports of any significant recent change.  No racing thoughts.  The patient continues vague and concrete and slow.  Insight:  Impaired. No reports of any recent  significant change.   Judgement:  Grossly impaired.  No obvious recent change  Memory: Continues impaired.  Does need prompts.  Does need structure.    Motor Status:  No recent change reported. No current tremor.  Orientation: Impaired.          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 They agree to call or return sooner with any questions or concerns.  Risks and benefits were discussed.  Continue with his behavioral analyst.     Continue with the support of the clinic, reassurance, and redirection. Staff monitoring and ongoing assessments per team plan. Current psychotropic medication appears to represent the minimum effective dosage and appears medically necessary. We will continue to monitor and reassess. This team will utilize appropriate emergency services if necessary. I will make myself available if concerns or problems arise.      Robin Parker MD      Again, thank you for allowing me to participate in the care of your patient.        Sincerely,        Robin Parker MD

## 2024-04-18 NOTE — PROGRESS NOTES
Virtual Visit Details    Type of service:  Video Visit   Video Start Time: 12:25 PM  Video End Time: 12:35 PM    Originating Location (pt. Location):  Patient's home   distant Location (provider location):  My office  Platform used for Video Visit: [employer]    Outpatient Follow up TBI Evaluation     Pertinent History: The patient was referred to this clinic for further assessment and treatment .The patient has a history of significant cognitive mood and behavioral difficulties related to a prior traumatic brain injury.  After my April 2016 visit he had been followed by the nurse practitioner but reestablish contact with my clinic in October 2018.     Patient last saw the nurse practitioner and June 2018.  The patient was a bit more sedated at that time following a recent change in his Seroquel to extended release due to some increased agitation.  At that visit low-dose daytime Seroquel was added.     Patient is followed by therapist Ivet Rizo and last saw the therapist September 2018.  He moved to a new group home in 2018 and seemed to like that.  He seemed to be doing fairly well at that time.     I saw the patient in October 2018.  The patient was doing relatively well at that time and I did not make any psychotropic medication changes.     I saw the patient in January 2019.  At that time he was communicating a bit more and using gestures.  He continued to be frustrated with occasional conflict with caregivers.  We did not make any medication changes at that time.     I saw the patient in August 2019.  I met with the patient's mother and father for 20 minutes prior to that appointment.  They had some concerns about the patient's presentation and his cares.  They felt he was spending too much time on the iPad.  We did address this with the group home staff.  We made no medication changes at that time.  The patient moved back home with his mom and dad on October 30.  That went quite well for a week or so but  then the patient began showing more behavioral issues.  Shortly before the November 2019 meeting I did increase the patient's PRN Seroquel in the context of been increased behavioral issues and refusals of multiple medications.     I saw the patient in November 2019.  The patient had been at his current group home for about and was having some behaviors possibly related to adjustment.  The PRN Seroquel had been somewhat helpful.  There was questions as to whether the patient could benefit from a behavioral analyst and they were going to pursue that as well.      I saw the patient in November 2019. He was living in his parents house at that time. He was doing fairly well but had occasional outbursts. Oftentimes these were directed towards his father in frustration. They were starting with a behavioral analyst to begin working on those issues.     I saw the patient in September 2020.  He was doing relatively well at that time and tolerating them change in group homes.  We continued his medication regime.     The patient in March 2020.  He was doing fairly well at that time and was attending speech therapy which was reportedly helpful.  We elected to continue with his current treatment plan at that time and did not make any changes.     Saw the patient in June 2021.  He continued to make progress in therapies.  He was becoming stronger and was dressing himself.  He still had periods where he was upset and refused cares.  He was working with the behavioral analyst and that seem to be helping.  We did not make any medication changes at that visit.     The patient was seen in October 2021.  I also interviewed his mother and father at that time.  The patient was isolating a bit but was getting back on a more regular schedule.  We had considered an increase in the Pristiq.  He was tolerating the medication.  We continue with the current treatment plan at that time and did not make any changes.     The patient was seen in  early February 2022.  He was doing well at that time and they were trying to set some limits on screen time which seemed to be associated with some behavioral outbursts.  He was sleeping well and medically stable.  We continued with the treatment plan at that time.     The patient was seen in May 2022.  He was doing relatively well at that time but continued with occasional irritability and probable underlying depression.  We talked about possibly increasing the Pristiq but the family wanted to continue with the current treatment plan and we did not make changes.    The patient was seen on August 18, 2022 when he was doing well at that time.  His father felt that his cognition was actually improving and the Pristiq was helpful.  We increased the Pristiq to 125 mg a day as there was still room for improvement but behaviorally things were better.  No other new medical issues or concerns.       The patient was seen for follow-up in November 2022.  He was doing relatively well at that time and was tolerating the medication.  We did not make any medication changes.    The patient returned for a visit in May.  He was doing fairly well at that time with no significant change and he was tolerating the medication.  We elected to continue with the treatment plan.    The patient returned to this clinic in November 2023.  He is doing well and continues to follow by the medical team and neurologist.  There were no significant behavioral issues and no concerns.  He denies any medication changes.      HPI:  Patient presents today for the purposes of medication management.   The patient was unable participate much with the father provided history and stated that making slow progress forward.  He is now working with a therapist from speech as well as medical transplant  Very well.  He may take a break from station utilizing the usage of eyes.  He goes to his brother's work once a week and he enjoys that.  He continues to be followed  by his medical team.  Neurology and I did review the labs. No change in his medical status.  He is sleeping well and eating well.  No significant behavior problems.  No depression.  No psychosis.  Continue with current treatment plan.     We discussed some treatment options and have elected to continue with the current treatment plan.     Current Medications: Please see chart. Medications personally reviewed.        Review of Systems  A comprehensive review of systems was negative except for what is noted above     Mental Status Exam  Appearance:   No apparent distress, per dad.  Not restless or agitated.  No shortness of breath.  Behavior: No reports of any recent behavioral changes.  The patient is currently calm.  No agitation.  Not restless.  Speech: No change.  Not able participate today but vague and slow.  Mood/Affect: Not significantly depressed or anxious at this time.  No obvious frustration or irritability. No lability.  No irritability.  Mood seems stable.  Thought Content: No reports of any psychosis.  No evidence of any psychosis at this time.  Suicidal or Homicidal Thoughts: No reports of any suicidal or homicidal ideation or behavior.  Thought Process/Formulation: Impaired.  Somewhat slow and concrete.   Somewhat vague.  No evidence of any racing thoughts.  Associations: Continues impaired.  Fund of Knowledge:  Reports are that the patient continues with some impairment. No obvious change on interview.  Attention/Concentration: Able to attend to and track. Concentration impaired.  No reports of any significant recent change.  No racing thoughts.  The patient continues vague and concrete and slow.  Insight:  Impaired. No reports of any recent significant change.   Judgement:  Grossly impaired.  No obvious recent change  Memory: Continues impaired.  Does need prompts.  Does need structure.    Motor Status:  No recent change reported. No current tremor.  Orientation: Impaired.          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 They agree to call or return sooner with any questions or concerns.  Risks and benefits were discussed.  Continue with his behavioral analyst.     Continue with the support of the clinic, reassurance, and redirection. Staff monitoring and ongoing assessments per team plan. Current psychotropic medication appears to represent the minimum effective dosage and appears medically necessary. We will continue to monitor and reassess. This team will utilize appropriate emergency services if necessary. I will make myself available if concerns or problems arise.      Robin Parker MD

## 2024-04-18 NOTE — PATIENT INSTRUCTIONS
The patient is doing well and tolerating medications.  No medication changes at this time.  Return to clinic in 3 months for medication check.

## 2024-06-07 DIAGNOSIS — F06.30 MOOD DISORDER AS LATE EFFECT OF TRAUMATIC BRAIN INJURY (H): ICD-10-CM

## 2024-06-07 DIAGNOSIS — S06.9XAS MOOD DISORDER AS LATE EFFECT OF TRAUMATIC BRAIN INJURY (H): ICD-10-CM

## 2024-06-07 NOTE — TELEPHONE ENCOUNTER
Dr. Parker,    Please deny Rx refill request for desvenlafaxine (PRISTIQ) 100 MG 24 hr tablet if appropriate.   Last refill was on 04/18/24 with 30 tablets, 3 refills.       Thank you,  Capri Linares MA on 6/7/2024 at 2:34 PM

## 2024-06-08 RX ORDER — DESVENLAFAXINE 25 MG/1
TABLET, EXTENDED RELEASE ORAL
Qty: 30 TABLET | Refills: 0 | Status: SHIPPED | OUTPATIENT
Start: 2024-06-08 | End: 2024-07-09

## 2024-06-30 ENCOUNTER — HEALTH MAINTENANCE LETTER (OUTPATIENT)
Age: 39
End: 2024-06-30

## 2024-07-07 DIAGNOSIS — S06.9XAS MOOD DISORDER AS LATE EFFECT OF TRAUMATIC BRAIN INJURY (H): ICD-10-CM

## 2024-07-07 DIAGNOSIS — F06.30 MOOD DISORDER AS LATE EFFECT OF TRAUMATIC BRAIN INJURY (H): ICD-10-CM

## 2024-07-08 NOTE — TELEPHONE ENCOUNTER
Refill request for the following medication (s) listed below.    Pending Prescriptions:                       Disp   Refills    desvenlafaxine succinate (PRISTIQ) 25 MG *30 tab*0            Sig: TAKE 1 TABLET BY MOUTH ONE TIME DAILY WITH A 100           MG TABLET FOR A TOTAL  MG      Last office visit provider:  04/18/24  Next appointment scheduled: 7/25/24      Medication T'd for review and signature  Capri Linares MA on 7/8/2024 at 1:57 PM

## 2024-07-09 RX ORDER — DESVENLAFAXINE 25 MG/1
TABLET, EXTENDED RELEASE ORAL
Qty: 30 TABLET | Refills: 1 | Status: SHIPPED | OUTPATIENT
Start: 2024-07-09 | End: 2024-07-25

## 2024-07-25 ENCOUNTER — VIRTUAL VISIT (OUTPATIENT)
Dept: NEUROLOGY | Facility: CLINIC | Age: 39
End: 2024-07-25
Payer: MEDICARE

## 2024-07-25 DIAGNOSIS — F06.30 MOOD DISORDER AS LATE EFFECT OF TRAUMATIC BRAIN INJURY (H): ICD-10-CM

## 2024-07-25 DIAGNOSIS — S06.9XAS MOOD DISORDER AS LATE EFFECT OF TRAUMATIC BRAIN INJURY (H): ICD-10-CM

## 2024-07-25 PROCEDURE — G2211 COMPLEX E/M VISIT ADD ON: HCPCS | Mod: 95 | Performed by: PSYCHIATRY & NEUROLOGY

## 2024-07-25 PROCEDURE — 99214 OFFICE O/P EST MOD 30 MIN: CPT | Mod: 95 | Performed by: PSYCHIATRY & NEUROLOGY

## 2024-07-25 RX ORDER — DESVENLAFAXINE 25 MG/1
TABLET, EXTENDED RELEASE ORAL
Qty: 30 TABLET | Refills: 1 | Status: SHIPPED | OUTPATIENT
Start: 2024-07-25 | End: 2024-10-04

## 2024-07-25 RX ORDER — QUETIAPINE FUMARATE 25 MG/1
25 TABLET, FILM COATED ORAL 3 TIMES DAILY PRN
Qty: 90 TABLET | Refills: 3 | Status: SHIPPED | OUTPATIENT
Start: 2024-07-25

## 2024-07-25 RX ORDER — QUETIAPINE FUMARATE 50 MG/1
50 TABLET, EXTENDED RELEASE ORAL AT BEDTIME
Qty: 90 TABLET | Refills: 0 | Status: SHIPPED | OUTPATIENT
Start: 2024-07-25

## 2024-07-25 RX ORDER — ASCORBIC ACID 500 MG
TABLET ORAL
COMMUNITY

## 2024-07-25 RX ORDER — DESVENLAFAXINE 100 MG/1
100 TABLET, EXTENDED RELEASE ORAL EVERY MORNING
Qty: 30 TABLET | Refills: 3 | Status: SHIPPED | OUTPATIENT
Start: 2024-07-25

## 2024-07-25 ASSESSMENT — PAIN SCALES - GENERAL: PAINLEVEL: NO PAIN (0)

## 2024-07-25 NOTE — LETTER
7/25/2024      Robin Blum  [address] [facility] [address]      Dear Colleague,    Thank you for referring your patient, Robin Blum, to the [facility] NEUROLOGY CLINIC [facility]. Please see a copy of my visit note below.    Virtual Visit Details    Type of service:  Video Visit   Video Start Time: 1:21 PM  Video End Time: 1:40 PM    Originating Location (pt. Location): The patient's home  Distant Location (provider location): My home office  Platform used for Video Visit: Compario      Outpatient Follow up TBI Evaluation     Pertinent History: The patient was referred to this clinic for further assessment and treatment .The patient has a history of significant cognitive mood and behavioral difficulties related to a prior traumatic brain injury.  After my April 2016 visit he had been followed by the nurse practitioner but reestablish contact with my clinic in October 2018.     Patient last saw the nurse practitioner and June 2018.  The patient was a bit more sedated at that time following a recent change in his Seroquel to extended release due to some increased agitation.  At that visit low-dose daytime Seroquel was added.     Patient is followed by therapist Ivet Rizo and last saw the therapist September 2018.  He moved to a new group home in 2018 and seemed to like that.  He seemed to be doing fairly well at that time.     I saw the patient in October 2018.  The patient was doing relatively well at that time and I did not make any psychotropic medication changes.     I saw the patient in January 2019.  At that time he was communicating a bit more and using gestures.  He continued to be frustrated with occasional conflict with caregivers.  We did not make any medication changes at that time.     I saw the patient in August 2019.  I met with the patient's mother and father for 20 minutes prior to that appointment.  They had some concerns about the patient's presentation and his  cares.  They felt he was spending too much time on the iPad.  We did address this with the group home staff.  We made no medication changes at that time.  The patient moved back home with his mom and dad on October 30.  That went quite well for a week or so but then the patient began showing more behavioral issues.  Shortly before the November 2019 meeting I did increase the patient's PRN Seroquel in the context of been increased behavioral issues and refusals of multiple medications.     I saw the patient in November 2019.  The patient had been at his current group home for about and was having some behaviors possibly related to adjustment.  The PRN Seroquel had been somewhat helpful.  There was questions as to whether the patient could benefit from a behavioral analyst and they were going to pursue that as well.      I saw the patient in November 2019. He was living in his parents house at that time. He was doing fairly well but had occasional outbursts. Oftentimes these were directed towards his father in frustration. They were starting with a behavioral analyst to begin working on those issues.     I saw the patient in September 2020.  He was doing relatively well at that time and tolerating them change in group homes.  We continued his medication regime.     The patient in March 2020.  He was doing fairly well at that time and was attending speech therapy which was reportedly helpful.  We elected to continue with his current treatment plan at that time and did not make any changes.     Saw the patient in June 2021.  He continued to make progress in therapies.  He was becoming stronger and was dressing himself.  He still had periods where he was upset and refused cares.  He was working with the behavioral analyst and that seem to be helping.  We did not make any medication changes at that visit.     The patient was seen in October 2021.  I also interviewed his mother and father at that time.  The patient was  isolating a bit but was getting back on a more regular schedule.  We had considered an increase in the Pristiq.  He was tolerating the medication.  We continue with the current treatment plan at that time and did not make any changes.     The patient was seen in early February 2022.  He was doing well at that time and they were trying to set some limits on screen time which seemed to be associated with some behavioral outbursts.  He was sleeping well and medically stable.  We continued with the treatment plan at that time.     The patient was seen in May 2022.  He was doing relatively well at that time but continued with occasional irritability and probable underlying depression.  We talked about possibly increasing the Pristiq but the family wanted to continue with the current treatment plan and we did not make changes.    The patient was seen on August 18, 2022 when he was doing well at that time.  His father felt that his cognition was actually improving and the Pristiq was helpful.  We increased the Pristiq to 125 mg a day as there was still room for improvement but behaviorally things were better.  No other new medical issues or concerns.       The patient was seen for follow-up in November 2022.  He was doing relatively well at that time and was tolerating the medication.  We did not make any medication changes.    The patient returned for a visit in May.  He was doing fairly well at that time with no significant change and he was tolerating the medication.  We elected to continue with the treatment plan.    The patient returned to this clinic in November 2023.  He is doing well and continues to follow by the medical team and neurologist.  There were no significant behavioral issues and no concerns.  He denies any medication changes.    The patient was seen in April 2024.  He had low-level participation but no new issues or concerns.  I did review his records and we elected to continue with the treatment plan  and did not make any changes.      HPI:  Patient presents today for the purposes of medication management.   The patient presents with his mother and father.  All report the patient seems to be doing well.  His father Vitor requested no changes at this time.  He is having occasional outbursts but they have not been utilizing the as needed Seroquel.  They may start doing that.  No psychosis.  The patient has had no change in his cognition but he seems to be participating more with transfers and other activities.  No evidence of.  No evidence of any significant change in his mood.  No new abnormal movements or any new tremors.  I did review the records including laboratory data from [facility].  The patient seems to be tolerating the medication.     We discussed some treatment options and have elected to continue with the current treatment plan.     Current Medications: Please see chart. Medications personally reviewed.        Review of Systems  A comprehensive review of systems was negative except for what is noted above     Mental Status Exam  Appearance: No pain.  Able to maintain some eye contact.  Not restless.  Not short of breath.  Psychomotor depressed.  Behavior: No reports of any significant behavioral dyscontrol or agitation.  Able to participate in track.  No restlessness.  Speech: Mostly communicates by shaking and nodding his head.  There is a delay.  Extremely vague.  Mood/Affect: Flat and slow.  Not currently anxious or labile.  No dov.  Thought Content: No psychosis is apparent.  No recent reported psychosis.  Suicidal or Homicidal Thoughts:  None apparent or reported.   Thought Process/Formulation: Tracking and following adequately.  Somewhat slow.  Able to participate.  No racing thoughts.  Not loose.  Associations: Continues impaired.  No significant change.  Able to participate somewhat.  Somewhat slow.  Fund of Knowledge:  No reports of any significant recent  change.  Attention/Concentration: Grossly fair.  Needing prompts and structure.  No reports of any change.  Grossly able to follow some conversation.  Concentration appears fair.  Insight: No change.  Grossly intact.  No reports of any recent change.  Judgement: Fair.  No reports by staff of any significant recent change.  Memory: Appears adequate.  No reported recent change.  Slow.   Motor Status: No change in his baseline deficits.  Continues impaired  Orientation: Continues impaired.  No recent change..        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  I reminded them that they could utilize the as needed Seroquel.  He will continue to follow with his medical team and neurologist.  I did inform the patient that this clinic would be closing.  They may decide to seek an outside clinic and may stay within the [facility] system.  The patient receives his medical care through [facility].           Continue with the support of the clinic, reassurance, and redirection. Staff monitoring and ongoing assessments per team plan. Current psychotropic medication appears to represent the minimum effective dosage and appears medically necessary. We will continue to monitor and reassess. This team will utilize appropriate emergency services if necessary. I will make myself available if concerns or problems arise.      Robin Parker MD      Again, thank you for allowing me to participate in the care of your patient.        Sincerely,        Robin Parker MD

## 2024-07-25 NOTE — PROGRESS NOTES
Virtual Visit Details    Type of service:  Video Visit   Video Start Time: 1:21 PM  Video End Time: 1:40 PM    Originating Location (pt. Location): The patient's home  Distant Location (provider location): My home office  Platform used for Video Visit: [email or web address]      Outpatient Follow up TBI Evaluation     Pertinent History: The patient was referred to this clinic for further assessment and treatment .The patient has a history of significant cognitive mood and behavioral difficulties related to a prior traumatic brain injury.  After my April 2016 visit he had been followed by the nurse practitioner but reestablish contact with my clinic in October 2018.     Patient last saw the nurse practitioner and June 2018.  The patient was a bit more sedated at that time following a recent change in his Seroquel to extended release due to some increased agitation.  At that visit low-dose daytime Seroquel was added.     Patient is followed by therapist Ivet Rizo and last saw the therapist September 2018.  He moved to a new group home in 2018 and seemed to like that.  He seemed to be doing fairly well at that time.     I saw the patient in October 2018.  The patient was doing relatively well at that time and I did not make any psychotropic medication changes.     I saw the patient in January 2019.  At that time he was communicating a bit more and using gestures.  He continued to be frustrated with occasional conflict with caregivers.  We did not make any medication changes at that time.     I saw the patient in August 2019.  I met with the patient's mother and father for 20 minutes prior to that appointment.  They had some concerns about the patient's presentation and his cares.  They felt he was spending too much time on the iPad.  We did address this with the group home staff.  We made no medication changes at that time.  The patient moved back home with his mom and dad on October 30.  That went quite well for a week or so  but then the patient began showing more behavioral issues.  Shortly before the November 2019 meeting I did increase the patient's PRN Seroquel in the context of been increased behavioral issues and refusals of multiple medications.     I saw the patient in November 2019.  The patient had been at his current group home for about and was having some behaviors possibly related to adjustment.  The PRN Seroquel had been somewhat helpful.  There was questions as to whether the patient could benefit from a behavioral analyst and they were going to pursue that as well.      I saw the patient in November 2019. He was living in his parents house at that time. He was doing fairly well but had occasional outbursts. Oftentimes these were directed towards his father in frustration. They were starting with a behavioral analyst to begin working on those issues.     I saw the patient in September 2020.  He was doing relatively well at that time and tolerating them change in group homes.  We continued his medication regime.     The patient in March 2020.  He was doing fairly well at that time and was attending speech therapy which was reportedly helpful.  We elected to continue with his current treatment plan at that time and did not make any changes.     Saw the patient in June 2021.  He continued to make progress in therapies.  He was becoming stronger and was dressing himself.  He still had periods where he was upset and refused cares.  He was working with the behavioral analyst and that seem to be helping.  We did not make any medication changes at that visit.     The patient was seen in October 2021.  I also interviewed his mother and father at that time.  The patient was isolating a bit but was getting back on a more regular schedule.  We had considered an increase in the Pristiq.  He was tolerating the medication.  We continue with the current treatment plan at that time and did not make any changes.     The patient was seen  in early February 2022.  He was doing well at that time and they were trying to set some limits on screen time which seemed to be associated with some behavioral outbursts.  He was sleeping well and medically stable.  We continued with the treatment plan at that time.     The patient was seen in May 2022.  He was doing relatively well at that time but continued with occasional irritability and probable underlying depression.  We talked about possibly increasing the Pristiq but the family wanted to continue with the current treatment plan and we did not make changes.    The patient was seen on August 18, 2022 when he was doing well at that time.  His father felt that his cognition was actually improving and the Pristiq was helpful.  We increased the Pristiq to 125 mg a day as there was still room for improvement but behaviorally things were better.  No other new medical issues or concerns.       The patient was seen for follow-up in November 2022.  He was doing relatively well at that time and was tolerating the medication.  We did not make any medication changes.    The patient returned for a visit in May.  He was doing fairly well at that time with no significant change and he was tolerating the medication.  We elected to continue with the treatment plan.    The patient returned to this clinic in November 2023.  He is doing well and continues to follow by the medical team and neurologist.  There were no significant behavioral issues and no concerns.  He denies any medication changes.    The patient was seen in April 2024.  He had low-level participation but no new issues or concerns.  I did review his records and we elected to continue with the treatment plan and did not make any changes.      HPI:  Patient presents today for the purposes of medication management.   The patient presents with his mother and father.  All report the patient seems to be doing well.  His father Vitor requested no changes at this time.   He is having occasional outbursts but they have not been utilizing the as needed Seroquel.  They may start doing that.  No psychosis.  The patient has had no change in his cognition but he seems to be participating more with transfers and other activities.  No evidence of.  No evidence of any significant change in his mood.  No new abnormal movements or any new tremors.  I did review the records including laboratory data from [facility].  The patient seems to be tolerating the medication.     We discussed some treatment options and have elected to continue with the current treatment plan.     Current Medications: Please see chart. Medications personally reviewed.        Review of Systems  A comprehensive review of systems was negative except for what is noted above     Mental Status Exam  Appearance: No pain.  Able to maintain some eye contact.  Not restless.  Not short of breath.  Psychomotor depressed.  Behavior: No reports of any significant behavioral dyscontrol or agitation.  Able to participate in track.  No restlessness.  Speech: Mostly communicates by shaking and nodding his head.  There is a delay.  Extremely vague.  Mood/Affect: Flat and slow.  Not currently anxious or labile.  No dov.  Thought Content: No psychosis is apparent.  No recent reported psychosis.  Suicidal or Homicidal Thoughts:  None apparent or reported.   Thought Process/Formulation: Tracking and following adequately.  Somewhat slow.  Able to participate.  No racing thoughts.  Not loose.  Associations: Continues impaired.  No significant change.  Able to participate somewhat.  Somewhat slow.  Fund of Knowledge:  No reports of any significant recent change.  Attention/Concentration: Grossly fair.  Needing prompts and structure.  No reports of any change.  Grossly able to follow some conversation.  Concentration appears fair.  Insight: No change.  Grossly intact.  No reports of any recent change.  Judgement: Fair.  No reports by staff  of any significant recent change.  Memory: Appears adequate.  No reported recent change.  Slow.   Motor Status: No change in his baseline deficits.  Continues impaired  Orientation: Continues impaired.  No recent change..        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  I reminded them that they could utilize the as needed Seroquel.  He will continue to follow with his medical team and neurologist.  I did inform the patient that this clinic would be closing.  They may decide to seek an outside clinic and may stay within the [facility] system.  The patient receives his medical care through [facility].           Continue with the support of the clinic, reassurance, and redirection. Staff monitoring and ongoing assessments per team plan. Current psychotropic medication appears to represent the minimum effective dosage and appears medically necessary. We will continue to monitor and reassess. This team will utilize appropriate emergency services if necessary. I will make myself available if concerns or problems arise.      Robin Parker MD

## 2024-07-25 NOTE — PATIENT INSTRUCTIONS
I will continue with the current medications.  I reminded them that they could utilize the as needed Seroquel.  He will continue to follow with his medical team and neurologist.  I did inform the patient that this clinic would be closing.  They may decide to seek an outside clinic and may stay within the [facility] system.  The patient receives his medical care through [facility].

## 2024-07-25 NOTE — NURSING NOTE
Current patient location: [address] [place] [id number]    Is the patient currently in the state of [place]? YES    Visit mode:VIDEO    If the visit is dropped, the patient can be reconnected by: VIDEO VISIT: Text to cell phone:   Telephone Information:   Mobile [phone]       Will anyone else be joining the visit? NO  (If patient encounters technical issues they should call [phone]    How would you like to obtain your AVS? MyChart    Are changes needed to the allergy or medication list? No    Are refills needed on medications prescribed by this physician? NO    Reason for visit: GIOVANA MUSTAFAF

## 2024-08-23 ENCOUNTER — TELEPHONE (OUTPATIENT)
Dept: NEUROLOGY | Facility: CLINIC | Age: 39
End: 2024-08-23
Payer: MEDICARE

## 2024-08-23 DIAGNOSIS — F06.30 MOOD DISORDER AS LATE EFFECT OF TRAUMATIC BRAIN INJURY (H): Primary | ICD-10-CM

## 2024-08-23 DIAGNOSIS — S06.9XAS MOOD DISORDER AS LATE EFFECT OF TRAUMATIC BRAIN INJURY (H): Primary | ICD-10-CM

## 2024-08-23 NOTE — TELEPHONE ENCOUNTER
[facility] Call Center    Phone Message    May a detailed message be left on voicemail: yes     Reason for Call: Symptoms or Concerns     If patient has red-flag symptoms, warm transfer to triage line    Current symptom or concern: Waking    Symptoms have been present for:  1 week(s)    Has patient previously been seen for this? No    Are there any new or worsening symptoms? Yes: Pt mother Pat calling to report that for about a week, pt has been waking up at around 4 in the morning and staying awake.    Please advise Pat at [phone]    Action Taken: Message routed to:  Other: [facility] Neurology    Travel Screening: Not Applicable     Date of Service:

## 2024-08-23 NOTE — TELEPHONE ENCOUNTER
"Called and spoke with patient's mother (chart lists her as legal guardian though paperwork doesn't appear in chart).    Kelsey reports patient has had a few \"waking\" nights in a row this week, where patient is concerned about a keepsake, or the state of the [place] (just some agitations causing patient to awaken and stay awake).    There haven't been any recent medication changes, and no report of new illness or stress.    MD to please advise on any interventions.    Jonah Groves, RN, BSN  [facility] Neurology    "

## 2024-08-26 RX ORDER — HYDROXYZINE PAMOATE 25 MG/1
25 CAPSULE ORAL
Qty: 60 CAPSULE | Refills: 1 | Status: SHIPPED | OUTPATIENT
Start: 2024-08-26

## 2024-08-26 NOTE — TELEPHONE ENCOUNTER
Called and discussed provider recommendations with patient's mother, conveying the medication, hydroxyzine 25mg, was sent to pharmacy to be used as needed.    Education on medication (use and side effects) provided.    They are agreeable with plan and will call back as needed.    Jonah Groves RN, BSN  [facility] Neurology

## 2024-10-04 DIAGNOSIS — S06.9XAS MOOD DISORDER AS LATE EFFECT OF TRAUMATIC BRAIN INJURY (H): ICD-10-CM

## 2024-10-04 DIAGNOSIS — F06.30 MOOD DISORDER AS LATE EFFECT OF TRAUMATIC BRAIN INJURY (H): ICD-10-CM

## 2024-10-04 RX ORDER — DESVENLAFAXINE 25 MG/1
TABLET, EXTENDED RELEASE ORAL
Qty: 30 TABLET | Refills: 2 | Status: SHIPPED | OUTPATIENT
Start: 2024-10-04

## 2024-10-04 NOTE — TELEPHONE ENCOUNTER
Refill request for the following medication (s) listed below.    Pending Prescriptions:                       Disp   Refills    desvenlafaxine succinate (PRISTIQ) 25 MG *30 tab*0            Sig: TAKE 1 TABLET BY MOUTH ONE TIME DAILY WITH A 100           MG TABLET FOR A TOTAL  MG      Last office visit provider:  7/25/24  Next appointment scheduled: Pt informed he will need to set up an appt outside this clinic      Medication T'd for review and signature  Manjinder JONAS ATC on 10/4/2024 at 11:50 AM

## 2024-12-01 DIAGNOSIS — S06.9XAS MOOD DISORDER AS LATE EFFECT OF TRAUMATIC BRAIN INJURY (H): ICD-10-CM

## 2024-12-01 DIAGNOSIS — F06.30 MOOD DISORDER AS LATE EFFECT OF TRAUMATIC BRAIN INJURY (H): ICD-10-CM

## 2024-12-03 RX ORDER — DESVENLAFAXINE 100 MG/1
100 TABLET, EXTENDED RELEASE ORAL EVERY MORNING
Qty: 30 TABLET | Refills: 0 | OUTPATIENT
Start: 2024-12-03

## 2024-12-03 NOTE — TELEPHONE ENCOUNTER
Provider no longer with [facility] system as of 10/15/2024. Pt previously given letter disclosing that refills would not be provided after 10/15. Letter also included direction for future follow up outside of the [facility] for future care and refills.     Refused refill request.     Enoch BAKER RN, BSN  [facility] Neurology

## 2024-12-03 NOTE — TELEPHONE ENCOUNTER
Please deny Rx. Pt was informed by provider that he would be leaving the clinic.    Manjinder Irwin LAT ATC on 12/3/2024 at 8:56 AM

## 2024-12-05 DIAGNOSIS — S06.9XAS MOOD DISORDER AS LATE EFFECT OF TRAUMATIC BRAIN INJURY (H): ICD-10-CM

## 2024-12-05 DIAGNOSIS — F06.30 MOOD DISORDER AS LATE EFFECT OF TRAUMATIC BRAIN INJURY (H): ICD-10-CM

## 2024-12-09 ENCOUNTER — TELEPHONE (OUTPATIENT)
Dept: NEUROLOGY | Facility: CLINIC | Age: 39
End: 2024-12-09
Payer: MEDICARE

## 2024-12-09 NOTE — TELEPHONE ENCOUNTER
[facility] Call Center    Phone Message    May a detailed message be left on voicemail: yes     Reason for Call: Medication Question or concern regarding medication   Prescription Clarification  Name of Medication: desvenlafaxine (PRISTIQ) 100 MG 24 hr tablet   Prescribing Provider: Keith   Pharmacy: [id number] - [place], [place] - [address]    What on the order needs clarification? Patient's mother Kelsey called to request for one of our providers to fill patient's medication. Kelsey noted that the medication requests were denied, but she is hoping that an exception will be made as she left a couple [facility] last week and never heard back from him for the refills. Kelsey is wondering if one of our providers would fill the medication for patient as he is needing a refill.       Action Taken: Message routed to:  Other: Neurology    Travel Screening: Not Applicable     Date of Service:

## 2024-12-09 NOTE — TELEPHONE ENCOUNTER
[facility] Call Center    Phone Message    May a detailed message be left on voicemail: yes     Reason for Call: Patient's mother called to say they got a hold of Dr. Parker and do not need assistance with medication    Action Taken: [facility]    Travel Screening: Not Applicable     Date of Service:

## 2024-12-10 RX ORDER — DESVENLAFAXINE 100 MG/1
100 TABLET, EXTENDED RELEASE ORAL EVERY MORNING
Qty: 30 TABLET | Refills: 0 | OUTPATIENT
Start: 2024-12-10

## 2024-12-10 NOTE — TELEPHONE ENCOUNTER
Provider no longer with [facility] as of 10/15/2024. Pt previously given letter disclosing that refills would not be provided after 10/15. Letter also included direction for future follow up outside of the [facility] for future care and refills.     Refills denied.     Enoch BAKER RN, BSN  [facility]

## 2024-12-10 NOTE — TELEPHONE ENCOUNTER
Please deny Rx refill request for desvenlafaxine (PRISTIQ) 100 MG 24 hr tablet  if appropriate.        Thank you.   Manjinder JONAS ATC on 12/10/2024 at 8:33 AM

## 2025-07-13 ENCOUNTER — HEALTH MAINTENANCE LETTER (OUTPATIENT)
Age: 40
End: 2025-07-13